# Patient Record
Sex: FEMALE | Race: WHITE | NOT HISPANIC OR LATINO | Employment: OTHER | URBAN - METROPOLITAN AREA
[De-identification: names, ages, dates, MRNs, and addresses within clinical notes are randomized per-mention and may not be internally consistent; named-entity substitution may affect disease eponyms.]

---

## 2017-01-06 ENCOUNTER — COMMUNICATION - HEALTHEAST (OUTPATIENT)
Dept: INTERNAL MEDICINE | Facility: CLINIC | Age: 82
End: 2017-01-06

## 2017-01-06 DIAGNOSIS — K21.9 ESOPHAGEAL REFLUX: ICD-10-CM

## 2017-01-06 DIAGNOSIS — F41.1 ANXIETY STATE: ICD-10-CM

## 2017-01-06 DIAGNOSIS — D62 ACUTE POSTHEMORRHAGIC ANEMIA: ICD-10-CM

## 2017-01-06 DIAGNOSIS — E11.9 DIABETES MELLITUS, TYPE 2 (H): ICD-10-CM

## 2017-03-27 ENCOUNTER — RECORDS - HEALTHEAST (OUTPATIENT)
Dept: ADMINISTRATIVE | Facility: OTHER | Age: 82
End: 2017-03-27

## 2017-04-06 ENCOUNTER — COMMUNICATION - HEALTHEAST (OUTPATIENT)
Dept: INTERNAL MEDICINE | Facility: CLINIC | Age: 82
End: 2017-04-06

## 2017-05-11 ENCOUNTER — OFFICE VISIT - HEALTHEAST (OUTPATIENT)
Dept: INTERNAL MEDICINE | Facility: CLINIC | Age: 82
End: 2017-05-11

## 2017-05-11 DIAGNOSIS — E11.9 DIABETES MELLITUS, TYPE 2 (H): ICD-10-CM

## 2017-05-11 DIAGNOSIS — I10 ESSENTIAL HYPERTENSION: ICD-10-CM

## 2017-05-11 DIAGNOSIS — E11.9 TYPE 2 DIABETES MELLITUS WITHOUT COMPLICATION (H): ICD-10-CM

## 2017-05-11 DIAGNOSIS — M54.50 LUMBAGO: ICD-10-CM

## 2017-05-11 DIAGNOSIS — E78.2 MIXED HYPERLIPIDEMIA: ICD-10-CM

## 2017-05-11 LAB
CHOLEST SERPL-MCNC: 183 MG/DL
FASTING STATUS PATIENT QL REPORTED: YES
HBA1C MFR BLD: 7.4 % (ref 3.5–6)
HDLC SERPL-MCNC: 53 MG/DL
LDLC SERPL CALC-MCNC: 106 MG/DL
TRIGL SERPL-MCNC: 119 MG/DL

## 2017-05-14 ENCOUNTER — AMBULATORY - HEALTHEAST (OUTPATIENT)
Dept: INTERNAL MEDICINE | Facility: CLINIC | Age: 82
End: 2017-05-14

## 2017-05-14 DIAGNOSIS — E78.5 HYPERLIPIDEMIA: ICD-10-CM

## 2017-05-19 ENCOUNTER — OFFICE VISIT - HEALTHEAST (OUTPATIENT)
Dept: PHYSICAL THERAPY | Facility: CLINIC | Age: 82
End: 2017-05-19

## 2017-05-19 DIAGNOSIS — C50.911 MALIGNANT NEOPLASM OF RIGHT BREAST (H): ICD-10-CM

## 2017-05-19 DIAGNOSIS — E78.2 MIXED HYPERLIPIDEMIA: ICD-10-CM

## 2017-05-19 DIAGNOSIS — I10 ESSENTIAL HYPERTENSION: ICD-10-CM

## 2017-05-19 DIAGNOSIS — M54.50 RIGHT-SIDED LOW BACK PAIN WITHOUT SCIATICA, UNSPECIFIED CHRONICITY: ICD-10-CM

## 2017-05-19 DIAGNOSIS — E11.9 TYPE 2 DIABETES MELLITUS (H): ICD-10-CM

## 2017-05-19 DIAGNOSIS — Z98.1 HISTORY OF LUMBAR FUSION: ICD-10-CM

## 2017-05-19 DIAGNOSIS — M53.3 PAIN OF RIGHT SACROILIAC JOINT: ICD-10-CM

## 2017-05-19 DIAGNOSIS — M81.0 OSTEOPOROSIS: ICD-10-CM

## 2017-05-25 ENCOUNTER — OFFICE VISIT - HEALTHEAST (OUTPATIENT)
Dept: PHYSICAL THERAPY | Facility: CLINIC | Age: 82
End: 2017-05-25

## 2017-05-25 DIAGNOSIS — M81.0 OSTEOPOROSIS: ICD-10-CM

## 2017-05-25 DIAGNOSIS — E78.2 MIXED HYPERLIPIDEMIA: ICD-10-CM

## 2017-05-25 DIAGNOSIS — I10 ESSENTIAL HYPERTENSION: ICD-10-CM

## 2017-05-25 DIAGNOSIS — M54.50 RIGHT-SIDED LOW BACK PAIN WITHOUT SCIATICA, UNSPECIFIED CHRONICITY: ICD-10-CM

## 2017-05-25 DIAGNOSIS — M53.3 PAIN OF RIGHT SACROILIAC JOINT: ICD-10-CM

## 2017-05-25 DIAGNOSIS — Z98.1 HISTORY OF LUMBAR FUSION: ICD-10-CM

## 2017-05-25 DIAGNOSIS — C50.911 MALIGNANT NEOPLASM OF RIGHT BREAST (H): ICD-10-CM

## 2017-05-25 DIAGNOSIS — E11.9 TYPE 2 DIABETES MELLITUS (H): ICD-10-CM

## 2017-05-30 ENCOUNTER — OFFICE VISIT - HEALTHEAST (OUTPATIENT)
Dept: PHYSICAL THERAPY | Facility: CLINIC | Age: 82
End: 2017-05-30

## 2017-05-30 ENCOUNTER — COMMUNICATION - HEALTHEAST (OUTPATIENT)
Dept: FAMILY MEDICINE | Facility: CLINIC | Age: 82
End: 2017-05-30

## 2017-05-30 DIAGNOSIS — I10 ESSENTIAL HYPERTENSION: ICD-10-CM

## 2017-05-30 DIAGNOSIS — M53.3 PAIN OF RIGHT SACROILIAC JOINT: ICD-10-CM

## 2017-05-30 DIAGNOSIS — E78.2 MIXED HYPERLIPIDEMIA: ICD-10-CM

## 2017-05-30 DIAGNOSIS — E11.9 TYPE 2 DIABETES MELLITUS (H): ICD-10-CM

## 2017-05-30 DIAGNOSIS — M54.50 RIGHT-SIDED LOW BACK PAIN WITHOUT SCIATICA, UNSPECIFIED CHRONICITY: ICD-10-CM

## 2017-05-30 DIAGNOSIS — Z98.1 HISTORY OF LUMBAR FUSION: ICD-10-CM

## 2017-05-30 DIAGNOSIS — E78.5 HYPERLIPIDEMIA: ICD-10-CM

## 2017-05-30 DIAGNOSIS — M81.0 OSTEOPOROSIS: ICD-10-CM

## 2017-05-30 DIAGNOSIS — C50.911 MALIGNANT NEOPLASM OF RIGHT BREAST (H): ICD-10-CM

## 2017-06-02 ENCOUNTER — OFFICE VISIT - HEALTHEAST (OUTPATIENT)
Dept: PHYSICAL THERAPY | Facility: CLINIC | Age: 82
End: 2017-06-02

## 2017-06-02 DIAGNOSIS — E11.9 TYPE 2 DIABETES MELLITUS (H): ICD-10-CM

## 2017-06-02 DIAGNOSIS — I10 ESSENTIAL HYPERTENSION: ICD-10-CM

## 2017-06-02 DIAGNOSIS — Z98.1 HISTORY OF LUMBAR FUSION: ICD-10-CM

## 2017-06-02 DIAGNOSIS — C50.911 MALIGNANT NEOPLASM OF RIGHT BREAST (H): ICD-10-CM

## 2017-06-02 DIAGNOSIS — M81.0 OSTEOPOROSIS: ICD-10-CM

## 2017-06-02 DIAGNOSIS — M53.3 PAIN OF RIGHT SACROILIAC JOINT: ICD-10-CM

## 2017-06-02 DIAGNOSIS — M54.50 RIGHT-SIDED LOW BACK PAIN WITHOUT SCIATICA, UNSPECIFIED CHRONICITY: ICD-10-CM

## 2017-06-02 DIAGNOSIS — E78.2 MIXED HYPERLIPIDEMIA: ICD-10-CM

## 2017-06-06 ENCOUNTER — OFFICE VISIT - HEALTHEAST (OUTPATIENT)
Dept: PHYSICAL THERAPY | Facility: CLINIC | Age: 82
End: 2017-06-06

## 2017-06-06 DIAGNOSIS — E11.9 TYPE 2 DIABETES MELLITUS (H): ICD-10-CM

## 2017-06-06 DIAGNOSIS — M54.50 RIGHT-SIDED LOW BACK PAIN WITHOUT SCIATICA, UNSPECIFIED CHRONICITY: ICD-10-CM

## 2017-06-06 DIAGNOSIS — M53.3 PAIN OF RIGHT SACROILIAC JOINT: ICD-10-CM

## 2017-06-06 DIAGNOSIS — Z98.1 HISTORY OF LUMBAR FUSION: ICD-10-CM

## 2017-06-06 DIAGNOSIS — M81.0 OSTEOPOROSIS: ICD-10-CM

## 2017-06-06 DIAGNOSIS — Z98.1 STATUS POST LUMBAR SPINAL FUSION: ICD-10-CM

## 2017-06-06 DIAGNOSIS — C50.911 MALIGNANT NEOPLASM OF RIGHT BREAST (H): ICD-10-CM

## 2017-06-06 DIAGNOSIS — E78.2 MIXED HYPERLIPIDEMIA: ICD-10-CM

## 2017-06-06 DIAGNOSIS — I10 ESSENTIAL HYPERTENSION: ICD-10-CM

## 2017-06-09 ENCOUNTER — OFFICE VISIT - HEALTHEAST (OUTPATIENT)
Dept: PHYSICAL THERAPY | Facility: CLINIC | Age: 82
End: 2017-06-09

## 2017-06-09 DIAGNOSIS — M53.3 PAIN OF RIGHT SACROILIAC JOINT: ICD-10-CM

## 2017-06-09 DIAGNOSIS — E78.2 MIXED HYPERLIPIDEMIA: ICD-10-CM

## 2017-06-09 DIAGNOSIS — M81.0 OSTEOPOROSIS: ICD-10-CM

## 2017-06-09 DIAGNOSIS — Z98.1 HISTORY OF LUMBAR FUSION: ICD-10-CM

## 2017-06-09 DIAGNOSIS — Z98.1 STATUS POST LUMBAR SPINAL FUSION: ICD-10-CM

## 2017-06-09 DIAGNOSIS — M54.50 RIGHT-SIDED LOW BACK PAIN WITHOUT SCIATICA, UNSPECIFIED CHRONICITY: ICD-10-CM

## 2017-06-09 DIAGNOSIS — C50.911 MALIGNANT NEOPLASM OF RIGHT BREAST (H): ICD-10-CM

## 2017-06-09 DIAGNOSIS — E11.9 TYPE 2 DIABETES MELLITUS (H): ICD-10-CM

## 2017-06-09 DIAGNOSIS — I10 ESSENTIAL HYPERTENSION: ICD-10-CM

## 2017-06-13 ENCOUNTER — OFFICE VISIT - HEALTHEAST (OUTPATIENT)
Dept: PHYSICAL THERAPY | Facility: CLINIC | Age: 82
End: 2017-06-13

## 2017-06-13 DIAGNOSIS — M53.3 PAIN OF RIGHT SACROILIAC JOINT: ICD-10-CM

## 2017-06-13 DIAGNOSIS — M54.50 RIGHT-SIDED LOW BACK PAIN WITHOUT SCIATICA, UNSPECIFIED CHRONICITY: ICD-10-CM

## 2017-06-13 DIAGNOSIS — C50.911 MALIGNANT NEOPLASM OF RIGHT BREAST (H): ICD-10-CM

## 2017-06-13 DIAGNOSIS — E11.9 TYPE 2 DIABETES MELLITUS (H): ICD-10-CM

## 2017-06-13 DIAGNOSIS — Z98.1 STATUS POST LUMBAR SPINAL FUSION: ICD-10-CM

## 2017-06-13 DIAGNOSIS — M81.0 OSTEOPOROSIS: ICD-10-CM

## 2017-06-13 DIAGNOSIS — Z98.1 HISTORY OF LUMBAR FUSION: ICD-10-CM

## 2017-06-13 DIAGNOSIS — E78.2 MIXED HYPERLIPIDEMIA: ICD-10-CM

## 2017-06-13 DIAGNOSIS — I10 ESSENTIAL HYPERTENSION: ICD-10-CM

## 2017-06-16 ENCOUNTER — OFFICE VISIT - HEALTHEAST (OUTPATIENT)
Dept: PHYSICAL THERAPY | Facility: CLINIC | Age: 82
End: 2017-06-16

## 2017-06-16 DIAGNOSIS — I10 ESSENTIAL HYPERTENSION: ICD-10-CM

## 2017-06-16 DIAGNOSIS — M53.3 PAIN OF RIGHT SACROILIAC JOINT: ICD-10-CM

## 2017-06-16 DIAGNOSIS — M54.50 RIGHT-SIDED LOW BACK PAIN WITHOUT SCIATICA, UNSPECIFIED CHRONICITY: ICD-10-CM

## 2017-06-16 DIAGNOSIS — Z98.1 HISTORY OF LUMBAR FUSION: ICD-10-CM

## 2017-06-16 DIAGNOSIS — E78.2 MIXED HYPERLIPIDEMIA: ICD-10-CM

## 2017-06-16 DIAGNOSIS — E11.9 TYPE 2 DIABETES MELLITUS (H): ICD-10-CM

## 2017-06-16 DIAGNOSIS — M81.0 OSTEOPOROSIS: ICD-10-CM

## 2017-06-16 DIAGNOSIS — C50.911 MALIGNANT NEOPLASM OF RIGHT BREAST (H): ICD-10-CM

## 2017-06-20 ENCOUNTER — OFFICE VISIT - HEALTHEAST (OUTPATIENT)
Dept: PHYSICAL THERAPY | Facility: CLINIC | Age: 82
End: 2017-06-20

## 2017-06-20 DIAGNOSIS — I10 ESSENTIAL HYPERTENSION: ICD-10-CM

## 2017-06-20 DIAGNOSIS — M53.3 PAIN OF RIGHT SACROILIAC JOINT: ICD-10-CM

## 2017-06-20 DIAGNOSIS — E11.9 TYPE 2 DIABETES MELLITUS (H): ICD-10-CM

## 2017-06-20 DIAGNOSIS — E78.2 MIXED HYPERLIPIDEMIA: ICD-10-CM

## 2017-06-20 DIAGNOSIS — M54.50 RIGHT-SIDED LOW BACK PAIN WITHOUT SCIATICA, UNSPECIFIED CHRONICITY: ICD-10-CM

## 2017-06-20 DIAGNOSIS — Z98.1 HISTORY OF LUMBAR FUSION: ICD-10-CM

## 2017-06-20 DIAGNOSIS — M81.0 OSTEOPOROSIS: ICD-10-CM

## 2017-06-20 DIAGNOSIS — C50.911 MALIGNANT NEOPLASM OF RIGHT BREAST (H): ICD-10-CM

## 2017-06-23 ENCOUNTER — OFFICE VISIT - HEALTHEAST (OUTPATIENT)
Dept: PHYSICAL THERAPY | Facility: CLINIC | Age: 82
End: 2017-06-23

## 2017-06-23 DIAGNOSIS — C50.911 MALIGNANT NEOPLASM OF RIGHT BREAST (H): ICD-10-CM

## 2017-06-23 DIAGNOSIS — E78.2 MIXED HYPERLIPIDEMIA: ICD-10-CM

## 2017-06-23 DIAGNOSIS — Z98.1 HISTORY OF LUMBAR FUSION: ICD-10-CM

## 2017-06-23 DIAGNOSIS — E11.9 TYPE 2 DIABETES MELLITUS (H): ICD-10-CM

## 2017-06-23 DIAGNOSIS — M53.3 PAIN OF RIGHT SACROILIAC JOINT: ICD-10-CM

## 2017-06-23 DIAGNOSIS — M54.50 RIGHT-SIDED LOW BACK PAIN WITHOUT SCIATICA, UNSPECIFIED CHRONICITY: ICD-10-CM

## 2017-06-23 DIAGNOSIS — I10 ESSENTIAL HYPERTENSION: ICD-10-CM

## 2017-06-23 DIAGNOSIS — M81.0 OSTEOPOROSIS: ICD-10-CM

## 2017-06-27 ENCOUNTER — OFFICE VISIT - HEALTHEAST (OUTPATIENT)
Dept: PHYSICAL THERAPY | Facility: CLINIC | Age: 82
End: 2017-06-27

## 2017-06-27 DIAGNOSIS — M54.50 RIGHT-SIDED LOW BACK PAIN WITHOUT SCIATICA, UNSPECIFIED CHRONICITY: ICD-10-CM

## 2017-06-27 DIAGNOSIS — E78.2 MIXED HYPERLIPIDEMIA: ICD-10-CM

## 2017-06-27 DIAGNOSIS — Z98.1 HISTORY OF LUMBAR FUSION: ICD-10-CM

## 2017-06-27 DIAGNOSIS — E11.9 TYPE 2 DIABETES MELLITUS (H): ICD-10-CM

## 2017-06-27 DIAGNOSIS — M81.0 OSTEOPOROSIS: ICD-10-CM

## 2017-06-27 DIAGNOSIS — C50.911 MALIGNANT NEOPLASM OF RIGHT BREAST (H): ICD-10-CM

## 2017-06-27 DIAGNOSIS — I10 ESSENTIAL HYPERTENSION: ICD-10-CM

## 2017-06-27 DIAGNOSIS — M53.3 PAIN OF RIGHT SACROILIAC JOINT: ICD-10-CM

## 2017-06-28 ENCOUNTER — COMMUNICATION - HEALTHEAST (OUTPATIENT)
Dept: INTERNAL MEDICINE | Facility: CLINIC | Age: 82
End: 2017-06-28

## 2017-06-30 ENCOUNTER — OFFICE VISIT - HEALTHEAST (OUTPATIENT)
Dept: PHYSICAL THERAPY | Facility: CLINIC | Age: 82
End: 2017-06-30

## 2017-06-30 DIAGNOSIS — Z98.1 HISTORY OF LUMBAR FUSION: ICD-10-CM

## 2017-06-30 DIAGNOSIS — M81.0 OSTEOPOROSIS: ICD-10-CM

## 2017-06-30 DIAGNOSIS — E78.2 MIXED HYPERLIPIDEMIA: ICD-10-CM

## 2017-06-30 DIAGNOSIS — M53.3 PAIN OF RIGHT SACROILIAC JOINT: ICD-10-CM

## 2017-06-30 DIAGNOSIS — C50.911 MALIGNANT NEOPLASM OF RIGHT BREAST (H): ICD-10-CM

## 2017-06-30 DIAGNOSIS — E11.9 TYPE 2 DIABETES MELLITUS (H): ICD-10-CM

## 2017-06-30 DIAGNOSIS — I10 ESSENTIAL HYPERTENSION: ICD-10-CM

## 2017-06-30 DIAGNOSIS — M54.50 RIGHT-SIDED LOW BACK PAIN WITHOUT SCIATICA, UNSPECIFIED CHRONICITY: ICD-10-CM

## 2017-07-03 ENCOUNTER — RECORDS - HEALTHEAST (OUTPATIENT)
Dept: ADMINISTRATIVE | Facility: OTHER | Age: 82
End: 2017-07-03

## 2017-07-11 ENCOUNTER — RECORDS - HEALTHEAST (OUTPATIENT)
Dept: ADMINISTRATIVE | Facility: OTHER | Age: 82
End: 2017-07-11

## 2017-07-21 ENCOUNTER — OFFICE VISIT - HEALTHEAST (OUTPATIENT)
Dept: PHYSICAL THERAPY | Facility: REHABILITATION | Age: 82
End: 2017-07-21

## 2017-07-21 DIAGNOSIS — M53.3 PAIN OF RIGHT SACROILIAC JOINT: ICD-10-CM

## 2017-07-21 DIAGNOSIS — E11.9 TYPE 2 DIABETES MELLITUS (H): ICD-10-CM

## 2017-07-21 DIAGNOSIS — E78.2 MIXED HYPERLIPIDEMIA: ICD-10-CM

## 2017-07-21 DIAGNOSIS — I10 ESSENTIAL HYPERTENSION: ICD-10-CM

## 2017-07-21 DIAGNOSIS — Z98.1 HISTORY OF LUMBAR FUSION: ICD-10-CM

## 2017-07-21 DIAGNOSIS — M54.50 RIGHT-SIDED LOW BACK PAIN WITHOUT SCIATICA, UNSPECIFIED CHRONICITY: ICD-10-CM

## 2017-07-21 DIAGNOSIS — C50.911 MALIGNANT NEOPLASM OF RIGHT BREAST (H): ICD-10-CM

## 2017-07-21 DIAGNOSIS — M81.0 OSTEOPOROSIS: ICD-10-CM

## 2017-07-24 ENCOUNTER — COMMUNICATION - HEALTHEAST (OUTPATIENT)
Dept: INTERNAL MEDICINE | Facility: CLINIC | Age: 82
End: 2017-07-24

## 2017-07-24 DIAGNOSIS — M81.0 OSTEOPOROSIS: ICD-10-CM

## 2017-08-09 ENCOUNTER — COMMUNICATION - HEALTHEAST (OUTPATIENT)
Dept: FAMILY MEDICINE | Facility: CLINIC | Age: 82
End: 2017-08-09

## 2017-08-09 DIAGNOSIS — I10 ESSENTIAL HYPERTENSION, MALIGNANT: ICD-10-CM

## 2017-08-22 ENCOUNTER — COMMUNICATION - HEALTHEAST (OUTPATIENT)
Dept: INTERNAL MEDICINE | Facility: CLINIC | Age: 82
End: 2017-08-22

## 2017-08-22 DIAGNOSIS — E11.9 DIABETES MELLITUS, TYPE 2 (H): ICD-10-CM

## 2017-09-16 ENCOUNTER — COMMUNICATION - HEALTHEAST (OUTPATIENT)
Dept: INTERNAL MEDICINE | Facility: CLINIC | Age: 82
End: 2017-09-16

## 2017-09-16 DIAGNOSIS — K21.9 ESOPHAGEAL REFLUX: ICD-10-CM

## 2017-09-16 DIAGNOSIS — F41.1 ANXIETY STATE: ICD-10-CM

## 2017-10-03 ENCOUNTER — OFFICE VISIT - HEALTHEAST (OUTPATIENT)
Dept: INTERNAL MEDICINE | Facility: CLINIC | Age: 82
End: 2017-10-03

## 2017-10-03 DIAGNOSIS — Z12.31 SCREENING MAMMOGRAM, ENCOUNTER FOR: ICD-10-CM

## 2017-10-03 DIAGNOSIS — E78.2 MIXED HYPERLIPIDEMIA: ICD-10-CM

## 2017-10-03 DIAGNOSIS — M81.0 OSTEOPOROSIS: ICD-10-CM

## 2017-10-03 DIAGNOSIS — E11.9 DIABETES MELLITUS, TYPE 2 (H): ICD-10-CM

## 2017-10-03 DIAGNOSIS — Z00.00 HEALTH CARE MAINTENANCE: ICD-10-CM

## 2017-10-03 DIAGNOSIS — M79.672 MECHANICAL PAIN OF LEFT FOOT: ICD-10-CM

## 2017-10-03 DIAGNOSIS — I10 ESSENTIAL HYPERTENSION: ICD-10-CM

## 2017-10-03 DIAGNOSIS — E11.9 TYPE 2 DIABETES MELLITUS (H): ICD-10-CM

## 2017-10-03 LAB
CHOLEST SERPL-MCNC: 189 MG/DL
FASTING STATUS PATIENT QL REPORTED: NORMAL
HBA1C MFR BLD: 7.8 % (ref 3.5–6)
HDLC SERPL-MCNC: 59 MG/DL
LDLC SERPL CALC-MCNC: 105 MG/DL
TRIGL SERPL-MCNC: 127 MG/DL

## 2017-10-03 ASSESSMENT — MIFFLIN-ST. JEOR: SCORE: 997.93

## 2017-10-10 ENCOUNTER — AMBULATORY - HEALTHEAST (OUTPATIENT)
Dept: INTERNAL MEDICINE | Facility: CLINIC | Age: 82
End: 2017-10-10

## 2017-10-10 DIAGNOSIS — E78.5 HYPERLIPIDEMIA: ICD-10-CM

## 2017-10-17 ENCOUNTER — OFFICE VISIT - HEALTHEAST (OUTPATIENT)
Dept: INTERNAL MEDICINE | Facility: CLINIC | Age: 82
End: 2017-10-17

## 2017-10-17 DIAGNOSIS — J32.9 SINUSITIS: ICD-10-CM

## 2017-10-18 ENCOUNTER — AMBULATORY - HEALTHEAST (OUTPATIENT)
Dept: ENDOCRINOLOGY | Facility: CLINIC | Age: 82
End: 2017-10-18

## 2017-10-18 ENCOUNTER — COMMUNICATION - HEALTHEAST (OUTPATIENT)
Dept: ENDOCRINOLOGY | Facility: CLINIC | Age: 82
End: 2017-10-18

## 2017-10-18 DIAGNOSIS — M81.0 OSTEOPOROSIS: ICD-10-CM

## 2017-10-23 ENCOUNTER — AMBULATORY - HEALTHEAST (OUTPATIENT)
Dept: LAB | Facility: CLINIC | Age: 82
End: 2017-10-23

## 2017-10-23 DIAGNOSIS — M81.0 OSTEOPOROSIS: ICD-10-CM

## 2017-10-26 ENCOUNTER — HOSPITAL ENCOUNTER (OUTPATIENT)
Dept: MAMMOGRAPHY | Facility: CLINIC | Age: 82
Discharge: HOME | End: 2017-10-26
Attending: INTERNAL MEDICINE

## 2017-10-26 DIAGNOSIS — Z12.31 SCREENING MAMMOGRAM, ENCOUNTER FOR: ICD-10-CM

## 2017-10-27 ENCOUNTER — OFFICE VISIT - HEALTHEAST (OUTPATIENT)
Dept: ENDOCRINOLOGY | Facility: CLINIC | Age: 82
End: 2017-10-27

## 2017-10-27 ENCOUNTER — AMBULATORY - HEALTHEAST (OUTPATIENT)
Dept: ENDOCRINOLOGY | Facility: CLINIC | Age: 82
End: 2017-10-27

## 2017-10-27 DIAGNOSIS — M81.0 OSTEOPOROSIS: ICD-10-CM

## 2017-10-27 ASSESSMENT — MIFFLIN-ST. JEOR: SCORE: 996.34

## 2017-11-01 ENCOUNTER — RECORDS - HEALTHEAST (OUTPATIENT)
Dept: ADMINISTRATIVE | Facility: OTHER | Age: 82
End: 2017-11-01

## 2017-11-01 ENCOUNTER — OFFICE VISIT - HEALTHEAST (OUTPATIENT)
Dept: PODIATRY | Facility: CLINIC | Age: 82
End: 2017-11-01

## 2017-11-01 DIAGNOSIS — M20.10 HALLUX VALGUS, UNSPECIFIED LATERALITY: ICD-10-CM

## 2017-11-01 DIAGNOSIS — G57.82 NEUROMA, DIGITAL, LEFT: ICD-10-CM

## 2017-11-06 ENCOUNTER — RECORDS - HEALTHEAST (OUTPATIENT)
Dept: ADMINISTRATIVE | Facility: OTHER | Age: 82
End: 2017-11-06

## 2017-11-29 ENCOUNTER — RECORDS - HEALTHEAST (OUTPATIENT)
Dept: ADMINISTRATIVE | Facility: OTHER | Age: 82
End: 2017-11-29

## 2017-11-30 ENCOUNTER — OFFICE VISIT - HEALTHEAST (OUTPATIENT)
Dept: INTERNAL MEDICINE | Facility: CLINIC | Age: 82
End: 2017-11-30

## 2017-11-30 DIAGNOSIS — I10 ESSENTIAL HYPERTENSION: ICD-10-CM

## 2017-11-30 DIAGNOSIS — M20.12 HALLUX ABDUCTO VALGUS, LEFT: ICD-10-CM

## 2017-11-30 DIAGNOSIS — M81.0 OSTEOPOROSIS: ICD-10-CM

## 2017-11-30 DIAGNOSIS — I10 HYPERTENSION: ICD-10-CM

## 2017-11-30 DIAGNOSIS — E11.9 TYPE 2 DIABETES MELLITUS (H): ICD-10-CM

## 2017-11-30 DIAGNOSIS — E78.2 MIXED HYPERLIPIDEMIA: ICD-10-CM

## 2017-11-30 DIAGNOSIS — Z01.810 PREOP CARDIOVASCULAR EXAM: ICD-10-CM

## 2017-11-30 ASSESSMENT — MIFFLIN-ST. JEOR: SCORE: 1003.03

## 2017-12-01 ENCOUNTER — SURGERY - HEALTHEAST (OUTPATIENT)
Dept: SURGERY | Facility: CLINIC | Age: 82
End: 2017-12-01

## 2017-12-01 ENCOUNTER — ANESTHESIA - HEALTHEAST (OUTPATIENT)
Dept: SURGERY | Facility: CLINIC | Age: 82
End: 2017-12-01

## 2017-12-01 LAB
ATRIAL RATE - MUSE: 82 BPM
DIASTOLIC BLOOD PRESSURE - MUSE: NORMAL MMHG
INTERPRETATION ECG - MUSE: NORMAL
P AXIS - MUSE: 43 DEGREES
PR INTERVAL - MUSE: 170 MS
QRS DURATION - MUSE: 108 MS
QT - MUSE: 380 MS
QTC - MUSE: 443 MS
R AXIS - MUSE: -55 DEGREES
SYSTOLIC BLOOD PRESSURE - MUSE: NORMAL MMHG
T AXIS - MUSE: 11 DEGREES
VENTRICULAR RATE- MUSE: 82 BPM

## 2017-12-01 ASSESSMENT — MIFFLIN-ST. JEOR: SCORE: 977.81

## 2017-12-06 ENCOUNTER — OFFICE VISIT - HEALTHEAST (OUTPATIENT)
Dept: PODIATRY | Facility: CLINIC | Age: 82
End: 2017-12-06

## 2017-12-06 DIAGNOSIS — M20.10 HALLUX VALGUS, UNSPECIFIED LATERALITY: ICD-10-CM

## 2017-12-12 ENCOUNTER — RECORDS - HEALTHEAST (OUTPATIENT)
Dept: ADMINISTRATIVE | Facility: OTHER | Age: 82
End: 2017-12-12

## 2017-12-12 ENCOUNTER — RECORDS - HEALTHEAST (OUTPATIENT)
Dept: BONE DENSITY | Facility: CLINIC | Age: 82
End: 2017-12-12

## 2017-12-12 DIAGNOSIS — M81.0 AGE-RELATED OSTEOPOROSIS WITHOUT CURRENT PATHOLOGICAL FRACTURE: ICD-10-CM

## 2017-12-13 ENCOUNTER — RECORDS - HEALTHEAST (OUTPATIENT)
Dept: GENERAL RADIOLOGY | Facility: CLINIC | Age: 82
End: 2017-12-13

## 2017-12-13 ENCOUNTER — OFFICE VISIT - HEALTHEAST (OUTPATIENT)
Dept: PODIATRY | Facility: CLINIC | Age: 82
End: 2017-12-13

## 2017-12-13 DIAGNOSIS — M20.10 HALLUX VALGUS (ACQUIRED), UNSPECIFIED FOOT: ICD-10-CM

## 2017-12-13 DIAGNOSIS — M20.10 HALLUX VALGUS, UNSPECIFIED LATERALITY: ICD-10-CM

## 2017-12-13 ASSESSMENT — MIFFLIN-ST. JEOR: SCORE: 977.81

## 2017-12-19 ENCOUNTER — COMMUNICATION - HEALTHEAST (OUTPATIENT)
Dept: ENDOCRINOLOGY | Facility: CLINIC | Age: 82
End: 2017-12-19

## 2017-12-19 ENCOUNTER — COMMUNICATION - HEALTHEAST (OUTPATIENT)
Dept: INTERNAL MEDICINE | Facility: CLINIC | Age: 82
End: 2017-12-19

## 2017-12-19 DIAGNOSIS — F41.1 ANXIETY STATE: ICD-10-CM

## 2017-12-19 DIAGNOSIS — K21.9 ESOPHAGEAL REFLUX: ICD-10-CM

## 2017-12-27 ENCOUNTER — OFFICE VISIT - HEALTHEAST (OUTPATIENT)
Dept: PODIATRY | Age: 82
End: 2017-12-27

## 2017-12-27 DIAGNOSIS — G57.62 LESION OF PLANTAR NERVE, LEFT LOWER LIMB: ICD-10-CM

## 2017-12-27 DIAGNOSIS — G57.82 NEUROMA, DIGITAL, LEFT: ICD-10-CM

## 2017-12-27 DIAGNOSIS — M20.10 HALLUX VALGUS, UNSPECIFIED LATERALITY: ICD-10-CM

## 2018-01-01 ENCOUNTER — OFFICE VISIT - HEALTHEAST (OUTPATIENT)
Dept: FAMILY MEDICINE | Facility: CLINIC | Age: 83
End: 2018-01-01

## 2018-01-01 DIAGNOSIS — J32.9 SINUSITIS: ICD-10-CM

## 2018-01-07 ENCOUNTER — COMMUNICATION - HEALTHEAST (OUTPATIENT)
Dept: INTERNAL MEDICINE | Facility: CLINIC | Age: 83
End: 2018-01-07

## 2018-01-07 DIAGNOSIS — D62 ACUTE POSTHEMORRHAGIC ANEMIA: ICD-10-CM

## 2018-02-26 ENCOUNTER — OFFICE VISIT - HEALTHEAST (OUTPATIENT)
Dept: INTERNAL MEDICINE | Facility: CLINIC | Age: 83
End: 2018-02-26

## 2018-02-26 DIAGNOSIS — E11.9 TYPE 2 DIABETES MELLITUS (H): ICD-10-CM

## 2018-02-26 DIAGNOSIS — E78.2 MIXED HYPERLIPIDEMIA: ICD-10-CM

## 2018-02-26 DIAGNOSIS — R42 DIZZINESS: ICD-10-CM

## 2018-03-09 ENCOUNTER — RECORDS - HEALTHEAST (OUTPATIENT)
Dept: ADMINISTRATIVE | Facility: OTHER | Age: 83
End: 2018-03-09

## 2018-03-11 ENCOUNTER — COMMUNICATION - HEALTHEAST (OUTPATIENT)
Dept: INTERNAL MEDICINE | Facility: CLINIC | Age: 83
End: 2018-03-11

## 2018-03-11 DIAGNOSIS — E11.9 TYPE 2 DIABETES MELLITUS WITHOUT COMPLICATION (H): ICD-10-CM

## 2018-03-12 ENCOUNTER — AMBULATORY - HEALTHEAST (OUTPATIENT)
Dept: LAB | Facility: CLINIC | Age: 83
End: 2018-03-12

## 2018-03-12 DIAGNOSIS — E11.9 TYPE 2 DIABETES MELLITUS (H): ICD-10-CM

## 2018-03-12 DIAGNOSIS — E78.2 MIXED HYPERLIPIDEMIA: ICD-10-CM

## 2018-03-12 LAB
CHOLEST SERPL-MCNC: 122 MG/DL
CREAT UR-MCNC: 190.1 MG/DL
FASTING STATUS PATIENT QL REPORTED: YES
HBA1C MFR BLD: 6.9 % (ref 3.5–6)
HDLC SERPL-MCNC: 46 MG/DL
LDLC SERPL CALC-MCNC: 64 MG/DL
MICROALBUMIN UR-MCNC: 6.2 MG/DL (ref 0–1.99)
MICROALBUMIN/CREAT UR: 32.6 MG/G
TRIGL SERPL-MCNC: 58 MG/DL

## 2018-03-13 ENCOUNTER — RECORDS - HEALTHEAST (OUTPATIENT)
Dept: ADMINISTRATIVE | Facility: OTHER | Age: 83
End: 2018-03-13

## 2018-03-14 ENCOUNTER — COMMUNICATION - HEALTHEAST (OUTPATIENT)
Dept: INTERNAL MEDICINE | Facility: CLINIC | Age: 83
End: 2018-03-14

## 2018-03-15 ENCOUNTER — COMMUNICATION - HEALTHEAST (OUTPATIENT)
Dept: INTERNAL MEDICINE | Facility: CLINIC | Age: 83
End: 2018-03-15

## 2018-03-15 ENCOUNTER — OFFICE VISIT - HEALTHEAST (OUTPATIENT)
Dept: INTERNAL MEDICINE | Facility: CLINIC | Age: 83
End: 2018-03-15

## 2018-03-15 DIAGNOSIS — E11.9 TYPE 2 DIABETES MELLITUS WITHOUT COMPLICATION (H): ICD-10-CM

## 2018-03-15 DIAGNOSIS — J32.9 SINUSITIS: ICD-10-CM

## 2018-03-15 DIAGNOSIS — D64.9 ANEMIA: ICD-10-CM

## 2018-03-15 DIAGNOSIS — I10 ESSENTIAL HYPERTENSION: ICD-10-CM

## 2018-03-15 DIAGNOSIS — E78.2 MIXED HYPERLIPIDEMIA: ICD-10-CM

## 2018-03-15 LAB
ERYTHROCYTE [DISTWIDTH] IN BLOOD BY AUTOMATED COUNT: 12.3 % (ref 11–14.5)
FERRITIN SERPL-MCNC: 88 NG/ML (ref 10–130)
HCT VFR BLD AUTO: 30.3 % (ref 35–47)
HGB BLD-MCNC: 10.3 G/DL (ref 12–16)
IRON SATN MFR SERPL: 33 % (ref 20–50)
IRON SERPL-MCNC: 95 UG/DL (ref 42–175)
MCH RBC QN AUTO: 30.8 PG (ref 27–34)
MCHC RBC AUTO-ENTMCNC: 33.8 G/DL (ref 32–36)
MCV RBC AUTO: 91 FL (ref 80–100)
PLATELET # BLD AUTO: 262 THOU/UL (ref 140–440)
PMV BLD AUTO: 6.9 FL (ref 7–10)
RBC # BLD AUTO: 3.33 MILL/UL (ref 3.8–5.4)
TIBC SERPL-MCNC: 288 UG/DL (ref 313–563)
TRANSFERRIN SERPL-MCNC: 231 MG/DL (ref 212–360)
VIT B12 SERPL-MCNC: 490 PG/ML (ref 213–816)
WBC: 3.8 THOU/UL (ref 4–11)

## 2018-03-16 ENCOUNTER — COMMUNICATION - HEALTHEAST (OUTPATIENT)
Dept: INTERNAL MEDICINE | Facility: CLINIC | Age: 83
End: 2018-03-16

## 2018-04-11 ENCOUNTER — RECORDS - HEALTHEAST (OUTPATIENT)
Dept: ADMINISTRATIVE | Facility: OTHER | Age: 83
End: 2018-04-11

## 2018-04-28 ENCOUNTER — COMMUNICATION - HEALTHEAST (OUTPATIENT)
Dept: INTERNAL MEDICINE | Facility: CLINIC | Age: 83
End: 2018-04-28

## 2018-04-28 DIAGNOSIS — E11.9 DIABETES MELLITUS, TYPE 2 (H): ICD-10-CM

## 2018-05-02 ENCOUNTER — COMMUNICATION - HEALTHEAST (OUTPATIENT)
Dept: FAMILY MEDICINE | Facility: CLINIC | Age: 83
End: 2018-05-02

## 2018-05-02 DIAGNOSIS — I10 ESSENTIAL HYPERTENSION, MALIGNANT: ICD-10-CM

## 2018-07-28 ENCOUNTER — COMMUNICATION - HEALTHEAST (OUTPATIENT)
Dept: FAMILY MEDICINE | Facility: CLINIC | Age: 83
End: 2018-07-28

## 2018-07-28 DIAGNOSIS — I10 ESSENTIAL HYPERTENSION, MALIGNANT: ICD-10-CM

## 2018-08-07 ENCOUNTER — OFFICE VISIT - HEALTHEAST (OUTPATIENT)
Dept: INTERNAL MEDICINE | Facility: CLINIC | Age: 83
End: 2018-08-07

## 2018-08-07 DIAGNOSIS — R19.7 DIARRHEA: ICD-10-CM

## 2018-08-15 ENCOUNTER — OFFICE VISIT - HEALTHEAST (OUTPATIENT)
Dept: INTERNAL MEDICINE | Facility: CLINIC | Age: 83
End: 2018-08-15

## 2018-08-15 DIAGNOSIS — E11.9 TYPE 2 DIABETES MELLITUS (H): ICD-10-CM

## 2018-08-15 DIAGNOSIS — I10 ESSENTIAL HYPERTENSION: ICD-10-CM

## 2018-08-15 DIAGNOSIS — R19.7 DIARRHEA, UNSPECIFIED TYPE: ICD-10-CM

## 2018-08-15 DIAGNOSIS — E78.2 MIXED HYPERLIPIDEMIA: ICD-10-CM

## 2018-08-15 LAB
ALBUMIN SERPL-MCNC: 3.8 G/DL (ref 3.5–5)
ALP SERPL-CCNC: 86 U/L (ref 45–120)
ALT SERPL W P-5'-P-CCNC: 23 U/L (ref 0–45)
ANION GAP SERPL CALCULATED.3IONS-SCNC: 12 MMOL/L (ref 5–18)
AST SERPL W P-5'-P-CCNC: 16 U/L (ref 0–40)
BILIRUB SERPL-MCNC: 0.6 MG/DL (ref 0–1)
BUN SERPL-MCNC: 21 MG/DL (ref 8–28)
CALCIUM SERPL-MCNC: 10.1 MG/DL (ref 8.5–10.5)
CHLORIDE BLD-SCNC: 104 MMOL/L (ref 98–107)
CHOLEST SERPL-MCNC: 177 MG/DL
CO2 SERPL-SCNC: 25 MMOL/L (ref 22–31)
CREAT SERPL-MCNC: 1.07 MG/DL (ref 0.6–1.1)
FASTING STATUS PATIENT QL REPORTED: YES
GFR SERPL CREATININE-BSD FRML MDRD: 49 ML/MIN/1.73M2
GLUCOSE BLD-MCNC: 125 MG/DL (ref 70–125)
HBA1C MFR BLD: 7.4 % (ref 3.5–6)
HDLC SERPL-MCNC: 50 MG/DL
LDLC SERPL CALC-MCNC: 103 MG/DL
POTASSIUM BLD-SCNC: 4.7 MMOL/L (ref 3.5–5)
PROT SERPL-MCNC: 7.2 G/DL (ref 6–8)
SODIUM SERPL-SCNC: 141 MMOL/L (ref 136–145)
TRIGL SERPL-MCNC: 121 MG/DL

## 2018-08-15 RX ORDER — NYSTATIN 100000 U/G
CREAM TOPICAL
Qty: 30 G | Refills: 0 | Status: SHIPPED | OUTPATIENT
Start: 2018-08-15

## 2018-09-06 ENCOUNTER — AMBULATORY - HEALTHEAST (OUTPATIENT)
Dept: ENDOCRINOLOGY | Facility: CLINIC | Age: 83
End: 2018-09-06

## 2018-09-06 DIAGNOSIS — M81.0 OSTEOPOROSIS: ICD-10-CM

## 2018-09-06 DIAGNOSIS — E11.9 TYPE 2 DIABETES MELLITUS (H): ICD-10-CM

## 2018-10-04 ENCOUNTER — COMMUNICATION - HEALTHEAST (OUTPATIENT)
Dept: INTERNAL MEDICINE | Facility: CLINIC | Age: 83
End: 2018-10-04

## 2018-10-04 DIAGNOSIS — E78.5 HYPERLIPIDEMIA: ICD-10-CM

## 2018-10-19 ENCOUNTER — AMBULATORY - HEALTHEAST (OUTPATIENT)
Dept: LAB | Facility: CLINIC | Age: 83
End: 2018-10-19

## 2018-10-19 DIAGNOSIS — M81.0 OSTEOPOROSIS: ICD-10-CM

## 2018-10-19 LAB — CALCIUM SERPL-MCNC: 10 MG/DL (ref 8.5–10.5)

## 2018-10-22 LAB
25(OH)D3 SERPL-MCNC: 66.9 NG/ML (ref 30–80)
25(OH)D3 SERPL-MCNC: 66.9 NG/ML (ref 30–80)

## 2018-10-23 ENCOUNTER — RECORDS - HEALTHEAST (OUTPATIENT)
Dept: ADMINISTRATIVE | Facility: OTHER | Age: 83
End: 2018-10-23

## 2018-10-28 ENCOUNTER — COMMUNICATION - HEALTHEAST (OUTPATIENT)
Dept: FAMILY MEDICINE | Facility: CLINIC | Age: 83
End: 2018-10-28

## 2018-10-28 DIAGNOSIS — I10 ESSENTIAL HYPERTENSION, MALIGNANT: ICD-10-CM

## 2018-11-01 ENCOUNTER — COMMUNICATION - HEALTHEAST (OUTPATIENT)
Dept: INTERNAL MEDICINE | Facility: CLINIC | Age: 83
End: 2018-11-01

## 2018-11-01 DIAGNOSIS — M81.0 OSTEOPOROSIS: ICD-10-CM

## 2018-11-06 ENCOUNTER — HOSPITAL ENCOUNTER (OUTPATIENT)
Dept: MAMMOGRAPHY | Facility: CLINIC | Age: 83
Discharge: HOME | End: 2018-11-06
Attending: INTERNAL MEDICINE

## 2018-11-06 DIAGNOSIS — Z12.31 VISIT FOR SCREENING MAMMOGRAM: ICD-10-CM

## 2018-12-05 ENCOUNTER — RECORDS - HEALTHEAST (OUTPATIENT)
Dept: ADMINISTRATIVE | Facility: OTHER | Age: 83
End: 2018-12-05

## 2018-12-07 ENCOUNTER — OFFICE VISIT - HEALTHEAST (OUTPATIENT)
Dept: ENDOCRINOLOGY | Facility: CLINIC | Age: 83
End: 2018-12-07

## 2018-12-07 DIAGNOSIS — M81.0 OSTEOPOROSIS WITHOUT CURRENT PATHOLOGICAL FRACTURE, UNSPECIFIED OSTEOPOROSIS TYPE: ICD-10-CM

## 2018-12-07 ASSESSMENT — MIFFLIN-ST. JEOR: SCORE: 986.36

## 2018-12-17 ENCOUNTER — COMMUNICATION - HEALTHEAST (OUTPATIENT)
Dept: INTERNAL MEDICINE | Facility: CLINIC | Age: 83
End: 2018-12-17

## 2018-12-17 DIAGNOSIS — F41.1 ANXIETY STATE: ICD-10-CM

## 2019-01-09 ENCOUNTER — COMMUNICATION - HEALTHEAST (OUTPATIENT)
Dept: INTERNAL MEDICINE | Facility: CLINIC | Age: 84
End: 2019-01-09

## 2019-01-09 DIAGNOSIS — E11.9 TYPE 2 DIABETES MELLITUS (H): ICD-10-CM

## 2019-01-11 ENCOUNTER — COMMUNICATION - HEALTHEAST (OUTPATIENT)
Dept: SCHEDULING | Facility: CLINIC | Age: 84
End: 2019-01-11

## 2019-01-11 ENCOUNTER — COMMUNICATION - HEALTHEAST (OUTPATIENT)
Dept: INTERNAL MEDICINE | Facility: CLINIC | Age: 84
End: 2019-01-11

## 2019-01-14 ENCOUNTER — OFFICE VISIT - HEALTHEAST (OUTPATIENT)
Dept: INTERNAL MEDICINE | Facility: CLINIC | Age: 84
End: 2019-01-14

## 2019-01-14 ENCOUNTER — COMMUNICATION - HEALTHEAST (OUTPATIENT)
Dept: INTERNAL MEDICINE | Facility: CLINIC | Age: 84
End: 2019-01-14

## 2019-01-14 ENCOUNTER — HOSPITAL ENCOUNTER (OUTPATIENT)
Dept: LAB | Age: 84
Setting detail: SPECIMEN
Discharge: HOME | End: 2019-01-14

## 2019-01-14 DIAGNOSIS — E11.9 TYPE 2 DIABETES MELLITUS (H): ICD-10-CM

## 2019-01-14 DIAGNOSIS — K21.9 ESOPHAGEAL REFLUX: ICD-10-CM

## 2019-01-14 LAB
ALBUMIN SERPL-MCNC: 3.6 G/DL (ref 3.5–5)
ALP SERPL-CCNC: 105 U/L (ref 45–120)
ALT SERPL W P-5'-P-CCNC: 31 U/L (ref 0–45)
ANION GAP SERPL CALCULATED.3IONS-SCNC: 14 MMOL/L (ref 5–18)
AST SERPL W P-5'-P-CCNC: 26 U/L (ref 0–40)
BILIRUB SERPL-MCNC: 0.4 MG/DL (ref 0–1)
BUN SERPL-MCNC: 27 MG/DL (ref 8–28)
CALCIUM SERPL-MCNC: 9.5 MG/DL (ref 8.5–10.5)
CHLORIDE BLD-SCNC: 104 MMOL/L (ref 98–107)
CO2 SERPL-SCNC: 24 MMOL/L (ref 22–31)
CREAT SERPL-MCNC: 1.1 MG/DL (ref 0.6–1.1)
GFR SERPL CREATININE-BSD FRML MDRD: 48 ML/MIN/1.73M2
GLUCOSE BLD-MCNC: 161 MG/DL (ref 70–125)
HBA1C MFR BLD: 6.8 % (ref 3.5–6)
POTASSIUM BLD-SCNC: 4.5 MMOL/L (ref 3.5–5)
PROT SERPL-MCNC: 7.2 G/DL (ref 6–8)
SODIUM SERPL-SCNC: 142 MMOL/L (ref 136–145)

## 2019-02-05 ENCOUNTER — OFFICE VISIT - HEALTHEAST (OUTPATIENT)
Dept: INTERNAL MEDICINE | Facility: CLINIC | Age: 84
End: 2019-02-05

## 2019-02-05 DIAGNOSIS — J01.00 ACUTE MAXILLARY SINUSITIS, RECURRENCE NOT SPECIFIED: ICD-10-CM

## 2019-02-11 ENCOUNTER — OFFICE VISIT - HEALTHEAST (OUTPATIENT)
Dept: FAMILY MEDICINE | Facility: CLINIC | Age: 84
End: 2019-02-11

## 2019-02-11 DIAGNOSIS — J01.40 ACUTE NON-RECURRENT PANSINUSITIS: ICD-10-CM

## 2019-02-11 DIAGNOSIS — R52 BODY ACHES: ICD-10-CM

## 2019-02-11 DIAGNOSIS — R68.83 CHILLS: ICD-10-CM

## 2019-02-11 LAB
FLUAV AG SPEC QL IA: NORMAL
FLUBV AG SPEC QL IA: NORMAL

## 2019-02-13 ENCOUNTER — COMMUNICATION - HEALTHEAST (OUTPATIENT)
Dept: INTERNAL MEDICINE | Facility: CLINIC | Age: 84
End: 2019-02-13

## 2019-02-13 ENCOUNTER — HOSPITAL ENCOUNTER (OUTPATIENT)
Dept: CT IMAGING | Facility: CLINIC | Age: 84
Discharge: HOME | End: 2019-02-13
Attending: INTERNAL MEDICINE

## 2019-02-13 DIAGNOSIS — J01.01 ACUTE RECURRENT MAXILLARY SINUSITIS: ICD-10-CM

## 2019-02-14 ENCOUNTER — COMMUNICATION - HEALTHEAST (OUTPATIENT)
Dept: INTERNAL MEDICINE | Facility: CLINIC | Age: 84
End: 2019-02-14

## 2019-02-18 ENCOUNTER — OFFICE VISIT - HEALTHEAST (OUTPATIENT)
Dept: INTERNAL MEDICINE | Facility: CLINIC | Age: 84
End: 2019-02-18

## 2019-02-18 DIAGNOSIS — J32.0 MAXILLARY SINUSITIS, UNSPECIFIED CHRONICITY: ICD-10-CM

## 2019-02-25 ENCOUNTER — OFFICE VISIT - HEALTHEAST (OUTPATIENT)
Dept: OTOLARYNGOLOGY | Facility: CLINIC | Age: 84
End: 2019-02-25

## 2019-02-25 DIAGNOSIS — J31.0 CHRONIC RHINITIS: ICD-10-CM

## 2019-03-19 ENCOUNTER — COMMUNICATION - HEALTHEAST (OUTPATIENT)
Dept: INTERNAL MEDICINE | Facility: CLINIC | Age: 84
End: 2019-03-19

## 2019-03-19 DIAGNOSIS — F41.1 ANXIETY STATE: ICD-10-CM

## 2019-04-15 ENCOUNTER — RECORDS - HEALTHEAST (OUTPATIENT)
Dept: ADMINISTRATIVE | Facility: OTHER | Age: 84
End: 2019-04-15

## 2019-04-18 ENCOUNTER — OFFICE VISIT - HEALTHEAST (OUTPATIENT)
Dept: INTERNAL MEDICINE | Facility: CLINIC | Age: 84
End: 2019-04-18

## 2019-04-18 DIAGNOSIS — M81.0 AGE RELATED OSTEOPOROSIS, UNSPECIFIED PATHOLOGICAL FRACTURE PRESENCE: ICD-10-CM

## 2019-05-15 ENCOUNTER — COMMUNICATION - HEALTHEAST (OUTPATIENT)
Dept: ENDOCRINOLOGY | Facility: CLINIC | Age: 84
End: 2019-05-15

## 2019-06-07 ENCOUNTER — AMBULATORY - HEALTHEAST (OUTPATIENT)
Dept: ENDOCRINOLOGY | Facility: CLINIC | Age: 84
End: 2019-06-07

## 2019-06-07 DIAGNOSIS — M81.0 OSTEOPOROSIS WITHOUT CURRENT PATHOLOGICAL FRACTURE, UNSPECIFIED OSTEOPOROSIS TYPE: ICD-10-CM

## 2019-07-10 ENCOUNTER — COMMUNICATION - HEALTHEAST (OUTPATIENT)
Dept: INTERNAL MEDICINE | Facility: CLINIC | Age: 84
End: 2019-07-10

## 2019-07-10 DIAGNOSIS — K21.9 ESOPHAGEAL REFLUX: ICD-10-CM

## 2019-07-10 DIAGNOSIS — E11.9 TYPE 2 DIABETES MELLITUS (H): ICD-10-CM

## 2019-07-19 ENCOUNTER — RECORDS - HEALTHEAST (OUTPATIENT)
Dept: HEALTH INFORMATION MANAGEMENT | Facility: CLINIC | Age: 84
End: 2019-07-19

## 2019-07-19 ENCOUNTER — OFFICE VISIT - HEALTHEAST (OUTPATIENT)
Dept: INTERNAL MEDICINE | Facility: CLINIC | Age: 84
End: 2019-07-19

## 2019-07-19 DIAGNOSIS — I10 ESSENTIAL HYPERTENSION: ICD-10-CM

## 2019-07-19 DIAGNOSIS — E78.2 MIXED HYPERLIPIDEMIA: ICD-10-CM

## 2019-07-19 DIAGNOSIS — E11.9 TYPE 2 DIABETES MELLITUS WITHOUT COMPLICATION, WITHOUT LONG-TERM CURRENT USE OF INSULIN (H): ICD-10-CM

## 2019-07-19 LAB
CHOLEST SERPL-MCNC: 135 MG/DL
CREAT UR-MCNC: 158 MG/DL
FASTING STATUS PATIENT QL REPORTED: YES
HBA1C MFR BLD: 6.8 % (ref 3.5–6)
HDLC SERPL-MCNC: 57 MG/DL
LDLC SERPL CALC-MCNC: 66 MG/DL
MICROALBUMIN UR-MCNC: 4.21 MG/DL (ref 0–1.99)
MICROALBUMIN/CREAT UR: 26.6 MG/G
TRIGL SERPL-MCNC: 61 MG/DL

## 2019-08-28 ENCOUNTER — OFFICE VISIT - HEALTHEAST (OUTPATIENT)
Dept: PHARMACY | Facility: CLINIC | Age: 84
End: 2019-08-28

## 2019-08-28 DIAGNOSIS — I10 ESSENTIAL HYPERTENSION: ICD-10-CM

## 2019-08-28 DIAGNOSIS — E78.2 MIXED HYPERLIPIDEMIA: ICD-10-CM

## 2019-08-28 DIAGNOSIS — F41.1 ANXIETY STATE: ICD-10-CM

## 2019-08-28 DIAGNOSIS — E11.9 TYPE 2 DIABETES MELLITUS (H): ICD-10-CM

## 2019-08-28 DIAGNOSIS — I10 ESSENTIAL HYPERTENSION, MALIGNANT: ICD-10-CM

## 2019-08-28 DIAGNOSIS — M81.0 OSTEOPOROSIS WITHOUT CURRENT PATHOLOGICAL FRACTURE, UNSPECIFIED OSTEOPOROSIS TYPE: ICD-10-CM

## 2019-08-28 DIAGNOSIS — E11.9 TYPE 2 DIABETES MELLITUS WITHOUT COMPLICATION, WITHOUT LONG-TERM CURRENT USE OF INSULIN (H): ICD-10-CM

## 2019-08-28 DIAGNOSIS — K21.9 GASTROESOPHAGEAL REFLUX DISEASE WITHOUT ESOPHAGITIS: ICD-10-CM

## 2019-08-28 DIAGNOSIS — Z78.9 TAKES DIETARY SUPPLEMENTS: ICD-10-CM

## 2019-09-11 ENCOUNTER — OFFICE VISIT - HEALTHEAST (OUTPATIENT)
Dept: PHARMACY | Facility: CLINIC | Age: 84
End: 2019-09-11

## 2019-09-11 DIAGNOSIS — E11.9 TYPE 2 DIABETES MELLITUS WITHOUT COMPLICATION, WITHOUT LONG-TERM CURRENT USE OF INSULIN (H): ICD-10-CM

## 2019-09-11 DIAGNOSIS — M81.0 OSTEOPOROSIS WITHOUT CURRENT PATHOLOGICAL FRACTURE, UNSPECIFIED OSTEOPOROSIS TYPE: ICD-10-CM

## 2019-09-11 DIAGNOSIS — Z78.9 TAKES DIETARY SUPPLEMENTS: ICD-10-CM

## 2019-09-11 DIAGNOSIS — E78.2 MIXED HYPERLIPIDEMIA: ICD-10-CM

## 2019-09-11 DIAGNOSIS — I10 ESSENTIAL HYPERTENSION: ICD-10-CM

## 2019-09-11 DIAGNOSIS — K21.9 GASTROESOPHAGEAL REFLUX DISEASE WITHOUT ESOPHAGITIS: ICD-10-CM

## 2019-09-11 DIAGNOSIS — F41.1 ANXIETY STATE: ICD-10-CM

## 2019-09-19 ENCOUNTER — COMMUNICATION - HEALTHEAST (OUTPATIENT)
Dept: FAMILY MEDICINE | Facility: CLINIC | Age: 84
End: 2019-09-19

## 2019-09-19 ENCOUNTER — OFFICE VISIT - HEALTHEAST (OUTPATIENT)
Dept: INTERNAL MEDICINE | Facility: CLINIC | Age: 84
End: 2019-09-19

## 2019-09-19 DIAGNOSIS — I10 ESSENTIAL HYPERTENSION: ICD-10-CM

## 2019-09-19 DIAGNOSIS — E11.9 TYPE 2 DIABETES MELLITUS WITHOUT COMPLICATION, WITHOUT LONG-TERM CURRENT USE OF INSULIN (H): ICD-10-CM

## 2019-09-19 DIAGNOSIS — E11.9 DIABETES MELLITUS, TYPE 2 (H): ICD-10-CM

## 2019-09-19 DIAGNOSIS — E78.2 MIXED HYPERLIPIDEMIA: ICD-10-CM

## 2019-09-19 RX ORDER — LANCETS 30 GAUGE
EACH MISCELLANEOUS
Qty: 200 EACH | Refills: 2 | Status: SHIPPED | OUTPATIENT
Start: 2019-09-19

## 2019-09-20 ENCOUNTER — COMMUNICATION - HEALTHEAST (OUTPATIENT)
Dept: INTERNAL MEDICINE | Facility: CLINIC | Age: 84
End: 2019-09-20

## 2019-09-20 DIAGNOSIS — E78.5 HYPERLIPIDEMIA: ICD-10-CM

## 2019-09-25 ENCOUNTER — COMMUNICATION - HEALTHEAST (OUTPATIENT)
Dept: SCHEDULING | Facility: CLINIC | Age: 84
End: 2019-09-25

## 2019-09-25 DIAGNOSIS — E11.9 TYPE 2 DIABETES MELLITUS WITHOUT COMPLICATION, WITHOUT LONG-TERM CURRENT USE OF INSULIN (H): ICD-10-CM

## 2019-09-26 ENCOUNTER — COMMUNICATION - HEALTHEAST (OUTPATIENT)
Dept: INTERNAL MEDICINE | Facility: CLINIC | Age: 84
End: 2019-09-26

## 2019-09-30 ENCOUNTER — COMMUNICATION - HEALTHEAST (OUTPATIENT)
Dept: PHARMACY | Facility: CLINIC | Age: 84
End: 2019-09-30

## 2019-10-01 ENCOUNTER — OFFICE VISIT - HEALTHEAST (OUTPATIENT)
Dept: INTERNAL MEDICINE | Facility: CLINIC | Age: 84
End: 2019-10-01

## 2019-10-01 DIAGNOSIS — E11.9 TYPE 2 DIABETES MELLITUS WITHOUT COMPLICATION, WITHOUT LONG-TERM CURRENT USE OF INSULIN (H): ICD-10-CM

## 2019-10-03 ENCOUNTER — RECORDS - HEALTHEAST (OUTPATIENT)
Dept: ADMINISTRATIVE | Facility: OTHER | Age: 84
End: 2019-10-03

## 2019-10-04 ENCOUNTER — AMBULATORY - HEALTHEAST (OUTPATIENT)
Dept: ENDOCRINOLOGY | Facility: CLINIC | Age: 84
End: 2019-10-04

## 2019-10-04 ENCOUNTER — COMMUNICATION - HEALTHEAST (OUTPATIENT)
Dept: ENDOCRINOLOGY | Facility: CLINIC | Age: 84
End: 2019-10-04

## 2019-10-04 DIAGNOSIS — M81.0 OSTEOPOROSIS WITHOUT CURRENT PATHOLOGICAL FRACTURE, UNSPECIFIED OSTEOPOROSIS TYPE: ICD-10-CM

## 2019-10-05 ENCOUNTER — COMMUNICATION - HEALTHEAST (OUTPATIENT)
Dept: INTERNAL MEDICINE | Facility: CLINIC | Age: 84
End: 2019-10-05

## 2019-10-05 DIAGNOSIS — E11.9 TYPE 2 DIABETES MELLITUS (H): ICD-10-CM

## 2019-10-05 DIAGNOSIS — K21.9 ESOPHAGEAL REFLUX: ICD-10-CM

## 2019-10-08 ENCOUNTER — COMMUNICATION - HEALTHEAST (OUTPATIENT)
Dept: INTERNAL MEDICINE | Facility: CLINIC | Age: 84
End: 2019-10-08

## 2019-10-08 ENCOUNTER — RECORDS - HEALTHEAST (OUTPATIENT)
Dept: ADMINISTRATIVE | Facility: OTHER | Age: 84
End: 2019-10-08

## 2019-10-23 ENCOUNTER — COMMUNICATION - HEALTHEAST (OUTPATIENT)
Dept: INTERNAL MEDICINE | Facility: CLINIC | Age: 84
End: 2019-10-23

## 2019-10-24 ENCOUNTER — RECORDS - HEALTHEAST (OUTPATIENT)
Dept: HEALTH INFORMATION MANAGEMENT | Facility: CLINIC | Age: 84
End: 2019-10-24

## 2019-11-04 ENCOUNTER — RECORDS - HEALTHEAST (OUTPATIENT)
Dept: BONE DENSITY | Facility: CLINIC | Age: 84
End: 2019-11-04

## 2019-11-04 ENCOUNTER — COMMUNICATION - HEALTHEAST (OUTPATIENT)
Dept: ADMINISTRATIVE | Facility: CLINIC | Age: 84
End: 2019-11-04

## 2019-11-04 DIAGNOSIS — M81.0 AGE-RELATED OSTEOPOROSIS WITHOUT CURRENT PATHOLOGICAL FRACTURE: ICD-10-CM

## 2019-11-06 ENCOUNTER — OFFICE VISIT - HEALTHEAST (OUTPATIENT)
Dept: PHARMACY | Facility: CLINIC | Age: 84
End: 2019-11-06

## 2019-11-06 ENCOUNTER — AMBULATORY - HEALTHEAST (OUTPATIENT)
Dept: LAB | Facility: CLINIC | Age: 84
End: 2019-11-06

## 2019-11-06 DIAGNOSIS — F41.1 ANXIETY STATE: ICD-10-CM

## 2019-11-06 DIAGNOSIS — E11.9 TYPE 2 DIABETES MELLITUS WITHOUT COMPLICATION, WITHOUT LONG-TERM CURRENT USE OF INSULIN (H): ICD-10-CM

## 2019-11-06 DIAGNOSIS — K21.9 GASTROESOPHAGEAL REFLUX DISEASE WITHOUT ESOPHAGITIS: ICD-10-CM

## 2019-11-06 DIAGNOSIS — M81.0 OSTEOPOROSIS WITHOUT CURRENT PATHOLOGICAL FRACTURE, UNSPECIFIED OSTEOPOROSIS TYPE: ICD-10-CM

## 2019-11-06 DIAGNOSIS — E78.2 MIXED HYPERLIPIDEMIA: ICD-10-CM

## 2019-11-06 DIAGNOSIS — I10 ESSENTIAL HYPERTENSION: ICD-10-CM

## 2019-11-06 LAB
ANION GAP SERPL CALCULATED.3IONS-SCNC: 11 MMOL/L (ref 5–18)
BUN SERPL-MCNC: 22 MG/DL (ref 8–28)
CALCIUM SERPL-MCNC: 9 MG/DL (ref 8.5–10.5)
CHLORIDE BLD-SCNC: 105 MMOL/L (ref 98–107)
CO2 SERPL-SCNC: 25 MMOL/L (ref 22–31)
CREAT SERPL-MCNC: 1.18 MG/DL (ref 0.6–1.1)
GFR SERPL CREATININE-BSD FRML MDRD: 44 ML/MIN/1.73M2
GLUCOSE BLD-MCNC: 147 MG/DL (ref 70–125)
HBA1C MFR BLD: 7.6 % (ref 3.5–6)
POTASSIUM BLD-SCNC: 4.3 MMOL/L (ref 3.5–5)
SODIUM SERPL-SCNC: 141 MMOL/L (ref 136–145)

## 2019-11-19 ENCOUNTER — AMBULATORY - HEALTHEAST (OUTPATIENT)
Dept: LAB | Facility: CLINIC | Age: 84
End: 2019-11-19

## 2019-11-19 ENCOUNTER — OFFICE VISIT - HEALTHEAST (OUTPATIENT)
Dept: INTERNAL MEDICINE | Facility: CLINIC | Age: 84
End: 2019-11-19

## 2019-11-19 DIAGNOSIS — I10 ESSENTIAL HYPERTENSION: ICD-10-CM

## 2019-11-19 DIAGNOSIS — E78.2 MIXED HYPERLIPIDEMIA: ICD-10-CM

## 2019-11-19 DIAGNOSIS — E11.9 TYPE 2 DIABETES MELLITUS WITHOUT COMPLICATION, WITHOUT LONG-TERM CURRENT USE OF INSULIN (H): ICD-10-CM

## 2019-11-19 LAB — HBA1C MFR BLD: 7.4 % (ref 3.5–6)

## 2019-12-11 ENCOUNTER — RECORDS - HEALTHEAST (OUTPATIENT)
Dept: ADMINISTRATIVE | Facility: OTHER | Age: 84
End: 2019-12-11

## 2019-12-11 LAB — RETINOPATHY: NEGATIVE

## 2019-12-15 ENCOUNTER — COMMUNICATION - HEALTHEAST (OUTPATIENT)
Dept: INTERNAL MEDICINE | Facility: CLINIC | Age: 84
End: 2019-12-15

## 2019-12-15 DIAGNOSIS — M81.0 OSTEOPOROSIS: ICD-10-CM

## 2019-12-20 ENCOUNTER — HOSPITAL ENCOUNTER (OUTPATIENT)
Dept: MAMMOGRAPHY | Facility: CLINIC | Age: 84
Discharge: HOME | End: 2019-12-20
Attending: INTERNAL MEDICINE

## 2019-12-20 DIAGNOSIS — Z12.31 VISIT FOR SCREENING MAMMOGRAM: ICD-10-CM

## 2019-12-26 ENCOUNTER — AMBULATORY - HEALTHEAST (OUTPATIENT)
Dept: ENDOCRINOLOGY | Facility: CLINIC | Age: 84
End: 2019-12-26

## 2019-12-26 DIAGNOSIS — Z92.29 PERSONAL HISTORY OF OTHER DRUG THERAPY: ICD-10-CM

## 2019-12-26 DIAGNOSIS — M81.0 OSTEOPOROSIS WITHOUT CURRENT PATHOLOGICAL FRACTURE, UNSPECIFIED OSTEOPOROSIS TYPE: ICD-10-CM

## 2020-01-03 ENCOUNTER — RECORDS - HEALTHEAST (OUTPATIENT)
Dept: ADMINISTRATIVE | Facility: OTHER | Age: 85
End: 2020-01-03

## 2020-01-03 ENCOUNTER — AMBULATORY - HEALTHEAST (OUTPATIENT)
Dept: ENDOCRINOLOGY | Facility: CLINIC | Age: 85
End: 2020-01-03

## 2020-01-03 ENCOUNTER — COMMUNICATION - HEALTHEAST (OUTPATIENT)
Dept: INTERNAL MEDICINE | Facility: CLINIC | Age: 85
End: 2020-01-03

## 2020-01-03 DIAGNOSIS — K21.9 ESOPHAGEAL REFLUX: ICD-10-CM

## 2020-01-23 ENCOUNTER — OFFICE VISIT - HEALTHEAST (OUTPATIENT)
Dept: INTERNAL MEDICINE | Facility: CLINIC | Age: 85
End: 2020-01-23

## 2020-01-23 ENCOUNTER — OFFICE VISIT - HEALTHEAST (OUTPATIENT)
Dept: ENDOCRINOLOGY | Facility: CLINIC | Age: 85
End: 2020-01-23

## 2020-01-23 DIAGNOSIS — Z79.4 DIABETES MELLITUS DUE TO UNDERLYING CONDITION WITHOUT COMPLICATION, WITH LONG-TERM CURRENT USE OF INSULIN (H): ICD-10-CM

## 2020-01-23 DIAGNOSIS — E78.2 MIXED HYPERLIPIDEMIA: ICD-10-CM

## 2020-01-23 DIAGNOSIS — Z00.00 ROUTINE GENERAL MEDICAL EXAMINATION AT A HEALTH CARE FACILITY: ICD-10-CM

## 2020-01-23 DIAGNOSIS — D47.2 IGA MONOCLONAL GAMMOPATHY OF UNCERTAIN SIGNIFICANCE: ICD-10-CM

## 2020-01-23 DIAGNOSIS — I10 ESSENTIAL HYPERTENSION: ICD-10-CM

## 2020-01-23 DIAGNOSIS — M81.0 OSTEOPOROSIS WITHOUT CURRENT PATHOLOGICAL FRACTURE, UNSPECIFIED OSTEOPOROSIS TYPE: ICD-10-CM

## 2020-01-23 DIAGNOSIS — E08.9 DIABETES MELLITUS DUE TO UNDERLYING CONDITION WITHOUT COMPLICATION, WITH LONG-TERM CURRENT USE OF INSULIN (H): ICD-10-CM

## 2020-01-23 DIAGNOSIS — E11.9 TYPE 2 DIABETES MELLITUS WITHOUT COMPLICATION, WITHOUT LONG-TERM CURRENT USE OF INSULIN (H): ICD-10-CM

## 2020-01-23 DIAGNOSIS — Z00.01 ENCOUNTER FOR GENERAL ADULT MEDICAL EXAMINATION WITH ABNORMAL FINDINGS: ICD-10-CM

## 2020-01-23 DIAGNOSIS — E13.9 OTHER SPECIFIED DIABETES MELLITUS WITHOUT COMPLICATIONS (H): ICD-10-CM

## 2020-01-23 LAB
ALBUMIN SERPL-MCNC: 3.7 G/DL (ref 3.5–5)
ALP SERPL-CCNC: 73 U/L (ref 45–120)
ALT SERPL W P-5'-P-CCNC: 14 U/L (ref 0–45)
ANION GAP SERPL CALCULATED.3IONS-SCNC: 10 MMOL/L (ref 5–18)
AST SERPL W P-5'-P-CCNC: 18 U/L (ref 0–40)
BILIRUB SERPL-MCNC: 0.8 MG/DL (ref 0–1)
BUN SERPL-MCNC: 31 MG/DL (ref 8–28)
CALCIUM SERPL-MCNC: 9.3 MG/DL (ref 8.5–10.5)
CHLORIDE BLD-SCNC: 105 MMOL/L (ref 98–107)
CO2 SERPL-SCNC: 25 MMOL/L (ref 22–31)
CREAT SERPL-MCNC: 1.3 MG/DL (ref 0.6–1.1)
ERYTHROCYTE [DISTWIDTH] IN BLOOD BY AUTOMATED COUNT: 11.8 % (ref 11–14.5)
FERRITIN SERPL-MCNC: 79 NG/ML (ref 10–130)
GFR SERPL CREATININE-BSD FRML MDRD: 39 ML/MIN/1.73M2
GLUCOSE BLD-MCNC: 123 MG/DL (ref 70–125)
HCT VFR BLD AUTO: 33.5 % (ref 35–47)
HGB BLD-MCNC: 10.9 G/DL (ref 12–16)
IRON SATN MFR SERPL: 26 % (ref 20–50)
IRON SERPL-MCNC: 92 UG/DL (ref 42–175)
MCH RBC QN AUTO: 31.1 PG (ref 27–34)
MCHC RBC AUTO-ENTMCNC: 32.5 G/DL (ref 32–36)
MCV RBC AUTO: 96 FL (ref 80–100)
PLATELET # BLD AUTO: 240 THOU/UL (ref 140–440)
PMV BLD AUTO: 7.2 FL (ref 7–10)
POTASSIUM BLD-SCNC: 4.6 MMOL/L (ref 3.5–5)
PROT SERPL-MCNC: 7.3 G/DL (ref 6–8)
RBC # BLD AUTO: 3.5 MILL/UL (ref 3.8–5.4)
SODIUM SERPL-SCNC: 140 MMOL/L (ref 136–145)
TIBC SERPL-MCNC: 349 UG/DL (ref 313–563)
TRANSFERRIN SERPL-MCNC: 279 MG/DL (ref 212–360)
WBC: 5.2 THOU/UL (ref 4–11)

## 2020-01-23 ASSESSMENT — MIFFLIN-ST. JEOR
SCORE: 984.89
SCORE: 984.89

## 2020-01-24 ENCOUNTER — RECORDS - HEALTHEAST (OUTPATIENT)
Dept: HEALTH INFORMATION MANAGEMENT | Facility: CLINIC | Age: 85
End: 2020-01-24

## 2020-01-24 LAB
25(OH)D3 SERPL-MCNC: 58.1 NG/ML (ref 30–80)
25(OH)D3 SERPL-MCNC: 58.1 NG/ML (ref 30–80)

## 2020-02-12 ENCOUNTER — COMMUNICATION - HEALTHEAST (OUTPATIENT)
Dept: INTERNAL MEDICINE | Facility: CLINIC | Age: 85
End: 2020-02-12

## 2020-02-12 DIAGNOSIS — E11.9 TYPE 2 DIABETES MELLITUS WITHOUT COMPLICATION, WITHOUT LONG-TERM CURRENT USE OF INSULIN (H): ICD-10-CM

## 2020-02-13 RX ORDER — GLUCOSAMINE HCL/CHONDROITIN SU 500-400 MG
CAPSULE ORAL
Qty: 100 STRIP | Refills: 11 | Status: SHIPPED | OUTPATIENT
Start: 2020-02-13

## 2020-02-27 ENCOUNTER — RECORDS - HEALTHEAST (OUTPATIENT)
Dept: ADMINISTRATIVE | Facility: OTHER | Age: 85
End: 2020-02-27

## 2020-03-13 ENCOUNTER — RECORDS - HEALTHEAST (OUTPATIENT)
Dept: ADMINISTRATIVE | Facility: OTHER | Age: 85
End: 2020-03-13

## 2020-03-22 ENCOUNTER — COMMUNICATION - HEALTHEAST (OUTPATIENT)
Dept: INTERNAL MEDICINE | Facility: CLINIC | Age: 85
End: 2020-03-22

## 2020-03-22 DIAGNOSIS — E78.5 HYPERLIPIDEMIA: ICD-10-CM

## 2020-03-27 ENCOUNTER — COMMUNICATION - HEALTHEAST (OUTPATIENT)
Dept: INTERNAL MEDICINE | Facility: CLINIC | Age: 85
End: 2020-03-27

## 2020-03-27 DIAGNOSIS — E11.9 TYPE 2 DIABETES MELLITUS WITHOUT COMPLICATION, WITHOUT LONG-TERM CURRENT USE OF INSULIN (H): ICD-10-CM

## 2020-07-16 ENCOUNTER — AMBULATORY - HEALTHEAST (OUTPATIENT)
Dept: ENDOCRINOLOGY | Facility: CLINIC | Age: 85
End: 2020-07-16

## 2020-09-14 ENCOUNTER — COMMUNICATION - HEALTHEAST (OUTPATIENT)
Dept: INTERNAL MEDICINE | Facility: CLINIC | Age: 85
End: 2020-09-14

## 2020-09-14 DIAGNOSIS — I10 ESSENTIAL HYPERTENSION, MALIGNANT: ICD-10-CM

## 2020-09-21 ENCOUNTER — COMMUNICATION - HEALTHEAST (OUTPATIENT)
Dept: INTERNAL MEDICINE | Facility: CLINIC | Age: 85
End: 2020-09-21

## 2020-09-21 DIAGNOSIS — E11.9 TYPE 2 DIABETES MELLITUS WITHOUT COMPLICATION, WITHOUT LONG-TERM CURRENT USE OF INSULIN (H): ICD-10-CM

## 2020-09-28 ENCOUNTER — AMBULATORY - HEALTHEAST (OUTPATIENT)
Dept: INTERNAL MEDICINE | Facility: CLINIC | Age: 85
End: 2020-09-28

## 2020-09-28 ENCOUNTER — AMBULATORY - HEALTHEAST (OUTPATIENT)
Dept: LAB | Facility: CLINIC | Age: 85
End: 2020-09-28

## 2020-09-28 DIAGNOSIS — E11.9 TYPE 2 DIABETES MELLITUS WITHOUT COMPLICATION, WITHOUT LONG-TERM CURRENT USE OF INSULIN (H): ICD-10-CM

## 2020-09-28 LAB
ALBUMIN SERPL-MCNC: 3.8 G/DL (ref 3.5–5)
ALP SERPL-CCNC: 73 U/L (ref 45–120)
ALT SERPL W P-5'-P-CCNC: 20 U/L (ref 0–45)
ANION GAP SERPL CALCULATED.3IONS-SCNC: 12 MMOL/L (ref 5–18)
AST SERPL W P-5'-P-CCNC: 20 U/L (ref 0–40)
BILIRUB SERPL-MCNC: 0.7 MG/DL (ref 0–1)
BUN SERPL-MCNC: 29 MG/DL (ref 8–28)
CALCIUM SERPL-MCNC: 9.6 MG/DL (ref 8.5–10.5)
CHLORIDE BLD-SCNC: 104 MMOL/L (ref 98–107)
CHOLEST SERPL-MCNC: 168 MG/DL
CO2 SERPL-SCNC: 25 MMOL/L (ref 22–31)
CREAT SERPL-MCNC: 1.25 MG/DL (ref 0.6–1.1)
FASTING STATUS PATIENT QL REPORTED: YES
GFR SERPL CREATININE-BSD FRML MDRD: 41 ML/MIN/1.73M2
GLUCOSE BLD-MCNC: 135 MG/DL (ref 70–125)
HBA1C MFR BLD: 7.6 %
HDLC SERPL-MCNC: 57 MG/DL
LDLC SERPL CALC-MCNC: 93 MG/DL
POTASSIUM BLD-SCNC: 4.2 MMOL/L (ref 3.5–5)
PROT SERPL-MCNC: 7.3 G/DL (ref 6–8)
SODIUM SERPL-SCNC: 141 MMOL/L (ref 136–145)
TRIGL SERPL-MCNC: 88 MG/DL

## 2020-09-29 ENCOUNTER — OFFICE VISIT - HEALTHEAST (OUTPATIENT)
Dept: INTERNAL MEDICINE | Facility: CLINIC | Age: 85
End: 2020-09-29

## 2020-09-29 DIAGNOSIS — E78.2 MIXED HYPERLIPIDEMIA: ICD-10-CM

## 2020-09-29 DIAGNOSIS — I10 ESSENTIAL HYPERTENSION: ICD-10-CM

## 2020-09-29 DIAGNOSIS — E11.9 TYPE 2 DIABETES MELLITUS WITHOUT COMPLICATION, WITHOUT LONG-TERM CURRENT USE OF INSULIN (H): ICD-10-CM

## 2020-09-29 LAB
CREAT UR-MCNC: 216.9 MG/DL
MICROALBUMIN UR-MCNC: 10.61 MG/DL (ref 0–1.99)
MICROALBUMIN/CREAT UR: 48.9 MG/G

## 2020-11-23 ENCOUNTER — COMMUNICATION - HEALTHEAST (OUTPATIENT)
Dept: INTERNAL MEDICINE | Facility: CLINIC | Age: 85
End: 2020-11-23

## 2020-11-23 DIAGNOSIS — F41.1 ANXIETY STATE: ICD-10-CM

## 2020-12-23 ENCOUNTER — HOSPITAL ENCOUNTER (OUTPATIENT)
Dept: MAMMOGRAPHY | Facility: CLINIC | Age: 85
Discharge: HOME | End: 2020-12-23
Attending: INTERNAL MEDICINE

## 2020-12-23 DIAGNOSIS — Z12.31 VISIT FOR SCREENING MAMMOGRAM: ICD-10-CM

## 2020-12-30 ENCOUNTER — COMMUNICATION - HEALTHEAST (OUTPATIENT)
Dept: INTERNAL MEDICINE | Facility: CLINIC | Age: 85
End: 2020-12-30

## 2020-12-30 DIAGNOSIS — M81.0 OSTEOPOROSIS: ICD-10-CM

## 2020-12-30 DIAGNOSIS — E78.5 HYPERLIPIDEMIA: ICD-10-CM

## 2020-12-30 DIAGNOSIS — K21.9 ESOPHAGEAL REFLUX: ICD-10-CM

## 2020-12-31 RX ORDER — ATORVASTATIN CALCIUM 40 MG/1
TABLET, FILM COATED ORAL
Qty: 90 TABLET | Refills: 3 | Status: SHIPPED | OUTPATIENT
Start: 2020-12-31 | End: 2021-12-14

## 2021-01-21 ENCOUNTER — AMBULATORY - HEALTHEAST (OUTPATIENT)
Dept: LAB | Facility: CLINIC | Age: 86
End: 2021-01-21

## 2021-01-21 ENCOUNTER — AMBULATORY - HEALTHEAST (OUTPATIENT)
Dept: ENDOCRINOLOGY | Facility: CLINIC | Age: 86
End: 2021-01-21

## 2021-01-21 DIAGNOSIS — M81.0 OSTEOPOROSIS WITHOUT CURRENT PATHOLOGICAL FRACTURE, UNSPECIFIED OSTEOPOROSIS TYPE: ICD-10-CM

## 2021-01-22 ENCOUNTER — OFFICE VISIT - HEALTHEAST (OUTPATIENT)
Dept: ENDOCRINOLOGY | Facility: CLINIC | Age: 86
End: 2021-01-22

## 2021-01-22 DIAGNOSIS — Z92.29 PERSONAL HISTORY OF OTHER DRUG THERAPY: ICD-10-CM

## 2021-01-22 DIAGNOSIS — M81.0 OSTEOPOROSIS WITHOUT CURRENT PATHOLOGICAL FRACTURE, UNSPECIFIED OSTEOPOROSIS TYPE: ICD-10-CM

## 2021-01-22 LAB
25(OH)D3 SERPL-MCNC: 42.1 NG/ML (ref 30–80)
25(OH)D3 SERPL-MCNC: 42.1 NG/ML (ref 30–80)

## 2021-02-02 ENCOUNTER — AMBULATORY - HEALTHEAST (OUTPATIENT)
Dept: ENDOCRINOLOGY | Facility: CLINIC | Age: 86
End: 2021-02-02

## 2021-02-02 DIAGNOSIS — M81.0 OSTEOPOROSIS WITHOUT CURRENT PATHOLOGICAL FRACTURE, UNSPECIFIED OSTEOPOROSIS TYPE: ICD-10-CM

## 2021-02-05 ENCOUNTER — RECORDS - HEALTHEAST (OUTPATIENT)
Dept: ADMINISTRATIVE | Facility: OTHER | Age: 86
End: 2021-02-05

## 2021-02-05 LAB — RETINOPATHY: POSITIVE

## 2021-02-09 ENCOUNTER — RECORDS - HEALTHEAST (OUTPATIENT)
Dept: HEALTH INFORMATION MANAGEMENT | Facility: CLINIC | Age: 86
End: 2021-02-09

## 2021-03-18 ENCOUNTER — COMMUNICATION - HEALTHEAST (OUTPATIENT)
Dept: INTERNAL MEDICINE | Facility: CLINIC | Age: 86
End: 2021-03-18

## 2021-03-18 DIAGNOSIS — I10 ESSENTIAL HYPERTENSION, MALIGNANT: ICD-10-CM

## 2021-03-22 ENCOUNTER — COMMUNICATION - HEALTHEAST (OUTPATIENT)
Dept: INTERNAL MEDICINE | Facility: CLINIC | Age: 86
End: 2021-03-22

## 2021-03-22 DIAGNOSIS — I10 ESSENTIAL HYPERTENSION, MALIGNANT: ICD-10-CM

## 2021-03-22 RX ORDER — LISINOPRIL 10 MG/1
10 TABLET ORAL DAILY
Qty: 90 TABLET | Refills: 3 | Status: SHIPPED | OUTPATIENT
Start: 2021-03-22 | End: 2022-03-09

## 2021-04-01 ENCOUNTER — COMMUNICATION - HEALTHEAST (OUTPATIENT)
Dept: INTERNAL MEDICINE | Facility: CLINIC | Age: 86
End: 2021-04-01

## 2021-04-01 DIAGNOSIS — M81.0 OSTEOPOROSIS: ICD-10-CM

## 2021-04-30 ENCOUNTER — OFFICE VISIT - HEALTHEAST (OUTPATIENT)
Dept: INTERNAL MEDICINE | Facility: CLINIC | Age: 86
End: 2021-04-30

## 2021-04-30 DIAGNOSIS — E11.9 TYPE 2 DIABETES MELLITUS WITHOUT COMPLICATION, WITHOUT LONG-TERM CURRENT USE OF INSULIN (H): ICD-10-CM

## 2021-04-30 DIAGNOSIS — E78.2 MIXED HYPERLIPIDEMIA: ICD-10-CM

## 2021-04-30 DIAGNOSIS — I10 ESSENTIAL HYPERTENSION: ICD-10-CM

## 2021-04-30 LAB — HBA1C MFR BLD: 7.1 %

## 2021-05-06 ENCOUNTER — COMMUNICATION - HEALTHEAST (OUTPATIENT)
Dept: INTERNAL MEDICINE | Facility: CLINIC | Age: 86
End: 2021-05-06

## 2021-05-06 DIAGNOSIS — E11.9 TYPE 2 DIABETES MELLITUS WITHOUT COMPLICATION, WITHOUT LONG-TERM CURRENT USE OF INSULIN (H): ICD-10-CM

## 2021-05-06 RX ORDER — PIOGLITAZONEHYDROCHLORIDE 15 MG/1
15 TABLET ORAL DAILY
Qty: 90 TABLET | Refills: 3 | Status: SHIPPED | OUTPATIENT
Start: 2021-05-06 | End: 2022-05-31

## 2021-05-25 ENCOUNTER — RECORDS - HEALTHEAST (OUTPATIENT)
Dept: ADMINISTRATIVE | Facility: CLINIC | Age: 86
End: 2021-05-25

## 2021-05-26 VITALS — HEART RATE: 80 BPM | DIASTOLIC BLOOD PRESSURE: 68 MMHG | SYSTOLIC BLOOD PRESSURE: 110 MMHG

## 2021-05-27 NOTE — PROGRESS NOTES
"Silvia is an 82 y/o ex patient of Dr. Sheri Avila who comes in today for a referral for a DEXA scan as well as a \"meet and greet\" visit.  She has a history of osteoporosis and is due for a repeat DEXA scan.  This order was placed here today.  She has a history of multiple medical problems including diabetes, hypertension, hyperlipidemia, a history of breast cancer, and a history of MGUS as well.  Her last diabetic labs were in January which were under good control.  She is going to set up an appointment with me in July of this year for a diabetic check.  She is also interested in establishing care with me and even though I told her it would be approximately 8 to 9months before I would have availability for this.    Objective: Vital signs are as per the EMR.  In general the patient is alert pleasant and in no acute distress.  She appears healthy.    Assessment and plan: Patient with a history of osteoporosis, diabetes who presents today in need of a DEXA scan.  Again this order was placed today.  She is going to follow-up in 3 months for a diabetic check.  She is also going to establish care with me in January 2020.  "

## 2021-05-28 NOTE — TELEPHONE ENCOUNTER
FYI    Per [employer], no PA needed 05/15/2019. Paperwork sent to scanned.     There will be copay. Pt need to call insurance to find out copay cost.

## 2021-05-28 NOTE — TELEPHONE ENCOUNTER
Lm informing patient that she'll have to contact her insurance company about her oop cost to receive the injection.

## 2021-05-29 NOTE — PROGRESS NOTES

## 2021-05-30 VITALS — WEIGHT: 139 LBS | BODY MASS INDEX: 27.15 KG/M2

## 2021-05-30 NOTE — TELEPHONE ENCOUNTER
Former patient of sheri Avila & has not established care with another provider.  Please assign refill request to covering provider per Clinic standard process.      Refill Approved    Rx renewed per Medication Renewal Policy. Medication was last renewed on 1/14/19.    Deepali Zaragoza, [facility] Connection Triage/Med Refill 7/10/2019     Requested Prescriptions   Pending Prescriptions Disp Refills     omeprazole (PRILOSEC) 20 MG capsule [Pharmacy Med Name: OMEPRAZOLE 20MG CAPSULES] 90 capsule 0     Sig: TAKE 1 CAPSULE(20 MG) BY MOUTH DAILY BEFORE BREAKFAST       GI Medications Refill Protocol Passed - 7/10/2019  3:23 AM        Passed - PCP or prescribing provider visit in last 12 or next 3 months.     Last office visit with prescriber/PCP: 8/15/2018 Sheri Avila MD OR same dept: 4/18/2019 Chuy Avila MD OR same specialty: 4/18/2019 Chuy Avila MD  Last physical: 11/30/2017 Last MTM visit: Visit date not found   Next visit within 3 mo: Visit date not found  Next physical within 3 mo: Visit date not found  Prescriber OR PCP: Sheri Avila MD  Last diagnosis associated with med order: 1. Esophageal reflux  - omeprazole (PRILOSEC) 20 MG capsule [Pharmacy Med Name: OMEPRAZOLE 20MG CAPSULES]; TAKE 1 CAPSULE(20 MG) BY MOUTH DAILY BEFORE BREAKFAST  Dispense: 90 capsule; Refill: 0    If protocol passes may refill for 12 months if within 3 months of last provider visit (or a total of 15 months).

## 2021-05-30 NOTE — TELEPHONE ENCOUNTER
Former patient of jennifer Avila & has not established care with another provider.  Please assign refill request to covering provider per Clinic standard process.     RN cannot approve Refill Request    RN can NOT refill this medication Protocol failed and NO refill given.      Deepali Zaragoza, Care Connection Triage/Med Refill 7/10/2019    Requested Prescriptions   Pending Prescriptions Disp Refills     metFORMIN (GLUCOPHAGE-XR) 500 MG 24 hr tablet [Pharmacy Med Name: METFORMIN ER 500MG 24HR TABS] 180 tablet 0     Sig: TAKE 1 TABLET(500 MG) BY MOUTH TWICE DAILY BEFORE MEALS       Metformin Refill Protocol Failed - 7/10/2019  3:34 AM        Failed - Microalbumin in last year      Microalbumin, Random Urine   Date Value Ref Range Status   03/12/2018 6.20 (H) 0.00 - 1.99 mg/dL Final                  Passed - Blood pressure in last 12 months     BP Readings from Last 1 Encounters:   04/18/19 122/60             Passed - LFT or AST or ALT in last 12 months     Albumin   Date Value Ref Range Status   01/14/2019 3.6 3.5 - 5.0 g/dL Final     Bilirubin, Total   Date Value Ref Range Status   01/14/2019 0.4 0.0 - 1.0 mg/dL Final     Bilirubin, Direct   Date Value Ref Range Status   07/21/2015 0.3 <=0.5 mg/dL Final     Alkaline Phosphatase   Date Value Ref Range Status   01/14/2019 105 45 - 120 U/L Final     AST   Date Value Ref Range Status   01/14/2019 26 0 - 40 U/L Final     ALT   Date Value Ref Range Status   01/14/2019 31 0 - 45 U/L Final     Protein, Total   Date Value Ref Range Status   01/14/2019 7.2 6.0 - 8.0 g/dL Final                Passed - GFR or Serum Creatinine in last 6 months     GFR MDRD Non Af Amer   Date Value Ref Range Status   01/14/2019 48 (L) >60 mL/min/1.73m2 Final     GFR MDRD Af Amer   Date Value Ref Range Status   01/14/2019 58 (L) >60 mL/min/1.73m2 Final             Passed - Visit with PCP or prescribing provider visit in last 6 months or next 3 months     Last office visit with prescriber/PCP:  2/18/2019 OR same dept: 4/18/2019 Chuy Avila MD OR same specialty: 4/18/2019 Chuy Avila MD Last physical: Visit date not found Last MTM visit: Visit date not found         Next appt within 3 mo: Visit date not found  Next physical within 3 mo: Visit date not found  Prescriber OR PCP: Carlos Del Valle CNP  Last diagnosis associated with med order: 1. Type 2 diabetes mellitus (H)  - metFORMIN (GLUCOPHAGE-XR) 500 MG 24 hr tablet [Pharmacy Med Name: METFORMIN ER 500MG 24HR TABS]; TAKE 1 TABLET(500 MG) BY MOUTH TWICE DAILY BEFORE MEALS  Dispense: 180 tablet; Refill: 0     If protocol passes may refill for 12 months if within 3 months of last provider visit (or a total of 15 months).           Passed - A1C in last 6 months     Hemoglobin A1c   Date Value Ref Range Status   01/14/2019 6.8 (H) 3.5 - 6.0 % Final

## 2021-05-30 NOTE — PROGRESS NOTES
ASSESSMENT and PLAN:    #1.  Type 2 diabetes, degree of control be determined.  Check hemoglobin A1c, urine microalbumin, and a lipid profile today.  I will call her with results and further recommendations.  If the results are looking good we will plan on seeing her back in 6 months for an establish care visit along with a diabetic follow-up.    2.  Hypertension, controlled.    3.  Hyperlipidemia, lipids as above.    Problem List Items Addressed This Visit     Type 2 diabetes mellitus (H) - Primary    Relevant Orders    Glycosylated Hemoglobin A1c    Microalbumin, Random Urine    Hypertension    Mixed hyperlipidemia    Relevant Orders    Lipid Cascade FASTING          There are no Patient Instructions on file for this visit.    There are no discontinued medications.    No follow-ups on file.    CHIEF COMPLAINT:  Chief Complaint   Patient presents with     Diabetes     fasting - no conerns        HISTORY OF PRESENT ILLNESS:  Silvia Avila is a 84 y.o. female  presenting to the clinic today for follow-up of her chronic medical conditions.  She is currently on metformin monotherapy for her diabetes.  She is tolerating this well without side effects.  She checks her blood sugars about once per day.  Average blood sugar has been in the 1 15-1 30 range.  No symptomatic hypoglycemia or need for assistance.  She is otherwise feeling well.  Blood pressure is under good control today.    REVIEW OF SYSTEMS:   Pertinent positives noted in HPI, remainder of ROS is negative.    PFSH:      MEDICATIONS:  Current Outpatient Medications   Medication Sig Dispense Refill     ammonium lactate (AMLACTIN) 12 % cream APPLY PRN TO HANDS D  10     aspirin 81 mg chewable tablet Chew 81 mg daily.       atorvastatin (LIPITOR) 40 MG tablet TAKE 1 TABLET(40 MG) BY MOUTH AT BEDTIME 90 tablet 2     CALCITRATE 200 mg (950 mg) tablet TAKE 1 TABLET BY MOUTH TWICE DAILY (Patient taking differently: TAKE 1 TABLET BY MOUTH ONCE DAILY) 200 tablet 3      cholecalciferol, vitamin D3, (VITAMIN D3) 2,000 unit cap Take 1 capsule by mouth daily.       clobetasol (TEMOVATE) 0.05 % cream Apply 1 application topically 2 (two) times a day as needed. Apply daily as needed for eczema       clobetasol (TEMOVATE) 0.05 % external solution Apply to scalp as needed 50 mL 0     docusate sodium (COLACE) 100 MG capsule Take 100 mg by mouth daily.        ferrous gluconate (FERGON) 324 MG tablet TAKE 1 TABLET(324 MG) BY MOUTH DAILY WITH BREAKFAST 90 tablet 0     fluocinonide (LIDEX) 0.05 % external solution Apply 1 application topically daily as needed. Apply to scalp       fluticasone (FLONASE) 50 mcg/actuation nasal spray 2 sprays into each nostril daily. 16 g 12     folic acid (FOLVITE) 1 MG tablet Take 400 mcg by mouth daily.        LACTOBACILLUS ACIDOPHILUS (PROBIOTIC ORAL) Take 1 tablet by mouth daily.        lancets (ONETOUCH DELICA LANCETS) 33 gauge Misc Test twice daily 200 each 2     lisinopril (PRINIVIL,ZESTRIL) 10 MG tablet Take 1 tablet (10 mg total) by mouth daily. 90 tablet 3     metFORMIN (GLUCOPHAGE-XR) 500 MG 24 hr tablet TAKE 1 TABLET(500 MG) BY MOUTH TWICE DAILY BEFORE MEALS 180 tablet 0     nystatin (MYCOSTATIN) cream Perineum as needed 30 g 0     omeprazole (PRILOSEC) 20 MG capsule TAKE 1 CAPSULE(20 MG) BY MOUTH DAILY BEFORE BREAKFAST 90 capsule 0     PARoxetine (PAXIL) 20 MG tablet TAKE 1 TABLET(20 MG) BY MOUTH DAILY 90 tablet 1     Current Facility-Administered Medications   Medication Dose Route Frequency Provider Last Rate Last Dose     denosumab 60 mg (PROLIA 60 mg/ml)  60 mg Subcutaneous Q6 Months Nadia Atkins NP   60 mg at 06/07/19 1301       TOBACCO USE:  Social History     Tobacco Use   Smoking Status Never Smoker   Smokeless Tobacco Never Used       VITALS:  Vitals:    07/19/19 0909   BP: 123/75   Pulse: 72   Weight: 132 lb (59.9 kg)     Wt Readings from Last 3 Encounters:   07/19/19 132 lb (59.9 kg)   04/18/19 135 lb (61.2 kg)   02/18/19 132 lb  (59.9 kg)         PHYSICAL EXAM:  Constitutional:  Reveals an alert, pleasant  female.   Vitals:  Per nursing notes.   Body mass index is 26.21 kg/m .    Neurologic: Normal

## 2021-05-30 NOTE — TELEPHONE ENCOUNTER
Patient has upcoming appointments with Dr. Avila. Jadyn vazquez.  Nasrin Freeman CMA ............... 8:24 AM, 07/10/19

## 2021-05-30 NOTE — TELEPHONE ENCOUNTER
Patient has upcoming appointments with Dr. Avila. Jadyn vazquez.  Nasrin Freeman CMA ............... 8:34 AM, 07/10/19

## 2021-05-31 ENCOUNTER — RECORDS - HEALTHEAST (OUTPATIENT)
Dept: ADMINISTRATIVE | Facility: CLINIC | Age: 86
End: 2021-05-31

## 2021-05-31 VITALS — HEIGHT: 59 IN | BODY MASS INDEX: 27.43 KG/M2 | WEIGHT: 136.06 LBS

## 2021-05-31 VITALS — WEIGHT: 136.06 LBS | BODY MASS INDEX: 27.43 KG/M2 | HEIGHT: 59 IN

## 2021-05-31 VITALS — WEIGHT: 139 LBS | BODY MASS INDEX: 27.29 KG/M2 | HEIGHT: 60 IN

## 2021-05-31 VITALS — HEIGHT: 60 IN | WEIGHT: 137 LBS | BODY MASS INDEX: 26.9 KG/M2

## 2021-05-31 VITALS — BODY MASS INDEX: 27.15 KG/M2 | WEIGHT: 139 LBS

## 2021-05-31 VITALS — WEIGHT: 138.4 LBS | HEIGHT: 60 IN | BODY MASS INDEX: 27.17 KG/M2

## 2021-05-31 VITALS — BODY MASS INDEX: 27.87 KG/M2 | WEIGHT: 138 LBS

## 2021-05-31 NOTE — PROGRESS NOTES
MTM Initial Encounter  Assessment & Plan                                                     1. Type 2 diabetes mellitus   Most recent A1c of 6.8%, well controlled within goal of <8% per ADA guidelines based on age. Experiencing possible side effect to metformin of diarrhea. Already on ER formulation and taking with food. Recommended decreasing dose to 500 mg once daily to see if this improves tolerability. She will continue to monitor blood sugars.   Also discussed her use of aspirin 81 mg daily for primary prevention. She does not have history of heart disease, stroke, or IVD. Most recent studies in elderly patients > 70 years old are finding lack of benefit of low dose aspirin for primary prevention in this population with an increased risk of bleeding. For these reasons, after discussion with the patient, opted to discontinue.   Plan   1. Decrease metformin ER to 500 mg daily with food.   2. Stop aspirin.     2. Hypertension  Blood pressure is well controlled and meeting goal of <140/90 mm Hg per JNC-8 hypertension guidelines.     3. Mixed hyperlipidemia  Appropriately on a statin given age and diabetes diagnosis per ACC/AHA guidelines. Last LDL of 66 mg/dl. Could consider de-escalation of statin intensity as moderate intensity is likely adequate, but she is tolerating so will continue.     4. Gastroesophageal reflux disease  Well controlled with PPI. Failed trial of H2 antagonist.     5. Anxiety  Well controlled on paroxetine. Recommended moving administration to evening as this medication can be sedating and may reduce any daytime fatigue and help with falling asleep.   Plan   1. Move paroxetine 20 mg daily from morning to evening administration.    6. Osteoporosis   Appropriately on active treatment with Prolia along with calcium and vitamin D supplementation. Most recent vitamin D level in goal range.     7. Takes dietary supplements  On folic acid and iron supplement for anemia. Most recent iron studies in  normal range. Last hemoglobin of 10.3.       Follow Up  Return in about 3 weeks (around 9/18/2019).      Subjective & Objective                                                     Silvia Avila is a 84 y.o. female coming in for an initial visit for Medication Therapy Management. She was referred to me from  Part D program.    Chief Complaint: Medication review  Medication Adherence/Access: Good; no issues identified.     1. Type 2 diabetes mellitus   Silvia is taking metformin  mg two times a day with meals. She checks her BG every morning. Usually readings are around 118. No hypoglycemia. She's been experiencing diarrhea. She tried changing her metformin dose from 1000 mg once daily to 500 mg two times a day and that helped for awhile, but now she is having diarrhea again. She takes a probiotic. She's trying to stay away from wheat and gluten to see if that makes a difference.     Lab Results   Component Value Date    HGBA1C 6.8 (H) 07/19/2019    HGBA1C 6.8 (H) 01/14/2019    HGBA1C 7.4 (H) 08/15/2018     Lab Results   Component Value Date    MICROALBUR 4.21 (H) 07/19/2019    LDLCALC 66 07/19/2019    CREATININE 1.10 01/14/2019       2. Hypertension  Silvia is taking lisinopril 10 mg daily. She is aware this is for blood pressure. She denies any symptoms of hypotension. She makes sure to get up from seated to standing slowly.     3. Mixed hyperlipidemia  Silvia is taking atorvastatin 40 mg daily. No noted adverse effects.     4. Gastroesophageal reflux disease  Silvia is taking omeprazole 20 mg every morning. She's been on this for a long time. She's tried Zantac and Pepcid, which were ineffective. She feels the omeprazole works very well for her.     5. Anxiety  Silvia is taking paroxetine 20 mg daily. She takes this in the morning. She finds this effective. She says she is sometimes tired during the day and will take a nap, but feels this is normal for her age.     6. Osteoporosis   Silvia is taking  Prolia injection every 6 months. She is taking calcium citrate 1 tablet and vitamin D3 2,000 IU daily. She has been on Evista in the past.   Cheese, cottage cheese, milk, green leafy vegetables    Last DEXA scan 12/2017:  1. The spine bone density L1-L2 with T-score -1.6, stable compared to 2015.  2. Femoral bone densities show left femoral neck T- score -1.6 and right femoral neck T-score -1.8, stable.  3. Trabecular bone score indicates moderate trabecular bone architecture.  82 y.o. female with LOW BONE DENSITY (OSTEOPENIA) and HIGH fracture risk, adjusted for the TBS, with major osteoporotic fracture risk 15.9% and hip fracture risk 4.4%.     Vitamin D, Total (25-Hydroxy)   Date Value Ref Range Status   10/19/2018 66.9 30.0 - 80.0 ng/mL Final       7. Takes dietary supplements  Silvia is taking folic acid, ferrous gluconate daily for anemia. She also takes a probiotic daily.     Vitamin B-12   Date Value Ref Range Status   03/15/2018 490 213 - 816 pg/mL Final       PMH: reviewed in EPIC   Allergies/ADRs: reviewed in EPIC   Alcohol: yes, occasional glass of wine  Tobacco:   Social History     Tobacco Use   Smoking Status Never Smoker   Smokeless Tobacco Never Used     Today's Vitals:   BP Readings from Last 3 Encounters:   07/19/19 123/75   04/18/19 122/60   02/18/19 114/60     Pulse Readings from Last 3 Encounters:   07/19/19 72   04/18/19 84   02/18/19 76     Wt Readings from Last 3 Encounters:   07/19/19 132 lb (59.9 kg)   04/18/19 135 lb (61.2 kg)   02/18/19 132 lb (59.9 kg)     ----------------    The patient was given a CMS standardized format medication action plan    I spent 60 minutes with this patient today. An extra 15 minutes was spent creating the Medication Action Plan. All changes were made via collaborative practice agreement with Dr. Chuy Avila.. A copy of the visit note was provided to the patient's provider.     Swapna Infante, PharmD, BCACP  Medication Management Pharmacist  [facility]  [facility]        Current Outpatient Medications   Medication Sig Dispense Refill     atorvastatin (LIPITOR) 40 MG tablet TAKE 1 TABLET(40 MG) BY MOUTH AT BEDTIME 90 tablet 2     CALCITRATE 200 mg (950 mg) tablet TAKE 1 TABLET BY MOUTH TWICE DAILY (Patient taking differently: TAKE 1 TABLET BY MOUTH ONCE DAILY) 200 tablet 3     cholecalciferol, vitamin D3, (VITAMIN D3) 2,000 unit cap Take 1 capsule by mouth daily.       clobetasol (TEMOVATE) 0.05 % cream Apply 1 application topically 2 (two) times a day as needed. Apply daily as needed for eczema       clobetasol (TEMOVATE) 0.05 % external solution Apply to scalp as needed 50 mL 0     ferrous gluconate (FERGON) 324 MG tablet TAKE 1 TABLET(324 MG) BY MOUTH DAILY WITH BREAKFAST 90 tablet 0     folic acid (FOLVITE) 1 MG tablet Take 400 mcg by mouth daily.        LACTOBACILLUS ACIDOPHILUS (PROBIOTIC ORAL) Take 1 tablet by mouth daily.        lancets (ONETOUCH DELICA LANCETS) 33 gauge Misc Test twice daily 200 each 2     lisinopril (PRINIVIL,ZESTRIL) 10 MG tablet Take 1 tablet (10 mg total) by mouth daily. 90 tablet 3     metFORMIN (GLUCOPHAGE-XR) 500 MG 24 hr tablet TAKE 1 TABLET(500 MG) BY MOUTH TWICE DAILY BEFORE MEALS 180 tablet 0     nystatin (MYCOSTATIN) cream Perineum as needed 30 g 0     omeprazole (PRILOSEC) 20 MG capsule TAKE 1 CAPSULE(20 MG) BY MOUTH DAILY BEFORE BREAKFAST 90 capsule 0     PARoxetine (PAXIL) 20 MG tablet Take 1 tablet (20 mg total) by mouth daily. 90 tablet 3     Current Facility-Administered Medications   Medication Dose Route Frequency Provider Last Rate Last Dose     denosumab 60 mg (PROLIA 60 mg/ml)  60 mg Subcutaneous Q6 Months Nadia Atkins NP   60 mg at 06/07/19 1301

## 2021-05-31 NOTE — PATIENT INSTRUCTIONS - HE
It was nice to see you today. I value your experience during your clinic visits and would be very thankful for your time with providing feedback if you receive a survey via email. Let me know personally if your experience today was not exceptional so that we can serve you better in the future!     Recommendations from today's Medication Management (MTM) visit:                                                      1. Decrease metformin ER to 1 tablet (500 mg) daily with breakfast.     2. Try taking paroxetine (Paxil) at bedtime/evening.     3. Stop aspirin.     Next MTM appointment:                                                        Wednesday, September 18th at 1:30 PM     To schedule another MTM appointment, please call the clinic directly at [phone] or schedule via [employer].     My MTM pharmacist's contact information:                                                      Please feel free to contact me with any questions or concerns you have.      Swapna Infante, PharmD, [facility]  Medication Management (MTM) Pharmacist  [facility]  [phone]

## 2021-06-01 VITALS — BODY MASS INDEX: 26.52 KG/M2 | WEIGHT: 131.3 LBS

## 2021-06-01 VITALS — BODY MASS INDEX: 27.85 KG/M2 | WEIGHT: 137.9 LBS

## 2021-06-01 VITALS — BODY MASS INDEX: 26.66 KG/M2 | WEIGHT: 132 LBS

## 2021-06-01 VITALS — WEIGHT: 137 LBS | BODY MASS INDEX: 27.67 KG/M2

## 2021-06-01 NOTE — TELEPHONE ENCOUNTER
Called and spoke with patient. Her blood sugars have been high off metformin which caused diarrhea, so Dr. Avila recommended starting pioglitazone 15 mg daily. She has concerns with side effects. Explained possible side effects and relative risk/incidence including fluid retention, fracture risk, and low risk of bladder cancer. She would like to discuss further with Dr. Avila at scheduled visit tomorrow. Other medication options to consider if patient declines pioglitazone include glipizide ER or Tradjenta/Januvia. Will forward note to Dr. Avila as FYI.

## 2021-06-01 NOTE — TELEPHONE ENCOUNTER
Who is calling:  Patient is calling.  Reason for Call:  Patient was prescribed Actos to replace her metformin.  She is concerned about the side affects and wants to talk to Dr. Avila about this.  Date of last appointment with primary care: 09/19/19  Okay to leave a detailed message: Yes

## 2021-06-01 NOTE — TELEPHONE ENCOUNTER
Patient stopped into clinic. She has concerns regarding starting Actos due to the side effects. I did explain to patient that it would be best if she came in to discuss further. Appointment scheduled with Dr. Avila for Tuesday 10/1/19 at 11:00 AM to discuss. Patient has not picked up Actos yet and would rather hold off on medication until seen on Tuesday. Patient states she has not been on diabetes medication for over a week and wants to make sure she will be okay to not have anything over the weekend. Her blood sugar was in the 190's range this morning.    Please call patient back. AAMIR Freeman CMA ............... 5:00 PM, 09/27/19

## 2021-06-01 NOTE — TELEPHONE ENCOUNTER
Silvia Meyer called to tell you her diabetic medications are changing and she would like you to discuss.    Call her on her cell [phone]    Thanks,  BRET Ochoa Coordinator

## 2021-06-01 NOTE — PROGRESS NOTES
ASSESSMENT and PLAN:    #1.  Type 2 diabetes, uncontrolled.  She had some reservations about the Actos I prescribed her, as she read the insert in regards to side effects.  I reassured her that yes could happen I think the benefits much outweigh the risks for her and I would recommend that she start the medication.  She is fine with this and is going to  the prescription tonight.  I will see her back in 6 weeks for reevaluation.    Problem List Items Addressed This Visit     Type 2 diabetes mellitus (H) - Primary          There are no Patient Instructions on file for this visit.    There are no discontinued medications.    No follow-ups on file.    CHIEF COMPLAINT:  Chief Complaint   Patient presents with     Follow-up     Diabetic medication       HISTORY OF PRESENT ILLNESS:  Silvia Avila is a 84 y.o. female  presenting to the clinic today for follow-up of her diabetes.  She had an adverse reaction to her metformin and thus I started her on Actos at 50 mg/day.  When she went to  the prescription she was reading the potential side effects of the drug and I decided to not take this until she talk to me further.  She is well although she has noticed that her blood sugars have been going up into the 190 range.    REVIEW OF SYSTEMS:   Pertinent positives noted in HPI, remainder of ROS is negative.    MEDICATIONS:  Current Outpatient Medications   Medication Sig Dispense Refill     atorvastatin (LIPITOR) 40 MG tablet TAKE 1 TABLET(40 MG) BY MOUTH AT BEDTIME 90 tablet 1     CALCITRATE 200 mg (950 mg) tablet TAKE 1 TABLET BY MOUTH TWICE DAILY (Patient taking differently: TAKE 1 TABLET BY MOUTH ONCE DAILY) 200 tablet 3     cholecalciferol, vitamin D3, (VITAMIN D3) 2,000 unit cap Take 1 capsule by mouth daily.       clobetasol (TEMOVATE) 0.05 % cream Apply 1 application topically 2 (two) times a day as needed. Apply daily as needed for eczema       clobetasol (TEMOVATE) 0.05 % external solution Apply to  scalp as needed 50 mL 0     ferrous gluconate (FERGON) 324 MG tablet TAKE 1 TABLET(324 MG) BY MOUTH DAILY WITH BREAKFAST 90 tablet 0     folic acid (FOLVITE) 1 MG tablet Take 1 mg by mouth daily.             LACTOBACILLUS ACIDOPHILUS (PROBIOTIC ORAL) Take 1 tablet by mouth daily.        lancets (ONETOUCH DELICA LANCETS) 33 gauge Misc Test twice daily 200 each 2     lisinopril (PRINIVIL,ZESTRIL) 10 MG tablet Take 1 tablet (10 mg total) by mouth daily. 90 tablet 3     nystatin (MYCOSTATIN) cream Perineum as needed 30 g 0     omeprazole (PRILOSEC) 20 MG capsule TAKE 1 CAPSULE(20 MG) BY MOUTH DAILY BEFORE BREAKFAST 90 capsule 0     PARoxetine (PAXIL) 20 MG tablet Take 1 tablet (20 mg total) by mouth daily. 90 tablet 3     pioglitazone (ACTOS) 15 MG tablet Take 1 tablet (15 mg total) by mouth daily. 90 tablet 1     Current Facility-Administered Medications   Medication Dose Route Frequency Provider Last Rate Last Dose     denosumab 60 mg (PROLIA 60 mg/ml)  60 mg Subcutaneous Q6 Months Nadia Atkins, NP   60 mg at 06/07/19 1301       TOBACCO USE:  Social History     Tobacco Use   Smoking Status Never Smoker   Smokeless Tobacco Never Used       VITALS:  Vitals:    10/01/19 1413   BP: 120/54   Patient Site: Right Arm   Patient Position: Sitting   Cuff Size: Adult Regular   Pulse: 80   Resp: 12   Weight: 132 lb 6.4 oz (60.1 kg)     Wt Readings from Last 3 Encounters:   10/01/19 132 lb 6.4 oz (60.1 kg)   09/19/19 130 lb 1.6 oz (59 kg)   07/19/19 132 lb (59.9 kg)         PHYSICAL EXAM:  Constitutional:  Reveals an alert, pleasant female.   HEET: Normocephalic, without obvious abnormality, atraumatic.    Neurologic: Normal gait and station  Psychologic: Normal affect

## 2021-06-01 NOTE — TELEPHONE ENCOUNTER
Patient has EC appointment scheduled for 1/23/19 with Dr. Avila. Jadyn vazquez.  Nasrin Freeman CMA ............... 2:48 PM, 09/20/19

## 2021-06-01 NOTE — TELEPHONE ENCOUNTER
Former patient of sheri Avila & has not established care with another provider.  Please assign refill request to covering provider per Clinic standard process.      Refill Approved    Rx renewed per Medication Renewal Policy. Medication was last renewed on 10/4/18.    Deepali Zaragoza, [facility] Connection Triage/Med Refill 9/20/2019     Requested Prescriptions   Pending Prescriptions Disp Refills     atorvastatin (LIPITOR) 40 MG tablet [Pharmacy Med Name: ATORVASTATIN 40MG TABLETS] 90 tablet 0     Sig: TAKE 1 TABLET(40 MG) BY MOUTH AT BEDTIME       Statins Refill Protocol (Hmg CoA Reductase Inhibitors) Passed - 9/20/2019  3:23 AM        Passed - PCP or prescribing provider visit in past 12 months      Last office visit with prescriber/PCP: 8/15/2018 Sheri Avila MD OR same dept: 9/19/2019 Chuy Avila MD OR same specialty: 9/19/2019 Chuy Avila MD  Last physical: 11/30/2017 Last MTM visit: Visit date not found   Next visit within 3 mo: Visit date not found  Next physical within 3 mo: Visit date not found  Prescriber OR PCP: Sheri Avila MD  Last diagnosis associated with med order: 1. Hyperlipidemia  - atorvastatin (LIPITOR) 40 MG tablet [Pharmacy Med Name: ATORVASTATIN 40MG TABLETS]; TAKE 1 TABLET(40 MG) BY MOUTH AT BEDTIME  Dispense: 90 tablet; Refill: 0    If protocol passes may refill for 12 months if within 3 months of last provider visit (or a total of 15 months).

## 2021-06-01 NOTE — PROGRESS NOTES
MTM Follow up Encounter  Assessment & Plan                                                     1. Type 2 diabetes mellitus   Most recent A1c of 6.8%, well controlled within goal of <8% per ADA guidelines based on age.  Diarrhea has improved with reducing metformin dose along with dietary changes.  Blood sugars remain adequately controlled.    2. Hypertension  Blood pressure is well controlled and meeting goal of <140/90 mm Hg per JNC-8 hypertension guidelines.     3. Mixed hyperlipidemia  Appropriately on a statin given age and diabetes diagnosis per ACC/AHA guidelines. Last LDL of 66 mg/dl. Could consider de-escalation of statin intensity as moderate intensity is likely adequate, but she is tolerating so will continue.     4. Gastroesophageal reflux disease  Well controlled with PPI. Failed trial of H2 antagonist.     5. Anxiety  Well controlled on paroxetine.     6. Osteoporosis   Appropriately on active treatment with Prolia along with calcium and vitamin D supplementation. Most recent vitamin D level in goal range.     7. Takes dietary supplements  On folic acid and iron supplement for anemia. Most recent iron studies in normal range. Last hemoglobin of 10.3.       Follow Up  Return in about 2 months (around 11/11/2019).      Subjective & Objective                                                     Silvia Avila is a 84 y.o. female coming in for a follow up visit for Medication Therapy Management. She was referred to me from [facility].    Chief Complaint: Medication changes follow up  Medication Adherence/Access: Good; no issues identified.     1. Type 2 diabetes mellitus   Silvia is taking metformin  mg once a day with meals. She checks her BG every morning. Readings have been 153, 156, 145, 132, 135, 133. No hypoglycemia. Diarrhea has improved She takes a probiotic. She's trying to stay away from wheat and gluten which has helped as well.     Lab Results   Component Value Date    HGBA1C 6.8  (H) 07/19/2019    HGBA1C 6.8 (H) 01/14/2019    HGBA1C 7.4 (H) 08/15/2018     Lab Results   Component Value Date    MICROALBUR 4.21 (H) 07/19/2019    LDLCALC 66 07/19/2019    CREATININE 1.10 01/14/2019       2. Hypertension  Silvia is taking lisinopril 10 mg daily. She is aware this is for blood pressure. She denies any symptoms of hypotension. She makes sure to get up from seated to standing slowly.     3. Mixed hyperlipidemia  Silvia is taking atorvastatin 40 mg daily. No noted adverse effects.     4. Gastroesophageal reflux disease  Silvia is taking omeprazole 20 mg every morning. She's been on this for a long time. She's tried Zantac and Pepcid, which were ineffective. She feels the omeprazole works very well for her.     5. Anxiety  Silvia is taking paroxetine 20 mg every evening.  She finds this effective for her generalized anxiety.     6. Osteoporosis   Silvia is taking Prolia injection every 6 months. She is taking calcium citrate 1 tablet and vitamin D3 2,000 IU daily. She has been on Evista in the past. She eats cheese, cottage cheese, milk, green leafy vegetables in her diet regularly.     Last DEXA scan 12/2017:  1. The spine bone density L1-L2 with T-score -1.6, stable compared to 2015.  2. Femoral bone densities show left femoral neck T- score -1.6 and right femoral neck T-score -1.8, stable.  3. Trabecular bone score indicates moderate trabecular bone architecture.  82 y.o. female with LOW BONE DENSITY (OSTEOPENIA) and HIGH fracture risk, adjusted for the TBS, with major osteoporotic fracture risk 15.9% and hip fracture risk 4.4%.     Vitamin D, Total (25-Hydroxy)   Date Value Ref Range Status   10/19/2018 66.9 30.0 - 80.0 ng/mL Final       7. Takes dietary supplements  Silvia is taking folic acid, ferrous gluconate daily for anemia. She also takes a probiotic daily.     Vitamin B-12   Date Value Ref Range Status   03/15/2018 490 213 - 816 pg/mL Final       PMH: reviewed in EPIC   Allergies/ADRs:  reviewed in EPIC   Alcohol: yes, occasional glass of wine  Tobacco:   Social History     Tobacco Use   Smoking Status Never Smoker   Smokeless Tobacco Never Used     Today's Vitals:   BP Readings from Last 3 Encounters:   09/11/19 110/68   07/19/19 123/75   04/18/19 122/60     Pulse Readings from Last 3 Encounters:   09/11/19 80   07/19/19 72   04/18/19 84     Wt Readings from Last 3 Encounters:   07/19/19 132 lb (59.9 kg)   04/18/19 135 lb (61.2 kg)   02/18/19 132 lb (59.9 kg)     ----------------    The patient was given a summary of these recommendations via [employer]    I spent 30 minutes with this patient today.   All changes were made via collaborative practice agreement with Dr. Chuy Avila.. A copy of the visit note was provided to the patient's provider.     Swapna Infante, PharmD, BCACP  Medication Management Pharmacist  [facility]        Current Outpatient Medications   Medication Sig Dispense Refill     atorvastatin (LIPITOR) 40 MG tablet TAKE 1 TABLET(40 MG) BY MOUTH AT BEDTIME 90 tablet 2     CALCITRATE 200 mg (950 mg) tablet TAKE 1 TABLET BY MOUTH TWICE DAILY (Patient taking differently: TAKE 1 TABLET BY MOUTH ONCE DAILY) 200 tablet 3     cholecalciferol, vitamin D3, (VITAMIN D3) 2,000 unit cap Take 1 capsule by mouth daily.       clobetasol (TEMOVATE) 0.05 % cream Apply 1 application topically 2 (two) times a day as needed. Apply daily as needed for eczema       clobetasol (TEMOVATE) 0.05 % external solution Apply to scalp as needed 50 mL 0     ferrous gluconate (FERGON) 324 MG tablet TAKE 1 TABLET(324 MG) BY MOUTH DAILY WITH BREAKFAST 90 tablet 0     folic acid (FOLVITE) 1 MG tablet Take 1 mg by mouth daily.             LACTOBACILLUS ACIDOPHILUS (PROBIOTIC ORAL) Take 1 tablet by mouth daily.        lancets (ONETOUCH DELICA LANCETS) 33 gauge Misc Test twice daily 200 each 2     lisinopril (PRINIVIL,ZESTRIL) 10 MG tablet Take 1 tablet (10 mg total) by mouth daily. 90 tablet  3     metFORMIN (GLUCOPHAGE-XR) 500 MG 24 hr tablet Take 1 tablet (500 mg total) by mouth daily with breakfast. 180 tablet 0     nystatin (MYCOSTATIN) cream Perineum as needed 30 g 0     omeprazole (PRILOSEC) 20 MG capsule TAKE 1 CAPSULE(20 MG) BY MOUTH DAILY BEFORE BREAKFAST 90 capsule 0     PARoxetine (PAXIL) 20 MG tablet Take 1 tablet (20 mg total) by mouth daily. 90 tablet 3     Current Facility-Administered Medications   Medication Dose Route Frequency Provider Last Rate Last Dose     denosumab 60 mg (PROLIA 60 mg/ml)  60 mg Subcutaneous Q6 Months Nadia Atkins NP   60 mg at 06/07/19 1301

## 2021-06-01 NOTE — PROGRESS NOTES
ASSESSMENT and PLAN:    #1.  Type 2 diabetes, under good control but patient is having loose stool secondary to metformin.  I am going to stop her metformin entirely and she is going to contact me in 7 to 10 days to let me know how she is doing.  If her diarrhea resolves we will need to switch her over to a different medication such as Actos.  If her diarrhea does not improve we will need to work this up further.  I will await her response.    2.  Hypertension, controlled.    3.  Hyperlipidemia, controlled.  Problem List Items Addressed This Visit     Type 2 diabetes mellitus (H) - Primary    Mixed hyperlipidemia    Hypertension          There are no Patient Instructions on file for this visit.    Medications Discontinued During This Encounter   Medication Reason     metFORMIN (GLUCOPHAGE-XR) 500 MG 24 hr tablet        No follow-ups on file.    CHIEF COMPLAINT:  Chief Complaint   Patient presents with     Diarrhea     thinks its from the metformin alot of does changes       HISTORY OF PRESENT ILLNESS:  Silvia Avila is a 84 y.o. female  presenting to the clinic today for follow-up of her diabetes along with some diarrhea that she has been having.  She has been on metformin and has been tolerating this well up until the last few months.  She states that when she will have some very loose stools along with some slight urgency to go.  She has seen Swapna Morales as well and she has been decreasing Silvia's metformin dose.  She states that this helps the diarrhea for a bit but then it comes back.  She denies any abdominal pain, blood in her stool, or fevers.  She is otherwise feeling well today.  Pressure is well controlled.  REVIEW OF SYSTEMS:   Pertinent positives noted in HPI, remainder of ROS is negative.    MEDICATIONS:  Current Outpatient Medications   Medication Sig Dispense Refill     atorvastatin (LIPITOR) 40 MG tablet TAKE 1 TABLET(40 MG) BY MOUTH AT BEDTIME 90 tablet 2     CALCITRATE 200 mg (950 mg) tablet  TAKE 1 TABLET BY MOUTH TWICE DAILY (Patient taking differently: TAKE 1 TABLET BY MOUTH ONCE DAILY) 200 tablet 3     cholecalciferol, vitamin D3, (VITAMIN D3) 2,000 unit cap Take 1 capsule by mouth daily.       clobetasol (TEMOVATE) 0.05 % cream Apply 1 application topically 2 (two) times a day as needed. Apply daily as needed for eczema       clobetasol (TEMOVATE) 0.05 % external solution Apply to scalp as needed 50 mL 0     ferrous gluconate (FERGON) 324 MG tablet TAKE 1 TABLET(324 MG) BY MOUTH DAILY WITH BREAKFAST 90 tablet 0     folic acid (FOLVITE) 1 MG tablet Take 1 mg by mouth daily.             LACTOBACILLUS ACIDOPHILUS (PROBIOTIC ORAL) Take 1 tablet by mouth daily.        lancets (ONETOUCH DELICA LANCETS) 33 gauge Misc Test twice daily 200 each 2     lisinopril (PRINIVIL,ZESTRIL) 10 MG tablet Take 1 tablet (10 mg total) by mouth daily. 90 tablet 3     nystatin (MYCOSTATIN) cream Perineum as needed 30 g 0     omeprazole (PRILOSEC) 20 MG capsule TAKE 1 CAPSULE(20 MG) BY MOUTH DAILY BEFORE BREAKFAST 90 capsule 0     PARoxetine (PAXIL) 20 MG tablet Take 1 tablet (20 mg total) by mouth daily. 90 tablet 3     Current Facility-Administered Medications   Medication Dose Route Frequency Provider Last Rate Last Dose     denosumab 60 mg (PROLIA 60 mg/ml)  60 mg Subcutaneous Q6 Months Nadia Atkins NP   60 mg at 06/07/19 1301       TOBACCO USE:  Social History     Tobacco Use   Smoking Status Never Smoker   Smokeless Tobacco Never Used       VITALS:  Vitals:    09/19/19 0903   BP: 108/54   Pulse: 92   Temp: 97.6  F (36.4  C)   Weight: 130 lb 1.6 oz (59 kg)     Wt Readings from Last 3 Encounters:   09/19/19 130 lb 1.6 oz (59 kg)   07/19/19 132 lb (59.9 kg)   04/18/19 135 lb (61.2 kg)         PHYSICAL EXAM:  Constitutional:  Reveals an alert, pleasant female.   HEET: Normocephalic, without obvious abnormality, atraumatic.   Neurologic: Normal gait and station  Psychologic: Normal affect

## 2021-06-01 NOTE — TELEPHONE ENCOUNTER
RN cannot approve Refill Request    RN can NOT refill this medication PCP messaged that patient is overdue for Office Visit. Last office visit: Visit date not found Last Physical: Visit date not found Last MTM visit: Visit date not found Last visit same specialty: 10/20/2016 Catherine Jeronimo FNP.  Next visit within 3 mo: Visit date not found  Next physical within 3 mo: Visit date not found      Geena Moreno, [facility] Triage/Med Refill 9/19/2019    Requested Prescriptions   Pending Prescriptions Disp Refills     lancets (ONETOUCH DELICA LANCETS) 33 gauge Misc 200 each 2     Sig: Test twice daily       Diabetic Supplies Refill Protocol Failed - 9/19/2019  3:13 PM        Failed - Visit with PCP or prescribing provider visit in last 6 months     Last office visit with prescriber/PCP: Visit date not found OR same dept: Visit date not found OR same specialty: 10/20/2016 Catherine Jeronimo, [facility]  Last physical: Visit date not found Last MTM visit: Visit date not found   Next visit within 3 mo: Visit date not found  Next physical within 3 mo: Visit date not found  Prescriber OR PCP: No Primary Care Provider  Last diagnosis associated with med order: 1. Diabetes mellitus, type 2 (H)  - lancets (ONETOUCH DELICA LANCETS) 33 gauge Misc; Test twice daily  Dispense: 200 each; Refill: 2    If protocol passes may refill for 12 months if within 3 months of last provider visit (or a total of 15 months).             Passed - A1C in last 6 months     Hemoglobin A1c   Date Value Ref Range Status   07/19/2019 6.8 (H) 3.5 - 6.0 % Final

## 2021-06-01 NOTE — TELEPHONE ENCOUNTER
Patient notified of clinician's message and verbalized understanding. No further questions at this time.   Nasrin Freeman CMA ............... 11:13 AM, 09/26/19

## 2021-06-01 NOTE — TELEPHONE ENCOUNTER
Spoke with Dr. Avila and he is okay with patient holding off on medication until Tuesday to discuss. Patient notified.  Nasrin Freeman CMA ............... 5:05 PM, 09/27/19

## 2021-06-01 NOTE — TELEPHONE ENCOUNTER
Question following Office Visit  When did you see your provider: 9/19/19  What is your question: Patient stated her diarrhea symptoms have ceased. Patient reported her blood sugars were 185, 213, and 200. Patient stated she was told to report this to Dr. Chuy Avila and he would make the decision to either put her back on metformin or change her medication.  Okay to leave a detailed message: Yes  Home or mobile

## 2021-06-01 NOTE — TELEPHONE ENCOUNTER
FYI - Status Update  Who is Calling: Patient  Update: The patient is requesting an expedited refill due to being nearly out of the lancets. The patient states that she told a nurse that she needed a refill and it did not happen therefore she thought she had refills.   Okay to leave a detailed message?:  Yes

## 2021-06-02 VITALS — WEIGHT: 135 LBS | BODY MASS INDEX: 26.81 KG/M2

## 2021-06-02 VITALS — WEIGHT: 132 LBS | BODY MASS INDEX: 26.21 KG/M2

## 2021-06-02 VITALS — WEIGHT: 135.6 LBS | BODY MASS INDEX: 26.93 KG/M2

## 2021-06-02 VITALS — BODY MASS INDEX: 26.74 KG/M2 | HEIGHT: 60 IN | WEIGHT: 136.2 LBS

## 2021-06-02 VITALS — BODY MASS INDEX: 26.1 KG/M2 | WEIGHT: 131.4 LBS

## 2021-06-02 NOTE — TELEPHONE ENCOUNTER
Please call patient to reschedule upcoming appt.  Patient will be due for DXA scan 12/12/19, so once she schedules that, she should call us back to reschedule appt with Lashonda 3 weeks after so we have DXA result to discuss at visit.  We also need labs a week prior to visit.

## 2021-06-02 NOTE — TELEPHONE ENCOUNTER
She should continue the Actos, as she has only been on it for a week.  Call back in 2-3 weeks if not improved

## 2021-06-02 NOTE — TELEPHONE ENCOUNTER
Her dexa is: ????  Date: 11/4/2019 Status: Delvin   Time: 11:00 AM       She has labs:  Date: 11/6/2019 Status: Delvin   Time: 9:45 AM Length: 15   Visit Type: LAB [[id number]] Copay: $0.00   Provider: [facility]     Can probably ask her to do labs with madeleine that day as well.     appt with madeleine:  Date: 12/6/2019 Status: Delvin   Time: 9:40 AM Length: 20   Visit Type: OFFICE VISIT [[id number]] Copay: $0.00   Provider: Nadia Atkins NP Department: [facility]   Referring Provider: ESTEBAN DAHL CSN: [id number]   Notes: osteo follow-up

## 2021-06-02 NOTE — TELEPHONE ENCOUNTER
Medication Question or Clarification  Who is calling: Patient  What medication are you calling about? (include dose and sig)    Disp Refills Start End    pioglitazone (ACTOS) 15 MG tablet 90 tablet 1 9/25/2019     Sig - Route: Take 1 tablet (15 mg total) by mouth daily. - Oral    Sent to pharmacy as: pioglitazone 15 mg tablet (ACTOS)    E-Prescribing Status: Receipt confirmed by pharmacy (9/25/2019  5:53 PM CDT)      Who prescribed the medication?:   What is your question/concern?: Patient stated that her blood sugar has been up in the 160 and 170. Patient is questioning if Dr. Avila would want to do any adjustments on this medication.  Pharmacy: [id number]  Okay to leave a detailed message?: Yes, [phone]  Site CMT - Please call the pharmacy to obtain any additional needed information.

## 2021-06-02 NOTE — TELEPHONE ENCOUNTER
Patient has upcoming appointments with Dr. Avila. Jadyn vazquez.  Nasrin Freeman CMA ............... 7:50 AM, 10/07/19

## 2021-06-02 NOTE — TELEPHONE ENCOUNTER
Former patient of [place] & has not established care with another provider.  Please assign refill request to covering provider per Clinic standard process.      RN cannot approve Refill Request    RN can NOT refill this medication medication not on med list.     Metformin not on med list      Deepali Zaragoza, [facility] Connection Triage/Med Refill 10/7/2019    Requested Prescriptions   Pending Prescriptions Disp Refills     omeprazole (PRILOSEC) 20 MG capsule [Pharmacy Med Name: OMEPRAZOLE 20MG CAPSULES] 90 capsule 0     Sig: TAKE 1 CAPSULE(20 MG) BY MOUTH DAILY BEFORE BREAKFAST       GI Medications Refill Protocol Passed - 10/5/2019  3:25 AM        Passed - PCP or prescribing provider visit in last 12 or next 3 months.     Last office visit with prescriber/PCP: 2/18/2019 Carlos Del Valle CNP OR same dept: 10/1/2019 Chuy Avila MD OR same specialty: 10/1/2019 Chuy Avila MD  Last physical: Visit date not found Last MTM visit: Visit date not found   Next visit within 3 mo: Visit date not found  Next physical within 3 mo: Visit date not found  Prescriber OR PCP: Carlos Del Valle CNP  Last diagnosis associated with med order: 1. Esophageal reflux  - omeprazole (PRILOSEC) 20 MG capsule [Pharmacy Med Name: OMEPRAZOLE 20MG CAPSULES]; TAKE 1 CAPSULE(20 MG) BY MOUTH DAILY BEFORE BREAKFAST  Dispense: 90 capsule; Refill: 0    2. Type 2 diabetes mellitus (H)  - metFORMIN (GLUCOPHAGE-XR) 500 MG 24 hr tablet [Pharmacy Med Name: METFORMIN ER 500MG 24HR TABS]; TAKE 1 TABLET(500 MG) BY MOUTH TWICE DAILY BEFORE MEALS  Dispense: 180 tablet; Refill: 0    If protocol passes may refill for 12 months if within 3 months of last provider visit (or a total of 15 months).             metFORMIN (GLUCOPHAGE-XR) 500 MG 24 hr tablet [Pharmacy Med Name: METFORMIN ER 500MG 24HR TABS] 180 tablet 0     Sig: TAKE 1 TABLET(500 MG) BY MOUTH TWICE DAILY BEFORE MEALS       Metformin Refill Protocol Passed - 10/5/2019  3:25 AM        Passed  - Blood pressure in last 12 months     BP Readings from Last 1 Encounters:   10/01/19 120/54             Passed - LFT or AST or ALT in last 12 months     Albumin   Date Value Ref Range Status   01/14/2019 3.6 3.5 - 5.0 g/dL Final     Bilirubin, Total   Date Value Ref Range Status   01/14/2019 0.4 0.0 - 1.0 mg/dL Final     Bilirubin, Direct   Date Value Ref Range Status   07/21/2015 0.3 <=0.5 mg/dL Final     Alkaline Phosphatase   Date Value Ref Range Status   01/14/2019 105 45 - 120 U/L Final     AST   Date Value Ref Range Status   01/14/2019 26 0 - 40 U/L Final     ALT   Date Value Ref Range Status   01/14/2019 31 0 - 45 U/L Final     Protein, Total   Date Value Ref Range Status   01/14/2019 7.2 6.0 - 8.0 g/dL Final                Passed - GFR or Serum Creatinine in last 6 months     GFR MDRD Non Af Amer   Date Value Ref Range Status   01/14/2019 48 (L) >60 mL/min/1.73m2 Final     GFR MDRD Af Amer   Date Value Ref Range Status   01/14/2019 58 (L) >60 mL/min/1.73m2 Final             Passed - Visit with PCP or prescribing provider visit in last 6 months or next 3 months     Last office visit with prescriber/PCP: Visit date not found OR same dept: 10/1/2019 Chuy Avila MD OR same specialty: 10/1/2019 Chuy Avila MD Last physical: Visit date not found Last MTM visit: Visit date not found         Next appt within 3 mo: Visit date not found  Next physical within 3 mo: Visit date not found  Prescriber OR PCP: Carlos Del Valle CNP  Last diagnosis associated with med order: 1. Esophageal reflux  - omeprazole (PRILOSEC) 20 MG capsule [Pharmacy Med Name: OMEPRAZOLE 20MG CAPSULES]; TAKE 1 CAPSULE(20 MG) BY MOUTH DAILY BEFORE BREAKFAST  Dispense: 90 capsule; Refill: 0    2. Type 2 diabetes mellitus (H)  - metFORMIN (GLUCOPHAGE-XR) 500 MG 24 hr tablet [Pharmacy Med Name: METFORMIN ER 500MG 24HR TABS]; TAKE 1 TABLET(500 MG) BY MOUTH TWICE DAILY BEFORE MEALS  Dispense: 180 tablet; Refill: 0     If protocol  passes may refill for 12 months if within 3 months of last provider visit (or a total of 15 months).           Passed - A1C in last 6 months     Hemoglobin A1c   Date Value Ref Range Status   07/19/2019 6.8 (H) 3.5 - 6.0 % Final               Passed - Microalbumin in last year      Microalbumin, Random Urine   Date Value Ref Range Status   07/19/2019 4.21 (H) 0.00 - 1.99 mg/dL Final

## 2021-06-02 NOTE — TELEPHONE ENCOUNTER
Who is calling:  patient  Reason for Call:  Calling to report her blood sugars have been 180 in the mornings for 1 week. Per patient this is the only time of the day she checks her blood sugars. Patient denies any symptoms and reports she feels fine. Patient thinks that number should be lower. Please advise!  Date of last appointment with primary care: 10/1/2019  Okay to leave a detailed message: Yes

## 2021-06-02 NOTE — TELEPHONE ENCOUNTER
I think we're good at those levels.  In someone of her age, I'm fine with those average blood sugars.  Anything below 180 would be fine

## 2021-06-03 VITALS — WEIGHT: 132 LBS | BODY MASS INDEX: 26.21 KG/M2

## 2021-06-03 VITALS
HEART RATE: 80 BPM | DIASTOLIC BLOOD PRESSURE: 54 MMHG | WEIGHT: 132.4 LBS | SYSTOLIC BLOOD PRESSURE: 120 MMHG | RESPIRATION RATE: 12 BRPM | BODY MASS INDEX: 26.29 KG/M2

## 2021-06-03 VITALS
HEART RATE: 92 BPM | TEMPERATURE: 97.6 F | DIASTOLIC BLOOD PRESSURE: 54 MMHG | WEIGHT: 130.1 LBS | BODY MASS INDEX: 25.84 KG/M2 | SYSTOLIC BLOOD PRESSURE: 108 MMHG

## 2021-06-03 VITALS
DIASTOLIC BLOOD PRESSURE: 71 MMHG | BODY MASS INDEX: 26.81 KG/M2 | SYSTOLIC BLOOD PRESSURE: 126 MMHG | WEIGHT: 135 LBS | HEART RATE: 56 BPM

## 2021-06-03 NOTE — PROGRESS NOTES
ASSESSMENT and PLAN:    #1.  Type 2 diabetes, under control.  She will continue her Actos and follow-up in 6 months.    2.  Hyperlipidemia, stable.    3.  Hypertension, controlled.    Problem List Items Addressed This Visit     Type 2 diabetes mellitus (H) - Primary    Mixed hyperlipidemia    Hypertension          There are no Patient Instructions on file for this visit.    Medications Discontinued During This Encounter   Medication Reason     clobetasol (TEMOVATE) 0.05 % external solution        No follow-ups on file.    CHIEF COMPLAINT:  Chief Complaint   Patient presents with     Follow-up     a1c, kidney function worsening, saw LANIE Infante       HISTORY OF PRESENT ILLNESS:  Silvia Avila is a 84 y.o. female  presenting to the clinic today for follow-up of her chronic medical conditions.  She is currently on Actos monotherapy as metformin because some significant diarrhea.  She is tolerating this well.  Her last hemoglobin A1c was 7.6 in early November.  She is going to see us back in 6 months.  Blood pressure is under excellent control on lisinopril.  Lipids are at goal on 40 mg of Lipitor.  She had these last checked in July of this year.  She is otherwise feeling well today.    REVIEW OF SYSTEMS:   Pertinent positives noted in HPI, remainder of ROS is negative.    MEDICATIONS:  Current Outpatient Medications   Medication Sig Dispense Refill     atorvastatin (LIPITOR) 40 MG tablet TAKE 1 TABLET(40 MG) BY MOUTH AT BEDTIME 90 tablet 1     CALCITRATE 200 mg (950 mg) tablet TAKE 1 TABLET BY MOUTH TWICE DAILY (Patient taking differently: TAKE 1 TABLET BY MOUTH ONCE DAILY) 200 tablet 3     cholecalciferol, vitamin D3, (VITAMIN D3) 2,000 unit cap Take 1 capsule by mouth daily.       clobetasol (TEMOVATE) 0.05 % cream Apply 1 application topically 2 (two) times a day as needed. Apply daily as needed for eczema       ferrous gluconate (FERGON) 324 MG tablet TAKE 1 TABLET(324 MG) BY MOUTH DAILY WITH BREAKFAST 90 tablet 0      folic acid (FOLVITE) 1 MG tablet Take 1 mg by mouth daily.             LACTOBACILLUS ACIDOPHILUS (PROBIOTIC ORAL) Take 1 tablet by mouth daily.        lancets (ONETOUCH DELICA LANCETS) 33 gauge Misc Test twice daily 200 each 2     lisinopril (PRINIVIL,ZESTRIL) 10 MG tablet Take 1 tablet (10 mg total) by mouth daily. 90 tablet 3     nystatin (MYCOSTATIN) cream Perineum as needed 30 g 0     omeprazole (PRILOSEC) 20 MG capsule TAKE 1 CAPSULE(20 MG) BY MOUTH DAILY BEFORE BREAKFAST 90 capsule 0     PARoxetine (PAXIL) 20 MG tablet Take 1 tablet (20 mg total) by mouth daily. 90 tablet 3     pioglitazone (ACTOS) 15 MG tablet Take 1 tablet (15 mg total) by mouth daily. 90 tablet 1     Current Facility-Administered Medications   Medication Dose Route Frequency Provider Last Rate Last Dose     denosumab 60 mg (PROLIA 60 mg/ml)  60 mg Subcutaneous Q6 Months Nadia Atkins, NP   60 mg at 06/07/19 1301       TOBACCO USE:  Social History     Tobacco Use   Smoking Status Never Smoker   Smokeless Tobacco Never Used       VITALS:  Vitals:    11/19/19 1017   BP: 112/60   Pulse: 80   Weight: 138 lb 3.2 oz (62.7 kg)     Wt Readings from Last 3 Encounters:   11/19/19 138 lb 3.2 oz (62.7 kg)   11/06/19 135 lb (61.2 kg)   10/01/19 132 lb 6.4 oz (60.1 kg)         PHYSICAL EXAM:  Constitutional:  Reveals an alert, pleasant female.   HEET: Normocephalic, without obvious abnormality, atraumatic. PERRL, conjunctiva/corneas clear, EOM's intact. External canals, TMs clear.   Neurologic: Normal gait and station  Psychologic: Normal affect

## 2021-06-03 NOTE — PROGRESS NOTES
MTM Follow up Encounter  Assessment & Plan                                                     1. Type 2 diabetes mellitus   A1c today of 7.6%, within goal of <8% per ADA guidelines based on age.  Diarrhea has improved after stopping metformin.  Blood sugars have improved with addition of low dose pioglitazone. Explained to patient that full effect of pioglitazone can take up to 8 weeks.   Plan   1. Check A1c, BMP.     2. Hypertension  Blood pressure is well controlled and meeting goal of <140/90 mm Hg per JNC-8 hypertension guidelines.     3. Mixed hyperlipidemia  Appropriately on a statin given age and diabetes diagnosis per ACC/AHA guidelines. Last LDL of 66 mg/dl. Could consider de-escalation of statin intensity as moderate intensity is likely adequate, but she is tolerating so will continue.     4. Gastroesophageal reflux disease  Well controlled with PPI. Failed trial of H2 antagonist.     5. Anxiety  Well controlled on paroxetine.     6. Osteoporosis   Appropriately on active treatment with Prolia along with calcium and vitamin D supplementation. Most recent vitamin D level in goal range.     Follow Up  Return in about 6 months (around 5/6/2020).      Subjective & Objective                                                     Silvia Avila is a 84 y.o. female coming in for a follow up visit for Medication Therapy Management. She was referred to me from [facility] program.    Chief Complaint: Medication changes follow up - A1c  Medication Adherence/Access: Good; no issues identified.     1. Type 2 diabetes mellitus   Silvia is taking pioglitazone 15 mg daily which was added by PCP last month. She is no longer on metformin as even low dose caused diarrhea. Checks her BG every morning before eating. Readings have been 129, 177, 140, 166, 122, 140, 150, 149, 170, 156, 161, 127, 145, 152, 132, 149, 127. Diarrhea has resolved. She takes a probiotic. She's trying to stay away from wheat and gluten which has helped  as well.     Lab Results   Component Value Date    HGBA1C 7.6 (H) 11/06/2019    HGBA1C 6.8 (H) 07/19/2019    HGBA1C 6.8 (H) 01/14/2019     Lab Results   Component Value Date    MICROALBUR 4.21 (H) 07/19/2019    LDLCALC 66 07/19/2019    CREATININE 1.10 01/14/2019       2. Hypertension  Silvia is taking lisinopril 10 mg daily. She is aware this is for blood pressure. She denies any symptoms of hypotension. She makes sure to get up from seated to standing slowly.     3. Mixed hyperlipidemia  Silvia is taking atorvastatin 40 mg daily. No noted adverse effects.     4. Gastroesophageal reflux disease  Silvia is taking omeprazole 20 mg every morning. She's been on this for a long time. She's tried Zantac and Pepcid, which were ineffective. She feels the omeprazole works very well for her.     5. Anxiety  Silvia is taking paroxetine 20 mg every evening.  She finds this effective for her generalized anxiety.     6. Osteoporosis   Silvia is taking Prolia injection every 6 months. She is taking calcium citrate 1 tablet and vitamin D3 2,000 IU daily. She has been on Evista in the past. She eats cheese, cottage cheese, milk, green leafy vegetables in her diet regularly.     Last DEXA scan 12/2017:  1. The spine bone density L1-L2 with T-score -1.6, stable compared to 2015.  2. Femoral bone densities show left femoral neck T- score -1.6 and right femoral neck T-score -1.8, stable.  3. Trabecular bone score indicates moderate trabecular bone architecture.  82 y.o. female with LOW BONE DENSITY (OSTEOPENIA) and HIGH fracture risk, adjusted for the TBS, with major osteoporotic fracture risk 15.9% and hip fracture risk 4.4%.     Vitamin D, Total (25-Hydroxy)   Date Value Ref Range Status   10/19/2018 66.9 30.0 - 80.0 ng/mL Final       PMH: reviewed in EPIC   Allergies/ADRs: reviewed in EPIC   Alcohol: yes, occasional glass of wine  Tobacco:   Social History     Tobacco Use   Smoking Status Never Smoker   Smokeless Tobacco Never Used      Today's Vitals:   BP Readings from Last 3 Encounters:   11/06/19 126/71   10/01/19 120/54   09/19/19 108/54     Pulse Readings from Last 3 Encounters:   11/06/19 (!) 56   10/01/19 80   09/19/19 92     Wt Readings from Last 3 Encounters:   11/06/19 135 lb (61.2 kg)   10/01/19 132 lb 6.4 oz (60.1 kg)   09/19/19 130 lb 1.6 oz (59 kg)     ----------------    The patient was given a summary of these recommendations via [employer]    I spent 30 minutes with this patient today.   All changes were made via collaborative practice agreement with Dr. Chuy Avila.. A copy of the visit note was provided to the patient's provider.     Swapna Infante, PharmD, BCACP  Medication Management Pharmacist  [facility]        Current Outpatient Medications   Medication Sig Dispense Refill     atorvastatin (LIPITOR) 40 MG tablet TAKE 1 TABLET(40 MG) BY MOUTH AT BEDTIME 90 tablet 1     CALCITRATE 200 mg (950 mg) tablet TAKE 1 TABLET BY MOUTH TWICE DAILY (Patient taking differently: TAKE 1 TABLET BY MOUTH ONCE DAILY) 200 tablet 3     cholecalciferol, vitamin D3, (VITAMIN D3) 2,000 unit cap Take 1 capsule by mouth daily.       clobetasol (TEMOVATE) 0.05 % cream Apply 1 application topically 2 (two) times a day as needed. Apply daily as needed for eczema       clobetasol (TEMOVATE) 0.05 % external solution Apply to scalp as needed 50 mL 0     ferrous gluconate (FERGON) 324 MG tablet TAKE 1 TABLET(324 MG) BY MOUTH DAILY WITH BREAKFAST 90 tablet 0     folic acid (FOLVITE) 1 MG tablet Take 1 mg by mouth daily.             LACTOBACILLUS ACIDOPHILUS (PROBIOTIC ORAL) Take 1 tablet by mouth daily.        lancets (ONETOUCH DELICA LANCETS) 33 gauge Misc Test twice daily 200 each 2     lisinopril (PRINIVIL,ZESTRIL) 10 MG tablet Take 1 tablet (10 mg total) by mouth daily. 90 tablet 3     nystatin (MYCOSTATIN) cream Perineum as needed 30 g 0     omeprazole (PRILOSEC) 20 MG capsule TAKE 1 CAPSULE(20 MG) BY MOUTH DAILY BEFORE  BREAKFAST 90 capsule 0     PARoxetine (PAXIL) 20 MG tablet Take 1 tablet (20 mg total) by mouth daily. 90 tablet 3     pioglitazone (ACTOS) 15 MG tablet Take 1 tablet (15 mg total) by mouth daily. 90 tablet 1     Current Facility-Administered Medications   Medication Dose Route Frequency Provider Last Rate Last Dose     denosumab 60 mg (PROLIA 60 mg/ml)  60 mg Subcutaneous Q6 Months Nadia Atkins NP   60 mg at 06/07/19 7368

## 2021-06-03 NOTE — TELEPHONE ENCOUNTER
Date: 1/23/2020 Status: Delvin   Time: 9:40 AM Length: 20   Visit Type: OFFICE VISIT [[id number]] Copay: $0.00   Provider: Nadia Atkins NP Department: [facility]   Referring Provider: ESTEBAN DAHL CSN: [id number]   Notes: osteo follow-up

## 2021-06-03 NOTE — TELEPHONE ENCOUNTER
----- Message from Era Carranza RN sent at 11/4/2019 11:59 AM CST -----  Regarding: FW: DXA too early    ----- Message -----  From: Nadia Atkins NP  Sent: 11/4/2019  11:39 AM CST  To: Era Carranza RN  Subject: FW: DXA too early                                Hey - can you get this sent to the Schedulers to get her rescheduled for after her Dexa please?  Thanks!      ----- Message -----  From: Damian Owen, RT (R)  Sent: 11/4/2019  11:25 AM CST  To: Nadia Atkins NP  Subject: DXA too early                                    Hi Lashonda!  This patient has an appointment with you for osteo f/u on 12/6. She was supposed to get her DXA scan today, but its a bit early and she has no therapy changes. So, I rescheduled her scan to the same day as her Prolia, which is January 3rd.     The patient didn't know if you still want to see her or she'll need to be rescheduled until the DXA result is ready.    If she needs to be rescheduled, you'd have to let the endo schedulers know, I can't do that with your appointments.    ThanksDamian

## 2021-06-04 VITALS
RESPIRATION RATE: 14 BRPM | BODY MASS INDEX: 26.5 KG/M2 | SYSTOLIC BLOOD PRESSURE: 100 MMHG | WEIGHT: 135 LBS | DIASTOLIC BLOOD PRESSURE: 60 MMHG | HEIGHT: 60 IN | HEART RATE: 88 BPM

## 2021-06-04 VITALS
DIASTOLIC BLOOD PRESSURE: 60 MMHG | HEART RATE: 80 BPM | BODY MASS INDEX: 27.45 KG/M2 | SYSTOLIC BLOOD PRESSURE: 112 MMHG | WEIGHT: 138.2 LBS

## 2021-06-04 VITALS
WEIGHT: 135 LBS | BODY MASS INDEX: 26.5 KG/M2 | SYSTOLIC BLOOD PRESSURE: 100 MMHG | HEART RATE: 88 BPM | HEIGHT: 60 IN | DIASTOLIC BLOOD PRESSURE: 60 MMHG

## 2021-06-04 NOTE — TELEPHONE ENCOUNTER
Refill Approved    Rx renewed per Medication Renewal Policy. Medication was last renewed on 10/7/19.    Christina Pineda, [facility] Connection Triage/Med Refill 1/4/2020     Requested Prescriptions   Pending Prescriptions Disp Refills     omeprazole (PRILOSEC) 20 MG capsule [Pharmacy Med Name: OMEPRAZOLE 20MG CAPSULES] 90 capsule 0     Sig: TAKE 1 CAPSULE(20 MG) BY MOUTH DAILY BEFORE BREAKFAST       GI Medications Refill Protocol Passed - 1/3/2020  3:23 AM        Passed - PCP or prescribing provider visit in last 12 or next 3 months.     Last office visit with prescriber/PCP: 11/19/2019 Chuy Avila MD OR same dept: 11/19/2019 Chuy Avila MD OR same specialty: 11/19/2019 Chuy Avila MD  Last physical: Visit date not found Last MTM visit: Visit date not found   Next visit within 3 mo: Visit date not found  Next physical within 3 mo: Visit date not found  Prescriber OR PCP: Chuy Avila MD  Last diagnosis associated with med order: 1. Esophageal reflux  - omeprazole (PRILOSEC) 20 MG capsule [Pharmacy Med Name: OMEPRAZOLE 20MG CAPSULES]; TAKE 1 CAPSULE(20 MG) BY MOUTH DAILY BEFORE BREAKFAST  Dispense: 90 capsule; Refill: 0    If protocol passes may refill for 12 months if within 3 months of last provider visit (or a total of 15 months).

## 2021-06-04 NOTE — TELEPHONE ENCOUNTER
RN cannot approve Refill Request    RN can NOT refill this medication med is not covered by policy/route to provider. Last office visit: 8/15/2018 Sheri Avila MD Last Physical: 11/30/2017 Last MTM visit: Visit date not found Last visit same specialty: 11/19/2019 Chuy Avila MD.  Next visit within 3 mo: Visit date not found  Next physical within 3 mo: Visit date not found      Aury English, [facility] Triage/Med Refill 12/15/2019    Requested Prescriptions   Pending Prescriptions Disp Refills     CALCITRATE 200 mg (950 mg) tablet [Pharmacy Med Name: CALCITRATE 950MG TABLETS] 200 tablet 0     Sig: TAKE 1 TABLET BY MOUTH TWICE DAILY       There is no refill protocol information for this order

## 2021-06-04 NOTE — PROGRESS NOTES

## 2021-06-05 VITALS
DIASTOLIC BLOOD PRESSURE: 56 MMHG | WEIGHT: 149 LBS | BODY MASS INDEX: 29.34 KG/M2 | HEART RATE: 92 BPM | SYSTOLIC BLOOD PRESSURE: 108 MMHG

## 2021-06-05 VITALS
BODY MASS INDEX: 28.56 KG/M2 | DIASTOLIC BLOOD PRESSURE: 68 MMHG | HEART RATE: 84 BPM | SYSTOLIC BLOOD PRESSURE: 118 MMHG | WEIGHT: 145 LBS

## 2021-06-05 NOTE — PROGRESS NOTES
[facility]  ENDOCRINOLOGY    Osteoporosis Follow Up 1/26/2020    Silvia Avila, 1935, [id number]          Reason for visit      1. Osteoporosis without current pathological fracture, unspecified osteoporosis type        History     Silvia Avila is a very pleasant 84 y.o. old female who presents for follow up.   SUMMARY:  Silvia Avila did have a breast cancer in 1997 treated with surgery radiation chemotherapy and she has been on Evista since that time.  She then had a follow-up bone density test done in June 2014 and at that time it showed that she was significantly below baseline and trabecular bone score was poor.  Her fracture risk was calculated to be high and in view of her decline on raloxifene. She is here to consider alternative treatment. She has a history of significant GI distress and requiring daily H2 blocker use and her calculated fracture risk would be considered high at this point.  I believe in the context of her current bone density including the low trabecular bone score she would be a candidate for denosumab.  We had a lengthy discussion about this and she is in agreement.    TODAY:  Silvia returns today in f/u for Osteoporosis. She has continued on the Prolia injections without difficulty.  Current Dexa Scan shows: The spine bone density L1-L2 with T-score -1.6, stable compared to 2017.2. Femoral bone densities show left femoral neck T- score -1.8 and right femoral neck T-score -1.9, stable. Current Vit D level is 58.1 and Calcium level is 9.3.  She walks when she can do so.     Risk Factors     The following high- risk conditions have been ruled out: celiac disease, eating disorders, gastric bypass, hyperparathyroidism, inflammatory bowel disease, hyperthyroidism, rheumatoid arthritis, lupus, chronic kidney disease.    Silvia Avila has the following risk factors: Age, Female gender,  and Low BMI    She is not on high risk medications such as glucocorticoids,  "anti-coagulants, anti-convulsants, chemotherapy or levothyroxine.    Patient deniesHysterectomy, Oophrectomy, Breast cancer and Family history of breast cancer.        Past Medical History     Patient Active Problem List   Diagnosis     Anemia     Type 2 diabetes mellitus (H)     Anxiety     Hypertension     Mixed hyperlipidemia     IgA monoclonal gammopathy of uncertain significance     Personal history of breast cancer     Bilateral sensorineural hearing loss     Gastroesophageal reflux disease     Osteopenia       Family History       family history includes Dementia in her brother and sister; No Medical Problems in her father and mother.    Social History      reports that she has never smoked. She has never used smokeless tobacco. She reports current alcohol use of about 2.0 standard drinks of alcohol per week. She reports that she does not use drugs.      Review of Systems     Patient denies current pain, limited mobility, fractures.   Remainder per HPI.      Vital Signs     /60   Pulse 88   Resp 14   Ht 4' 11.75\" (1.518 m)   Wt 135 lb (61.2 kg)   Breastfeeding No   BMI 26.59 kg/m      Physical Exam     GENERAL:  Normal, NIRD  EYES:  Pupils equal, round and reactive to light; no proptosis, lid lag or  periorbital edema.  THYROID:  Thyroid is normal.  No tenderness or bruit  NECK: No lymph nodes  MUSCULOSKELETAL: No joint abnormalities, FROM in all four extremities. No kyphosis. Muscle strength grossly normal without evidence of wasting.  HEART:  Regular rate and rhythm without murmur.  LUNGS:  Clear to auscultation.  ABDOMEN:Soft, non-tender, no masses or organomegaly  NEURO:  Patella Reflexes were normal.No tremors  SKIN:  No acanthosis nigricans or vitiligo        Assessment     1. Osteoporosis without current pathological fracture, unspecified osteoporosis type        Plan     Pt is scheduled for her next Prolia injection.  She will f/u with me in 1 year.       Total visit minutes:25  Time spent " counseling and coordination of care:23    Nadia Atkins   Endocrinology  1/26/2020  9:55 AM        Current Medications     Outpatient Medications Prior to Visit   Medication Sig Dispense Refill     atorvastatin (LIPITOR) 40 MG tablet TAKE 1 TABLET(40 MG) BY MOUTH AT BEDTIME 90 tablet 1     calcium citrate (CALCITRATE) 200 mg (950 mg) tablet Take 1 tablet (200 mg total) by mouth daily. 90 tablet 0     cholecalciferol, vitamin D3, (VITAMIN D3) 2,000 unit cap Take 1 capsule by mouth daily.       clobetasol (TEMOVATE) 0.05 % cream Apply 1 application topically 2 (two) times a day as needed. Apply daily as needed for eczema       ferrous gluconate (FERGON) 324 MG tablet TAKE 1 TABLET(324 MG) BY MOUTH DAILY WITH BREAKFAST 90 tablet 0     folic acid (FOLVITE) 1 MG tablet Take 1 mg by mouth daily.             LACTOBACILLUS ACIDOPHILUS (PROBIOTIC ORAL) Take 1 tablet by mouth daily.        lancets (ONETOUCH DELICA LANCETS) 33 gauge Misc Test twice daily 200 each 2     lisinopril (PRINIVIL,ZESTRIL) 10 MG tablet Take 1 tablet (10 mg total) by mouth daily. 90 tablet 3     nystatin (MYCOSTATIN) cream Perineum as needed 30 g 0     omeprazole (PRILOSEC) 20 MG capsule TAKE 1 CAPSULE(20 MG) BY MOUTH DAILY BEFORE BREAKFAST 90 capsule 3     PARoxetine (PAXIL) 20 MG tablet Take 1 tablet (20 mg total) by mouth daily. 90 tablet 3     pioglitazone (ACTOS) 15 MG tablet Take 1 tablet (15 mg total) by mouth daily. 90 tablet 1     Facility-Administered Medications Prior to Visit   Medication Dose Route Frequency Provider Last Rate Last Dose     denosumab 60 mg (PROLIA 60 mg/ml)  60 mg Subcutaneous Q6 Months Nadia Atkins NP   60 mg at 06/07/19 1301     denosumab 60 mg (PROLIA 60 mg/ml)  60 mg Subcutaneous Q6 Months Nadia Atkins NP   60 mg at 01/03/20 1321         Lab Results     PTH   Date Value Ref Range Status   01/21/2015 44 10 - 86 pg/mL Final     Calcium   Date Value Ref Range Status   01/23/2020 9.3 8.5 - 10.5 mg/dL Final      Iron   Date Value Ref Range Status   01/23/2020 92 42 - 175 ug/dL Final           Imaging Results   Last DEXA scan:  Results for orders placed in visit on 11/04/19   DXA Bone Density Scan    Narrative 1/3/2020      RE: Silvia Avila  YOB: 1935        Dear Chuy Avila,    Patient Profile:  84 y.o. female, postmenopausal, is here for the follow up bone density   test.   History of fractures - None. Family history of osteoporosis - None.    Family history of hip fracture: None. Smoking history - No. Osteoporosis   treatment past -  Yes;  HRT, Bisphosphonates and Prolia. Osteoporosis   treatment current - No.  Chronic medical problems - Breast cancer,   Diabetes Mellitus, Radiation treatment and Spine surgery. High risk   medications -  Chemotherapy;  Yes, in the Past and Aromatase Inhibitor;    Yes, in the Past.      Assessment:    1. The spine bone density L1-L2 with T-score -1.6, stable compared to   2017.  2. Femoral bone densities show left femoral neck T- score -1.8 and right   femoral neck T-score -1.9, stable.  3. Trabecular bone score indicates moderate trabecular bone architecture.      84 y.o. female with LOW BONE DENSITY (OSTEOPENIA) and HIGH fracture risk,   adjusted for the TBS, with major osteoporotic fracture risk 17.6% and hip   fracture risk 5.0%.         Recommendations:  Appropriate evaluation and treatment recommended with follow up bone   density scan after 1 year of active treatment.      Bone densitometry was performed on your patient using our [employer]   densitometer. The results are summarized and a copy of the actual scans   are included for your review. In conformity with the International Society   of Clinical Densitometry's most recent position statement for DXA   interpretation (2015), the diagnosis will be made on the lowest measured   T-score of the lumbar spine, femoral neck, total proximal femur or 33%   radius. Note the change in terminology for  diagnostic classification from   OSTEOPENIA to LOW BONE MASS. All trending for sequential exams will be   done using multiple vertebrae or the total proximal femur. Fracture risk   is based on the WHO Fracture Risk Assessment Tool (FRAX). If additional   information is needed or if you would like to discuss the results, please   do not hesitate to call me.       Thank you for referring this patient to [facility] Osteoporosis Services.   We are happy to be of service in support of you and your practice. If you   have any questions or suggestions to improve our service, please call me   at [phone].     Sincerely,     Michelle Zapata M.D. C.C.ADAM.  Osteoporosis Services, [facility]

## 2021-06-05 NOTE — PROGRESS NOTES
Assessment and Plan:     1. Type 2 diabetes mellitus without complication, without long-term current use of insulin (H)    Well controlled.  Last A1c 7.4 November 2019.  Recheck in May 2020    2. Hypertension    Controlled, actually mildly hypotensive today but asymptomatic    - Comprehensive Metabolic Panel    3. IgA monoclonal gammopathy of uncertain significance    This is managed yearly by Dr. Scott at [facility].  She does have a mild normocytic anemia which I believe is secondary to her MGUS.  She is on iron replacement but a ferritin in 2018 was normal.  She has no history of iron deficiency in the past.  I am going to recheck an IT S and a ferritin today and if these are normal I am going to have her come off of her iron and her folic acid.    - Ferritin  - Iron and Transferrin Iron Binding Capacity  - HM2(CBC w/o Differential)    4. Mixed hyperlipidemia    Stable.  Check at next diabetes F/U    5. Routine general medical examination at a health care facility      6. Other specified diabetes mellitus without complications (H)     - Ferritin    7. Diabetes mellitus due to underlying condition without complication, with long-term current use of insulin (H)     - Iron and Transferrin Iron Binding Capacity     The patient's current medical problems were reviewed.      The following health maintenance schedule was reviewed with the patient and provided in printed form in the after visit summary:   Health Maintenance   Topic Date Due     MEDICARE ANNUAL WELLNESS VISIT  06/14/2002     ZOSTER VACCINES (2 of 3) 05/18/2011     TD 18+ HE  11/01/2018     DIABETIC EYE EXAM  12/19/2019     A1C  05/19/2020     DIABETIC FOOT EXAM  07/19/2020     LIPID  07/19/2020     MICROALBUMIN  07/19/2020     FALL RISK ASSESSMENT  07/19/2020     BMP  11/06/2020     DXA SCAN  01/03/2022     ADVANCE CARE PLANNING  05/11/2022     PNEUMOCOCCAL IMMUNIZATION 65+ LOW/MEDIUM RISK  Completed     INFLUENZA VACCINE RULE BASED  Completed         Subjective:   Chief Complaint: Silvia Avila is an 84 y.o. female here for an Annual Wellness visit.   HPI: Silvia comes in today to establish care as well as for a physical exam.  A complete review of systems is undertaken was negative today.  Past medical and surgical history reviewed.  Medications and allergies were reviewed and reconciled.  She is a retired RN.  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    Review of Systems:  Please see above.  The rest of the review of systems are negative for all systems.    Patient Care Team:  Chuy Avila MD as PCP - General (Internal Medicine)  Chuy Avila MD as Assigned PCP  Swapna Infante PharmD as Pharmacist (Pharmacist)  Pat Scott MD as Physician (Internal Medicine)  Ray Coronel MD (Dermatology)  Shiv Coronel MD as Physician (Ophthalmology)     ILLNESSES, HOSPITALIZATIONS, AND OPERATIONS:    #1.  History of breast cancer status post lumpectomy, radiation, and chemotherapy in 1997.    #2.  Hypertension    3.  History of MGUS.    4.  Hyperlipidemia.    5.  History of osteo-porosis on Prolia, and last DEXA scan 3 weeks ago showing bone density is now in the osteopenic range.    6.  Anxiety, on Paxil.    7.  Type 2 diabetes    8.  Status post bunionectomy.    9.  Status post hemorrhoid banding.    10.  Status post carpal tunnel surgery.  Family History   Problem Relation Age of Onset     No Medical Problems Mother      No Medical Problems Father      Dementia Sister      Dementia Brother       Social History     Socioeconomic History     Marital status:      Spouse name: Not on file     Number of children: Not on file     Years of education: Not on file     Highest education level: Not on file   Occupational History     Not on file   Social Needs     Financial resource strain: Not on file     Food insecurity:     Worry: Not on file     Inability: Not on file     Transportation needs:     Medical: Not on file     Non-medical: Not on file    Tobacco Use     Smoking status: Never Smoker     Smokeless tobacco: Never Used   Substance and Sexual Activity     Alcohol use: Yes     Alcohol/week: 2.0 standard drinks     Types: 2 Glasses of wine per week     Comment: occasional glass of wine     Drug use: No     Sexual activity: Not on file   Lifestyle     Physical activity:     Days per week: Not on file     Minutes per session: Not on file     Stress: Not on file   Relationships     Social connections:     Talks on phone: Not on file     Gets together: Not on file     Attends Sikhism service: Not on file     Active member of club or organization: Not on file     Attends meetings of clubs or organizations: Not on file     Relationship status: Not on file     Intimate partner violence:     Fear of current or ex partner: Not on file     Emotionally abused: Not on file     Physically abused: Not on file     Forced sexual activity: Not on file   Other Topics Concern     Not on file   Social History Narrative     Not on file      Current Outpatient Medications   Medication Sig Dispense Refill     atorvastatin (LIPITOR) 40 MG tablet TAKE 1 TABLET(40 MG) BY MOUTH AT BEDTIME 90 tablet 1     calcium citrate (CALCITRATE) 200 mg (950 mg) tablet Take 1 tablet (200 mg total) by mouth daily. 90 tablet 0     cholecalciferol, vitamin D3, (VITAMIN D3) 2,000 unit cap Take 1 capsule by mouth daily.       clobetasol (TEMOVATE) 0.05 % cream Apply 1 application topically 2 (two) times a day as needed. Apply daily as needed for eczema       ferrous gluconate (FERGON) 324 MG tablet TAKE 1 TABLET(324 MG) BY MOUTH DAILY WITH BREAKFAST 90 tablet 0     folic acid (FOLVITE) 1 MG tablet Take 1 mg by mouth daily.             LACTOBACILLUS ACIDOPHILUS (PROBIOTIC ORAL) Take 1 tablet by mouth daily.        lancets (ONETOUCH DELICA LANCETS) 33 gauge Misc Test twice daily 200 each 2     lisinopril (PRINIVIL,ZESTRIL) 10 MG tablet Take 1 tablet (10 mg total) by mouth daily. 90 tablet 3      "nystatin (MYCOSTATIN) cream Perineum as needed 30 g 0     omeprazole (PRILOSEC) 20 MG capsule TAKE 1 CAPSULE(20 MG) BY MOUTH DAILY BEFORE BREAKFAST 90 capsule 3     PARoxetine (PAXIL) 20 MG tablet Take 1 tablet (20 mg total) by mouth daily. 90 tablet 3     pioglitazone (ACTOS) 15 MG tablet Take 1 tablet (15 mg total) by mouth daily. 90 tablet 1     Current Facility-Administered Medications   Medication Dose Route Frequency Provider Last Rate Last Dose     denosumab 60 mg (PROLIA 60 mg/ml)  60 mg Subcutaneous Q6 Months Nadia Atkins, NP   60 mg at 06/07/19 1301     denosumab 60 mg (PROLIA 60 mg/ml)  60 mg Subcutaneous Q6 Months Nadia Atkins, NP   60 mg at 01/03/20 1321      Objective:   Vital Signs:   Visit Vitals  /60   Pulse 88   Ht 4' 11.75\" (1.518 m)   Wt 135 lb (61.2 kg)   BMI 26.59 kg/m         VisionScreening:  No exam data present     PHYSICAL EXAM  OBJECTIVE:    In general the patient is alert pleasant and in no acute distress.    HEENT: Pupils equal round reactive light.  Oropharynx is clear.  Lymphatic shows no anterior posterior cervical lymphadenopathy.  No thyromegaly or other masses noted.    Cardiovascular: S1-S2 regular in rhythm no murmurs gallops rubs    Lungs are clear    Abdomen is soft nontender nondistended.  No HSM.    Extremities show no pedal edema present bilaterally.  DP pulses are 2+ and normal bilaterally.    Skin exam shows no concerning skin lesions.    Assessment Results 1/23/2020   Activities of Daily Living No help needed   Instrumental Activities of Daily Living No help needed   Mini Cog Total Score 5   Some recent data might be hidden     A Mini-Cog score of 0-2 suggests the possibility of dementia, score of 3-5 suggests no dementia    Identified Health Risks:     She is at risk for lack of exercise and has been provided with information to increase physical activity for the benefit of her well-being.  The patient was counseled and encouraged to consider modifying " their diet and eating habits. She was provided with information on recommended healthy diet options.  Information on urinary incontinence and treatment options given to patient.  She is at risk for falling and has been provided with information to reduce the risk of falling at home.

## 2021-06-06 NOTE — TELEPHONE ENCOUNTER
Order pended.  Nasrin Freeman [facility] ............... 1:42 PM, 02/12/20

## 2021-06-06 NOTE — TELEPHONE ENCOUNTER
Medication Request  Medication name: One Touch Ultra Two Glucometer  [facility]ed [facility]  Reason for request: Current meter is old and no longer working  When did you use medication last?:  Has not used yet  Patient offered appointment:  patient declined  Okay to leave a detailed message: yes

## 2021-06-07 NOTE — TELEPHONE ENCOUNTER
Refill request received from [facility] via fax for a refill of Pioglitazone 15 mg. Order pended.  Nasrin Freeman CMA ............... 2:26 PM, 03/27/20

## 2021-06-09 NOTE — PROGRESS NOTES
"Prolia Injection Phone Screen      Screening questions have been asked 2-3 days prior to administration visit for Prolia. If any questions are answered with \"Yes,\" this phone encounter were will routed to ordering provider for further evaluation.     1.  When was the last injection?  1/3/20    2.  Has insurance for this injection been verified?  Yes    3.  Did you experience any new onset achiness or rashes that lasted for over a month with your previous Prolia injection?   No    4.  Do you have a fever over 101?F or a new deep cough that is unusual for you today? No    5.  Have you started any new medications in the last 6 months that you were told could affect your immune system? These may have been prescribed by oncologist, transplant, rheumatology, or dermatology.   No    6.  In the last 6 months have you have gastric bypass or parathyroid surgery?   No    7.  Do you plan dental work requiring drilling into the bone such as implants/extractions or oral surgery in the next 2-3 months?   No    8. Do you have new insurance since the last injection?    Patient informed if symptoms discussed above present prior to their administration appointment, they are to notify clinic immediately.     Era Carranza          The following steps were completed to comply with the REMS program for Prolia:  1. Ordering provider has previously reviewed information in the Medication Guide and Patient Counseling Chart, including the serious risks of Prolia  and the symptoms of each risk and have been advised to seek prompt medical attention if they have signs or symptoms of any of the serious risks.  2. Provided each patient a copy of the Medication Guide and Patient Brochure.  See MAR for administration details.   Indication: Prolia  (denosumab) is a prescription medicine used to treat osteoporosis in patients who:   Are at high risk for fracture, meaning patients who have had a fracture related to osteoporosis, or who have multiple " risk factors for fracture; Cannot use another osteoporosis medicine or other osteoporosis medicines did not work well.   The timeline for early/late injections would be 4 weeks early and any time after the 6 month reina. If a patient receives their injection late, then the subsequent injection would be 6 months from the date that they actually received the injection    Have the screening questions been asked prior to this administration? Yes      After obtaining consent, and per orders of Nadia Atkins NP, injection of Prolia 60 mg given by Era Carranza. Patient instructed to remain in clinic for 20 minutes afterwards, and to report any adverse reaction to me immediately.

## 2021-06-10 NOTE — PROGRESS NOTES
[facility] Daily Progress     Patient Name: Silvia Avila  Date: 2017  Visit #3  Referral Diagnosis: lumbago  Referring provider: Sheri Avila MD  Visit Diagnosis:     ICD-10-CM    1. Right-sided low back pain without sciatica, unspecified chronicity M54.5    2. Pain of right sacroiliac joint M53.3    3. History of lumbar fusion Z98.890    4. Hypertension I10    5. Mixed hyperlipidemia E78.2    6. Osteoporosis M81.0    7. Type 2 diabetes mellitus E11.9    8. Malignant neoplasm of right breast C50.911          Assessment:     HEP/POC compliance is  good .  Response to Intervention good  Patient is benefitting from skilled physical therapy and is making steady progress toward functional goals.    Goal Status:  Pt. will demonstrate/verbalize independence in self-management of condition in : 4 weeks;12 weeks  Pt. will be independent with home exercise program in : 12 weeks  Pt. will bend: to clean;to dress;to do yard work;with less pain;with less difficulty;in 12 weeks;Comment  Comment:: wiht minimal increase in pain  Patient will sit: 60 minutes;with less pain;with less difficultty;in 12 weeks;Comment  Comment: with minimal increase in pain  Pt will: tolerate yard work for 30 min with minimal pain increase in 12 weeks.     Plan / Patient Education:     Continue with initial plan of care.  Progress with home program as tolerated.    Subjective:     Pain Ratin  Doing okay. Was pain free for a day after the last session. Gluts are sore from bridging ex. Notes some discomfort after prolonged sitting, with getting out of car. Unsure if KT is having an effect or not.       Objective:     Mild tenderness of (R) SI, ilium.   No tenderness of SGL-LV pts.  Fascial restrictions of cecum, ICV, (R) sacral lig.     Treatment Today     TREATMENT MINUTES COMMENTS   Evaluation     Self-care/ Home management     Manual therapy 55 Induction, indirect, direct techniques utilized as appropriate for optimal  tissue release.   MFR/SCS - (R) UPL5, (R) HFO-SI, (R) sacrotub lig, (R) EPIS-LV, ICV-V, cecum, pelvic diaphragm, supine L5S1 traction, iliac gap   Neuromuscular Re-education  Skip KT today   Therapeutic Activity     Therapeutic Exercises     Gait training     Modality__________________                Total 55    Blank areas are intentional and mean the treatment did not include these items.       Mary Marsh  5/30/2017

## 2021-06-10 NOTE — PROGRESS NOTES
ASSESSMENT and PLAN:  1. Diabetes mellitus, type 2  She sounds well controlled.  We did discuss making her metformin BID if needed and she's ok w/ that.  She's also open to increasing her lipitor to 20mg.  - lancets (ONETOUCH DELICA LANCETS) 33 gauge Misc; Test twice daily  Dispense: 200 each; Refill: 2  - Glycosylated Hemoglobin A1c    2. Type 2 diabetes mellitus without complication  See abouve  - blood glucose test (ONETOUCH ULTRA TEST) strips; Dispense brand per patient's insurance at pharmacy discretion.  Dispense: 200 strip; Refill: 2    3. Essential hypertension  Well controlled  - Comprehensive Metabolic Panel    4. Mixed hyperlipidemia  She's on a low dose and is open to increasing to 20mg  - Lipid Cascade  - Comprehensive Metabolic Panel    5. Lumbago  She's interested in myofasical release  - Ambulatory referral to PT/OT        Medications Discontinued During This Encounter   Medication Reason     ONETOUCH DELICA LANCETS 33 gauge Misc Reorder     ONETOUCH ULTRA TEST strips Reorder       Return in about 6 months (around 11/11/2017).    CHIEF COMPLAINT:  Chief Complaint   Patient presents with     Diabetes     Fasting       HISTORY OF PRESENT ILLNESS:  Silvia Avila is a 81 y.o. female  presenting to the clinic today for diabetes follow up. She is fasting.     Diabetes: Her blood sugars have been fluctuating. In the mornings they will sometimes be elevated, near the 150's. Yesterday morning her fasting blood sugar was 123 and in the evening 128.  She thinks the fluctuating blood sugars are due to her frequent meals. She takes one tablet of metformin. Her feet have looked discolored lately. For four years the side of her left foot has felt numb. The numbness does not bother her when she walks. Her most recent A1c from 11/17/16 was 7.2. Her levels were rechecked today.     Back Pain: Since she has had her back surgery she has been experiencing right sided back pain. She believes it is muscular. After  stretches and exercise the area feels better. She has not had physical therapy for the area, but did participate in it after her surgery.     Mixed Hyperlipidemia: Her last reading from 11/17/16 was 195 cholesterol, 115 triglycerides, 59 HDL, and 113 LDL.  She is willing to increase her atorvastatin to 20 mg. She is on a daily aspirin.     IgA Kappa MGUS: Her condition is followed by Dr. Scott.     Health Maintenance: She has her eye exams at [facility]. She received the pneumonia booster today.     REVIEW OF SYSTEMS:   There is a small mass on her neck that has been causing discomfort. She is unsure if it is a skin tag. All other systems are negative.    PFSH:  She had a spinal fusion two years ago.     TOBACCO USE:  History   Smoking Status     Never Smoker   Smokeless Tobacco     Never Used       VITALS:  Vitals:    05/11/17 0825   BP: 114/58   Patient Site: Right Arm   Pulse: 80   Weight: 139 lb (63 kg)     Wt Readings from Last 3 Encounters:   05/11/17 139 lb (63 kg)   11/17/16 138 lb (62.6 kg)   10/20/16 141 lb 14.4 oz (64.4 kg)       PHYSICAL EXAM:  Constitutional:  Reveals an alert, pleasant elderly female.   Vitals:  Noted.   Lungs: Clear to auscultation bilaterally, respirations unlabored.   Heart: Regular rate and rhythm, S1 and S2 normal, no murmur, rub, or gallop,   Extremities: Extremities normal, atraumatic, no cyanosis or edema   Skin: No sores or lesions on bottoms of feet bilaterally, on her neck there was a raised mass with wart-like projections   Neurologic: Normal     ADDITIONAL HISTORY SUMMARIZED (2): Reviewed note from 11/17/16 regarding anemia.   DECISION TO OBTAIN EXTRA INFORMATION (1): None.   RADIOLOGY TESTS (1): None.  LABS (1): Reviewed labs from 11/17/16. Ordered labs.   MEDICINE TESTS (1): None.  INDEPENDENT REVIEW (2 each): None.     The visit lasted a total of 15 minutes face to face with the patient. Over 50% of the time was spent counseling and educating the patient  about diabetes.    I, Indy Tamez, am scribing for and in the presence of, Dr. Sheri Avila.    I, Dr. Sheri Avila, personally performed the services described in this documentation, as scribed by Indy Tamez in my presence, and it is both accurate and complete.    MEDICATIONS:  Current Outpatient Prescriptions   Medication Sig Dispense Refill     ammonium lactate (AMLACTIN) 12 % cream APPLY PRN TO HANDS D  10     aspirin 81 mg chewable tablet Chew 81 mg daily.       atorvastatin (LIPITOR) 10 MG tablet TAKE 1 TABLET BY MOUTH EVERY DAY 30 tablet 10     blood glucose test (GLUCOSE BLOOD) strips One test strip three times a day 100 strip 3     blood glucose test (ONETOUCH ULTRA TEST) strips Dispense brand per patient's insurance at pharmacy discretion. 200 strip 2     calcium citrate (CALCITRATE) 200 mg (950 mg) tablet Take 1 tablet by mouth daily. Calium supplement for a year after surgery        cholecalciferol, vitamin D3, (VITAMIN D3) 2,000 unit cap Take 1 capsule by mouth daily.       clobetasol (TEMOVATE) 0.05 % cream Apply 1 application topically 2 (two) times a day as needed. Apply daily as needed for eczema       clobetasol (TEMOVATE) 0.05 % external solution APPLY TO SCALP RASH AS NEEDED  10     docusate sodium (COLACE) 100 MG capsule Take 100 mg by mouth 2 (two) times a day.       ferrous gluconate (FERGON) 324 MG tablet Take 1 tablet (324 mg total) by mouth daily with breakfast. 90 tablet 3     fluocinonide (LIDEX) 0.05 % external solution Apply 1 application topically daily as needed. Apply to scalp       fluticasone (FLONASE) 50 mcg/actuation nasal spray INHALE 2 SPRAYS IN EACH NOSTRIL DAILY 16 g 1     folic acid (FOLVITE) 1 MG tablet Take 400 mcg by mouth daily.        LACTOBACILLUS ACIDOPHILUS (PROBIOTIC ORAL) Take 1 tablet by mouth daily.        lancets (ONETOUCH DELICA LANCETS) 33 gauge Misc Test twice daily 200 each 2     lancets Misc Use As Directed.       lisinopril  (PRINIVIL,ZESTRIL) 10 MG tablet TAKE ONE TABLET BY MOUTH EVERY DAY. 90 tablet 2     metFORMIN (GLUCOPHAGE) 500 MG tablet Take 1 tablet (500 mg total) by mouth daily with breakfast. 90 tablet 1     nystatin (MYCOSTATIN) cream Apply 1 application topically as needed for dry skin. Perineum as needed       omeprazole (PRILOSEC) 20 MG capsule TAKE ONE CAPSULE BY MOUTH EVERY DAY 90 capsule 1     PARoxetine (PAXIL) 20 MG tablet TAKE 1 TABLET BY MOUTH DAILY 90 tablet 1     VITAMIN D2 50,000 unit capsule   4     Current Facility-Administered Medications   Medication Dose Route Frequency Provider Last Rate Last Dose     denosumab 60 mg (PROLIA 60 mg/ml)  60 mg Subcutaneous Q6 Months Ilene Granados MD   60 mg at 07/28/16 1132       Total data points: 3

## 2021-06-10 NOTE — PROGRESS NOTES
[facility] Certification Request    May 19, 2017      Patient: Silvia Avila  MR Number: [id number]  YOB: 1935  Date of Visit: 5/19/2017      Dear Dr. vAila:    Thank you for this referral.   We are seeing Silvia Avila for Physical Therapy of lumbago.    Medicare and/or Medicaid requires physician review and approval of the treatment plan. Please review the plan of care and verify that you agree with the therapy plan of care by co-signing this note.      Plan of Care  Authorization / Certification Start Date: 05/19/17  Authorization / Certification End Date: 08/17/17  Authorization / Certification Number of Visits: HP/MC  Communication with: Referral Source  Patient Related Instruction: Nature of Condition;Treatment plan and rationale;Self Care instruction;Basis of treatment;Body mechanics;Posture;Next steps  Times per Week: 1-2  Number of Visits: up to 12  Discharge Planning: HEP, self management  Precautions / Restrictions : osteoporosis  Therapeutic Exercise: ROM;Stretching;Strengthening  Neuromuscular Reeducation: posture;core  Manual Therapy: strain counterstrain;myofascial release    Goals:  Pt. will demonstrate/verbalize independence in self-management of condition in : 4 weeks;12 weeks  Pt. will be independent with home exercise program in : 12 weeks  Pt. will bend: to clean;to dress;to do yard work;with less pain;with less difficulty;in 12 weeks;Comment  Comment:: wiht minimal increase in pain  Patient will sit: 60 minutes;with less pain;with less difficultty;in 12 weeks;Comment  Comment: with minimal increase in pain  Pt will: tolerate yard work for 30 min with minimal pain increase in 12 weeks.       If you have any questions or concerns, please don't hesitate to call.    Sincerely,      Mary Marsh, PT        Physician recommendation:     ___ Follow therapist's recommendation        ___ Modify therapy      *Physician co-signature indicates they certify the need for  these services furnished within this plan and while under their care.      [facility]   [facility]umbo-Pelvic Initial Evaluation    Patient Name: Silvia Avila  Date of evaluation: 5/19/2017  Visit #1  Referral Diagnosis: lumbago  Referring provider: Sheri Avila MD  Visit Diagnosis:     ICD-10-CM    1. Right-sided low back pain without sciatica, unspecified chronicity M54.5    2. Pain of right sacroiliac joint M53.3    3. History of lumbar fusion Z98.890    4. Hypertension I10    5. Mixed hyperlipidemia E78.2    6. Osteoporosis M81.0    7. Type 2 diabetes mellitus E11.9    8. Malignant neoplasm of right breast C50.911        Assessment:   Silvia Avila is a 81 y.o. female who presents to therapy today with chief complaints of (R) LB, pelvis pain. Onset date of sx was 7/15, worsening x 2 weeks.  Functional impairments include standing, sitting, bending, yard work, lifting.  Clinical findings include posture and pelvic asymmetries, decreased trunk ROM, (+) (R) neural tension tests, tenderness of (R) SI joint, (R) post pelvis, sacrum.          Pt. is appropriate for skilled PT intervention as outlined in the Plan of Care (POC).  Pt. is a good candidate for skilled PT services to improve pain levels and function.    Goals:  Pt. will demonstrate/verbalize independence in self-management of condition in : 4 weeks;12 weeks  Pt. will be independent with home exercise program in : 12 weeks  Pt. will bend: to clean;to dress;to do yard work;with less pain;with less difficulty;in 12 weeks;Comment  Comment:: wiht minimal increase in pain  Patient will sit: 60 minutes;with less pain;with less difficultty;in 12 weeks;Comment  Comment: with minimal increase in pain  Pt will: tolerate yard work for 30 min with minimal pain increase in 12 weeks.     Patient's expectations/goals are realistic.    Barriers to Learning or Achieving Goals:  No Barriers.       Plan / Patient Instructions:        Plan of Care:    Authorization / Certification Start Date: 17  Authorization / Certification End Date: 17  Authorization / Certification Number of Visits: HP/MC  Communication with: Referral Source  Patient Related Instruction: Nature of Condition;Treatment plan and rationale;Self Care instruction;Basis of treatment;Body mechanics;Posture;Next steps  Times per Week: 1-2  Number of Visits: up to 12  Discharge Planning: HEP, self management  Precautions / Restrictions : osteoporosis  Therapeutic Exercise: ROM;Stretching;Strengthening  Neuromuscular Reeducation: posture;core  Manual Therapy: strain counterstrain;myofascial release    POC and pathology of condition were reviewed with patient.  Pt. is in agreement with the Plan of Care    Plan for next visit: manual therapy, kinesio tape, home exercises.      Subjective:         Social information:   Living Situation:[facility] and lives with others    Occupation:retired   Work Status:NA   Equipment Available: None    History of Present Illness:    Silvia is a 81 y.o. female who presents to therapy today with complaints of chronic (R) low back/pelvic, lat hip pain. Denies any leg sx. Date of onset/duration of symptoms is 7/15. Hx of chronic low back pain prior to surgery. Has had previous PT, injections. Onset was gradual. Symptoms are intermittent and getting worse. She states she had had pain in this area since surgery, notes increased sx over the last couple weeks.  She reports  A chronic  history of similar symptoms. She describes their previous level of function as not limited.  She goes to the gym 1-2x/week, uses stationary bike, walks.  She is not doing all of her previous home program.   Hx of POSTERIOR FUSION WITH TRANSFORAMINAL LUMBAR INTERBODY FUSION & FACETECTOMY L4-5 BILATERAL on 7/20/15.    Pain Ratin  Pain rating at best: 0  Pain rating at worst: 4  Pain description: pain    Functional limitations are described as occurring with:   knee pain with descending  stairs  bending  lifting  performing routine daily activities  sitting 30 min   standing 45 min  gardening 15 min  Wakes 1x/night    Patient reports benefit from:  rest  , massage, modifying activities         Objective:      Note: Items left blank indicates the item was not performed or not indicated at the time of the evaluation.    Patient Outcome Measures :    Modified Oswestry Low Back Pain Disablity Questionnaire  in %: 26   Scores range from 0-100%, where a score of 0% represents minimal pain and maximal function. The minimal clinically important difference is a score reduction of 12%.    Examination  1. Right-sided low back pain without sciatica, unspecified chronicity     2. Pain of right sacroiliac joint     3. History of lumbar fusion     4. Hypertension     5. Mixed hyperlipidemia     6. Osteoporosis     7. Type 2 diabetes mellitus     8. Malignant neoplasm of right breast       Precautions/Restrictions: osteoporosis  Involved side: Right  Posture Observation:      General standing posture is fair.  Cervical:  Moderate forward head  Shoulder/Thoracic complex: Moderately increased CT junction thoracic kyphosis  Moderately increased mid thoracic kyphosis  Lumbopelvic complex: Mild scoliosis  Moderately decreased lumbar lordosis  Pelvic alignment: (R) PSIS post in standing   (R) pubis post in supine    Lumbar ROM:    % of normal  Date: 5/19/17     *Indicate scale AROM AROM AROM   Lumbar Flexion 85% (R) LB     Lumbar Extension 60%      Right Left Right Left Right Left   Lumbar Sidebending WNL WNL (R) LB       Lumbar Rotation         Thoracic Flexion      Thoracic Extension      Thoracic Sidebending         Thoracic Rotation           Lower Extremity Strength:   NA  Date:      LE strength/5 Right Left Right Left Right Left   Hip Flexion (L1-3)         Hip Extension (L5-S1)         Hip Abduction (L4-5)         Hip Adduction (L2-3)         Hip External Rotation         Hip Internal Rotation         Knee  Extension (L3-4)         Knee Flexion         Ankle Dorsiflexion (L4-5)         Great Toe Extension (L5)         Ankle Plantar flexion (S1)         Abdominals        Sensation    NA today       Reflex Testing  Lumbar Dermatomes Right Left UE Reflexes Right Left   Iliac Crest and Groin (L1)   Biceps (C5-6)     Anterior Medial Thigh (L2)   Brachioradialis (C5-6)     Anterior Thigh, Medial Epicondyle Femur (L3)   Triceps (C7-8)     Lateral Thigh, Anterior Knee, Medial Leg/Malleolus (L4)   Itzel s test     Lateral Leg, Dorsal Foot (L5)   LE Reflexes     Lateral Foot (S1)   Patellar (L3-4)     Posterior Leg (S2)   Achilles (S1-2)     Other:   Babinski Response         Lumbar Special Tests:     Lumbar Special Tests Right Left SI Tests Right  Left   Quadrant test   SI Compression     Straight leg raise 60 (+) 65 (-) SI Distraction     Crossover response   POSH Test     Slump pos neg Sacral Thrust     Sit-up test  FADIR     Trunk extensor endurance test  NOEL     Prone instability test  Resisted Abduction     Pubic shotgun  Other:       LE Screen/flexibility:  Hip IR  (R) 22  Knee pain     (L) WNL  Hip ER (R) WNL   (L) WNL    Palpation:tenderness of (R) SI joint, (R) glut med/SGL-LV, (L) EPIS-LV pts,   Pt has hx of shingles on (R) side, continues to have sensitivity in (R) hip area.     Passive Mobility - Joint Integrity:  NA due to fusion.       Lines of force:  ILOF innominate tight (R)  ELOF innominate neg      Treatment Today     TREATMENT MINUTES COMMENTS   Evaluation 35    Self-care/ Home management     Manual therapy 15 Induction, indirect, direct techniques utilized as appropriate for optimal tissue release.   MFR/SCS - (R) SGL-LV, (R) UPL5, (L) EPIS2-LV, (L) PS2,    Neuromuscular Re-education 10 KT -  (R) SI jt.  Patient educated in tape wear and issued handout.    Therapeutic Activity NC Discussed continuing with current stretches, doing bridge ex.    Therapeutic Exercises     Gait training      Modality__________________                Total 60    Blank areas are intentional and mean the treatment did not include these items.     PT Evaluation Code: (Please list factors)  Patient History/Comorbidities: 5  Examination: 2  Clinical Presentation: stable  Clinical Decision Making: low    Patient History/  Comorbidities Examination  (body structures and functions, activity limitations, and/or participation restrictions) Clinical Presentation Clinical Decision Making (Complexity)   No documented Comorbidities or personal factors 1-2 Elements Stable and/or uncomplicated Low   1-2 documented comorbidities or personal factor 3 Elements Evolving clinical presentation with changing characteristics Moderate   3-4 documented comorbidities or personal factors 4 or more Unstable and unpredictable High                Mary Marsh  5/19/2017  2:00 PM

## 2021-06-10 NOTE — PROGRESS NOTES
[facility] Daily Progress     Patient Name: Silvia Avila  Date: 2017  Visit #2  Referral Diagnosis: lumbago  Referring provider: Sheri Avila MD  Visit Diagnosis:     ICD-10-CM    1. Right-sided low back pain without sciatica, unspecified chronicity M54.5    2. Pain of right sacroiliac joint M53.3    3. History of lumbar fusion Z98.890    4. Hypertension I10    5. Mixed hyperlipidemia E78.2    6. Osteoporosis M81.0    7. Type 2 diabetes mellitus E11.9    8. Malignant neoplasm of right breast C50.911          Assessment:     HEP/POC compliance is  good .  Response to Intervention good  Patient is benefitting from skilled physical therapy and is making steady progress toward functional goals.    Goal Status:  Pt. will demonstrate/verbalize independence in self-management of condition in : 4 weeks;12 weeks  Pt. will be independent with home exercise program in : 12 weeks  Pt. will bend: to clean;to dress;to do yard work;with less pain;with less difficulty;in 12 weeks;Comment  Comment:: wiht minimal increase in pain  Patient will sit: 60 minutes;with less pain;with less difficultty;in 12 weeks;Comment  Comment: with minimal increase in pain  Pt will: tolerate yard work for 30 min with minimal pain increase in 12 weeks.     Plan / Patient Education:     Continue with initial plan of care.  Progress with home program as tolerated.    Subjective:     Pain Ratin  Pain is a little better.  Bridging exercise seems to be helpful. Still has tape on, not sure how it's working.  Sx remain located in (R) SI/pelvic area.       Objective:     (R) PSIS post in standing.  GL (+) for (R) SI jt.   Tender PT (R) SI joint, (R) post pelvis    Treatment Today     TREATMENT MINUTES COMMENTS   Evaluation     Self-care/ Home management     Manual therapy 53 Induction, indirect, direct techniques utilized as appropriate for optimal tissue release.   MFR/SCS - (R) hemipelvis, (R) UPL5, (R) SGL-LV, (R) HFO-SI, sacral  release, ICV-V, (R) sacrotub lig, (R) EPIS-LV, prone (R) piriformis release,    Neuromuscular Re-education nc KT - (R) SI jt.    Therapeutic Activity     Therapeutic Exercises     Gait training     Modality__________________                Total 55    Blank areas are intentional and mean the treatment did not include these items.       Mary Marsh  5/25/2017

## 2021-06-11 NOTE — PROGRESS NOTES
"[facility] Daily Progress     Patient Name: Silvia Avila  Date: 2017  Visit #8 ( )  Referral Diagnosis: lumbago  Referring provider: Sheri Avila MD  Visit Diagnosis:     ICD-10-CM    1. Right-sided low back pain without sciatica, unspecified chronicity M54.5    2. Pain of right sacroiliac joint M53.3    3. History of lumbar fusion Z98.890    4. Mixed hyperlipidemia E78.2    5. Hypertension I10    6. Osteoporosis M81.0    7. Type 2 diabetes mellitus E11.9    8. Malignant neoplasm of right breast C50.911          Assessment:     HEP/POC compliance is  good .  Patient demonstrates understanding/independence with home program.  Response to Intervention good  Patient is benefitting from skilled physical therapy and is making steady progress toward functional goals.    Goal Status:  Pt. will demonstrate/verbalize independence in self-management of condition in : 12 weeks;Partially Met  Pt. will be independent with home exercise program in : 12 weeks;Partially met  Pt. will bend: to clean;to dress;to do yard work;with less pain;with less difficulty;in 12 weeks;Comment;Progressing toward  Comment:: wiht minimal increase in pain  status: 1/10 pain increase with bending activities  status: increases by 2/10 with bending  Patient will sit: 60 minutes;with less pain;with less difficultty;in 12 weeks;Comment;Met  Comment: with minimal increase in pain    Pt will: tolerate yard work for 30 min with minimal pain increase in 12 weeks.   status: mostly met. tolerance is 30 min    Plan / Patient Education:     Continue with initial plan of care.  Progress with home program as tolerated.    Subjective:     Pain Ratin-1/10  \"I think it's getting better\". Sx are intermittent and becoming less frequent.  \"I'm not thinking about it as much\".  Pain level is 3-4/10 at worst.       Objective:     Leg length (=) in supine.   Tenderness of (R) ant pelvis/ilium.  Overall tenderness of SI joint has improved " in the last few visits, mild tenderness today.     Treatment Today     TREATMENT MINUTES COMMENTS   Evaluation     Self-care/ Home management     Manual therapy 55 Induction, indirect, direct techniques utilized as appropriate for optimal tissue release.   MFR/SCS - (R) psoas release, pelvic diaphragm, supine LS traction, iliac gap, (R) UPL5, (R) LPL5, (R) HFO-SI, (R) EPIS-LV,    Neuromuscular Re-education     Therapeutic Activity     Therapeutic Exercises     Gait training     Modality__________________                Total 55    Blank areas are intentional and mean the treatment did not include these items.       Mary Marsh  6/16/2017

## 2021-06-11 NOTE — PROGRESS NOTES
"[facility] Daily Progress     Patient Name: Silvia Avila  Date: 2017  Visit #6 ( HP)  Referral Diagnosis: lumbago  Referring provider: Sheri Avila MD  Visit Diagnosis:     ICD-10-CM    1. Right-sided low back pain without sciatica, unspecified chronicity M54.5    2. Pain of right sacroiliac joint M53.3    3. History of lumbar fusion Z98.890    4. Hypertension I10    5. Mixed hyperlipidemia E78.2    6. Osteoporosis M81.0    7. Type 2 diabetes mellitus E11.9    8. Malignant neoplasm of right breast C50.911    9. Status post lumbar spinal fusion Z98.1          Assessment:     Response to Intervention good  Patient is benefitting from skilled physical therapy and is making steady progress toward functional goals.  Patient is appropriate to continue with skilled physical therapy intervention, as indicated by initial plan of care.    Goal Status:  Pt. will demonstrate/verbalize independence in self-management of condition in : 4 weeks;12 weeks;Progressing toward  Pt. will be independent with home exercise program in : 12 weeks;Progressing toward  Pt. will bend: to clean;to dress;to do yard work;with less pain;with less difficulty;in 12 weeks;Comment;Progressing toward  Comment:: wiht minimal increase in pain  status: 1/10 pain increase with bending activities  Patient will sit: 60 minutes;with less pain;with less difficultty;in 12 weeks;Comment;Met  Comment: with minimal increase in pain  Pt will: tolerate yard work for 30 min with minimal pain increase in 12 weeks.   status: tolerance is 20-30 min    Plan / Patient Education:     Continue with initial plan of care.  Progress with home program as tolerated.    Subjective:     Pain Ratin  Doing okay.  \"It's there\". Sx are intermittent. Pain range is 0-4/10.  She also recalls having a bone marrow biopsy some years ago.       Objective:     (R) PSIS post in standing.  (R) FB test mildly (+).  Tenderness of (R) upper SI joint/post ilium, (R) " sup/lat pubis.    Treatment Today     TREATMENT MINUTES COMMENTS   Evaluation     Self-care/ Home management     Manual therapy 55 Induction, indirect, direct techniques utilized as appropriate for optimal tissue release.   MFR/SCS - ICV-V, (R) broad lig, (R) hemipelvis, (R) ing, (R) BL-V, pelvic diaphragm, bladder, (R) PS1-2, prone LS traction   Neuromuscular Re-education     Therapeutic Activity     Therapeutic Exercises     Gait training     Modality__________________                Total 55    Blank areas are intentional and mean the treatment did not include these items.       Mary Marsh  6/9/2017

## 2021-06-11 NOTE — PROGRESS NOTES
"[facility] Daily Progress     Patient Name: Silvia Avila  Date: 2017  Visit #7 ( )  Referral Diagnosis: lumbago  Referring provider: Sheri Avila MD  Visit Diagnosis:     ICD-10-CM    1. Right-sided low back pain without sciatica, unspecified chronicity M54.5    2. Pain of right sacroiliac joint M53.3    3. History of lumbar fusion Z98.890    4. Mixed hyperlipidemia E78.2    5. Hypertension I10    6. Osteoporosis M81.0    7. Type 2 diabetes mellitus E11.9    8. Malignant neoplasm of right breast C50.911    9. Status post lumbar spinal fusion Z98.1          Assessment:     Patient demonstrates understanding/independence with home program.  Response to Intervention good  Patient is benefitting from skilled physical therapy and is making steady progress toward functional goals.  Patient is appropriate to continue with skilled physical therapy intervention, as indicated by initial plan of care.    Goal Status:  Pt. will demonstrate/verbalize independence in self-management of condition in : 4 weeks;12 weeks;Progressing toward  Pt. will be independent with home exercise program in : 12 weeks;Progressing toward  Pt. will bend: to clean;to dress;to do yard work;with less pain;with less difficulty;in 12 weeks;Comment;Progressing toward  Comment:: wiht minimal increase in pain  status: 1/10 pain increase with bending activities  Patient will sit: 60 minutes;with less pain;with less difficultty;in 12 weeks;Comment;Met  Comment: with minimal increase in pain  Pt will: tolerate yard work for 30 min with minimal pain increase in 12 weeks.   status: tolerance is 20-30 min    Plan / Patient Education:     Continue with initial plan of care.  Progress with home program as tolerated.    Subjective:     Pain Ratin  Things are going pretty well.\" I think my back feels better\". Pain level has decreased. Did a lot of steps over the weekend and tolerated them okay.        Objective:     (R) PSIS post " in standing. (R) sacral base post.     Treatment Today     TREATMENT MINUTES COMMENTS   Evaluation     Self-care/ Home management     Manual therapy 20 Induction, indirect, direct techniques utilized as appropriate for optimal tissue release.   MFR/SCS - (R) hemipelvis, (R) UPL5, (R) LPL5, sacral release   Neuromuscular Re-education     Therapeutic Activity     Therapeutic Exercises 10 instr in self MET for pelvic alignment   Gait training     Modality__________________                Total 30    Blank areas are intentional and mean the treatment did not include these items.       Mary Marsh  6/13/2017

## 2021-06-11 NOTE — TELEPHONE ENCOUNTER
RN cannot approve Refill Request    RN can NOT refill this medication Protocol failed and NO refill given. Last office visit: Visit date not found Last Physical: Visit date not found Last MTM visit: Visit date not found Last visit same specialty: 11/19/2019 Chuy Avila MD.  Next visit within 3 mo: Visit date not found  Next physical within 3 mo: Visit date not found      Deepali Zaragoza, [facility] Connection Triage/Med Refill 9/23/2020    Requested Prescriptions   Pending Prescriptions Disp Refills     pioglitazone (ACTOS) 15 MG tablet [Pharmacy Med Name: PIOGLITAZONE 15MG TABLETS] 90 tablet 1     Sig: TAKE 1 TABLET(15 MG) BY MOUTH DAILY       Pioglitazone Protocol Failed - 9/21/2020  3:21 PM        Failed - Visit with PCP or prescribing provider visit in last 6 months      Last office visit with prescriber/PCP: Visit date not found OR same dept: 11/19/2019 Chuy Avila MD OR same specialty: 11/19/2019 Chuy Avila MD Last physical: Visit date not found Last MTM visit: Visit date not found         Next appt within 3 mo: Visit date not found  Next physical within 3 mo: Visit date not found  Prescriber OR PCP: Tarun Tovar MD  Last diagnosis associated with med order: 1. Type 2 diabetes mellitus without complication, without long-term current use of insulin (H)  - pioglitazone (ACTOS) 15 MG tablet [Pharmacy Med Name: PIOGLITAZONE 15MG TABLETS]; TAKE 1 TABLET(15 MG) BY MOUTH DAILY  Dispense: 90 tablet; Refill: 1     If protocol passes may refill for 12 months if within 3 months of last provider visit (or a total of 15 months).           Failed - A1C in last 6 months     Hemoglobin A1c   Date Value Ref Range Status   11/19/2019 7.4 (H) 3.5 - 6.0 % Final               Failed - Microalbumin in last year     Microalbumin, Random Urine   Date Value Ref Range Status   07/19/2019 4.21 (H) 0.00 - 1.99 mg/dL Final                  Passed - Blood pressure in last 12 months     BP Readings from Last  1 Encounters:   01/23/20 100/60             Passed - LFT or AST or ALT in last 12 months     Albumin   Date Value Ref Range Status   01/23/2020 3.7 3.5 - 5.0 g/dL Final     Bilirubin, Total   Date Value Ref Range Status   01/23/2020 0.8 0.0 - 1.0 mg/dL Final     Bilirubin, Direct   Date Value Ref Range Status   07/21/2015 0.3 <=0.5 mg/dL Final     Alkaline Phosphatase   Date Value Ref Range Status   01/23/2020 73 45 - 120 U/L Final     AST   Date Value Ref Range Status   01/23/2020 18 0 - 40 U/L Final     ALT   Date Value Ref Range Status   01/23/2020 14 0 - 45 U/L Final     Protein, Total   Date Value Ref Range Status   01/23/2020 7.3 6.0 - 8.0 g/dL Final                Passed - Serum creatinine in last year     Creatinine   Date Value Ref Range Status   01/23/2020 1.30 (H) 0.60 - 1.10 mg/dL Final

## 2021-06-11 NOTE — PROGRESS NOTES
"[facility] Daily Progress     Patient Name: Silvia Avila  Date: 2017  Visit #9 ( )  Referral Diagnosis: lumbago  Referring provider: Sheri Avila MD  Visit Diagnosis:     ICD-10-CM    1. Right-sided low back pain without sciatica, unspecified chronicity M54.5    2. Pain of right sacroiliac joint M53.3    3. History of lumbar fusion Z98.890    4. Mixed hyperlipidemia E78.2    5. Hypertension I10    6. Osteoporosis M81.0    7. Type 2 diabetes mellitus E11.9    8. Malignant neoplasm of right breast C50.911          Assessment:     HEP/POC compliance is  good .  Patient demonstrates understanding/independence with home program.  Response to Intervention good  Patient is benefitting from skilled physical therapy and is making steady progress toward functional goals.       Goal Status:  Pt. will demonstrate/verbalize independence in self-management of condition in : 12 weeks;Partially Met  Pt. will be independent with home exercise program in : 12 weeks;Partially met  Pt. will bend: to clean;to dress;to do yard work;with less pain;with less difficulty;in 12 weeks;Comment;Progressing toward  Comment:: wiht minimal increase in pain  status: 1/10 pain increase with bending activities  status: increases by 2/10 with bending  Patient will sit: 60 minutes;with less pain;with less difficultty;in 12 weeks;Comment;Met  Comment: with minimal increase in pain    Pt will: tolerate yard work for 30 min with minimal pain increase in 12 weeks.   status: mostly met. tolerance is 30 min    Plan / Patient Education:     Continue with initial plan of care.  Progress with home program as tolerated.    Subjective:     Pain Ratin-1/10  \"Not too bad\". Continues to feel better.  Notes difficulty getting out of car. Tolerated last session fine.       Objective:     Mild/mod (R) SI tenderness.    Treatment Today     TREATMENT MINUTES COMMENTS   Evaluation     Self-care/ Home management     Manual therapy 55 " Induction, indirect, direct techniques utilized as appropriate for optimal tissue release.   MFR/SCS - (R) hemipelvis, CECM-V, prone LS traction, ()R UPL5, (R) LPL5, (R) sacrotub lig, (R) EPIS-LV, sacral release,    Neuromuscular Re-education     Therapeutic Activity     Therapeutic Exercises     Gait training     Modality__________________                Total 55    Blank areas are intentional and mean the treatment did not include these items.       Mary Marsh  6/20/2017

## 2021-06-11 NOTE — TELEPHONE ENCOUNTER
Refill Approved    Rx renewed per Medication Renewal Policy. Medication was last renewed on 8/28/19.    Deepali Zaragoza, [facility] Triage/Med Refill 9/15/2020     Requested Prescriptions   Pending Prescriptions Disp Refills     lisinopriL (PRINIVIL,ZESTRIL) 10 MG tablet [Pharmacy Med Name: LISINOPRIL 10MG TABLETS] 90 tablet 3     Sig: TAKE 1 TABLET(10 MG) BY MOUTH DAILY       [employer] Refill Protocol Passed - 9/14/2020  3:24 AM        Passed - PCP or prescribing provider visit in past 12 months       Last office visit with prescriber/PCP: 11/19/2019 Chuy Avila MD OR same dept: 11/19/2019 Chuy Avila MD OR same specialty: 11/19/2019 Chuy Avila MD  Last physical: 1/23/2020 Last MTM visit: Visit date not found   Next visit within 3 mo: Visit date not found  Next physical within 3 mo: Visit date not found  Prescriber OR PCP: Chuy Avila MD  Last diagnosis associated with med order: 1. Essential hypertension, malignant  - lisinopriL (PRINIVIL,ZESTRIL) 10 MG tablet [Pharmacy Med Name: LISINOPRIL 10MG TABLETS]; TAKE 1 TABLET(10 MG) BY MOUTH DAILY  Dispense: 90 tablet; Refill: 3    If protocol passes may refill for 12 months if within 3 months of last provider visit (or a total of 15 months).             Passed - Serum Potassium in past 12 months     Lab Results   Component Value Date    Potassium 4.6 01/23/2020             Passed - Blood pressure filed in past 12 months     BP Readings from Last 1 Encounters:   01/23/20 100/60             Passed - Serum Creatinine in past 12 months     Creatinine   Date Value Ref Range Status   01/23/2020 1.30 (H) 0.60 - 1.10 mg/dL Final

## 2021-06-11 NOTE — PROGRESS NOTES
"[facility] Daily Progress     Patient Name: Silvia Avila  Date: 2017  Visit #10 (10/20 )  Referral Diagnosis: lumbago  Referring provider: Sheri Avila MD  Visit Diagnosis:     ICD-10-CM    1. Right-sided low back pain without sciatica, unspecified chronicity M54.5    2. Pain of right sacroiliac joint M53.3    3. History of lumbar fusion Z98.890    4. Mixed hyperlipidemia E78.2    5. Hypertension I10    6. Osteoporosis M81.0    7. Type 2 diabetes mellitus E11.9    8. Malignant neoplasm of right breast C50.911          Assessment:     HEP/POC compliance is  good .  Patient demonstrates understanding/independence with home program.  Response to Intervention good  Patient is benefitting from skilled physical therapy and is making steady progress toward functional goals.    Goal Status:  Pt. will demonstrate/verbalize independence in self-management of condition in : 12 weeks;Partially Met  Pt. will be independent with home exercise program in : 12 weeks;Partially met  Pt. will bend: to clean;to dress;to do yard work;with less pain;with less difficulty;in 12 weeks;Comment;Progressing toward  Comment:: wiht minimal increase in pain  status: 1/10 pain increase with bending activities  status: increases by 2/10 with bending  Patient will sit: 60 minutes;with less pain;with less difficultty;in 12 weeks;Comment;Met  Comment: with minimal increase in pain    Pt will: tolerate yard work for 30 min with minimal pain increase in 12 weeks.   status: mostly met. tolerance is 30 min    Plan / Patient Education:     Continue with initial plan of care.  Progress with home program as tolerated.    Subjective:     Pain Ratin  Patient reports continued improvement.  \"It's getting better all the time\". Not so focused on the pain any more.       Objective:     Mod/mild tenderness of (R) ant pelvis, psoas, cecum, (R) SI joint.    Treatment Today     TREATMENT MINUTES COMMENTS   Evaluation     Self-care/ Home " management     Manual therapy 55 Induction, indirect, direct techniques utilized as appropriate for optimal tissue release.   MFR/SCS - CECM-V, pelvic diaphragm, (R) psoas, supine LS traction, iliac gap, (R) UPL5, (R) sacrotub lig, (L) HFO-SI,    Neuromuscular Re-education     Therapeutic Activity     Therapeutic Exercises     Gait training     Modality__________________                Total 55    Blank areas are intentional and mean the treatment did not include these items.       Mary Marsh  6/23/2017

## 2021-06-11 NOTE — PROGRESS NOTES
ASSESSMENT and PLAN:    #1.  Type 2 diabetes, controlled.  Continue Actos and we will plan on following up in 6 months.    2.  Hypertension, controlled.    3.  Hyperlipidemia, lipids at goal.    Problem List Items Addressed This Visit     Type 2 diabetes mellitus (H) - Primary    Relevant Orders    Influenza,Quad,High Dose,PF 65 YR+ (Completed)    Mixed hyperlipidemia    Hypertension          There are no Patient Instructions on file for this visit.    There are no discontinued medications.    No follow-ups on file.    CHIEF COMPLAINT:  Chief Complaint   Patient presents with     Diabetes     labs done - will need to do urine today       HISTORY OF PRESENT ILLNESS:  Silvia Avila is a 85 y.o. female  presenting to the clinic today for follow-up of her chronic medical conditions.  Currently on Actos monotherapy for her type 2 diabetes.  She is tolerating this well.  Last hemoglobin A1c yesterday was 7.5.  Acceptable for her age.  Blood pressure is under good control on lisinopril monotherapy.  She continues on high-dose statin therapy for her cholesterol.  Overall feeling well today.  Due for a urine microalbumin.  Renal function was excellent on her labs yesterday, lipids were at goal.    REVIEW OF SYSTEMS:   Pertinent positives noted in HPI, remainder of ROS is negative.    MEDICATIONS:  Current Outpatient Medications   Medication Sig Dispense Refill     atorvastatin (LIPITOR) 40 MG tablet TAKE 1 TABLET(40 MG) BY MOUTH AT BEDTIME 90 tablet 3     blood glucose meter (GLUCOMETER) Test one time daily. Dispense glucometer brand per patient's insurance at pharmacy discretion. 1 each 0     blood glucose test strips Test one time daily. Dispense brand per patient's insurance at pharmacy discretion. 100 strip 11     calcium citrate (CALCITRATE) 200 mg (950 mg) tablet Take 1 tablet (200 mg total) by mouth daily. 90 tablet 0     cholecalciferol, vitamin D3, (VITAMIN D3) 2,000 unit cap Take 1 capsule by mouth daily.        clobetasol (TEMOVATE) 0.05 % cream Apply 1 application topically 2 (two) times a day as needed. Apply daily as needed for eczema       ferrous gluconate (FERGON) 324 MG tablet TAKE 1 TABLET(324 MG) BY MOUTH DAILY WITH BREAKFAST 90 tablet 0     folic acid (FOLVITE) 1 MG tablet Take 1 mg by mouth daily.             generic lancets Test one time daily. Dispense brand per patient's insurance at pharmacy discretion. 100 each 11     LACTOBACILLUS ACIDOPHILUS (PROBIOTIC ORAL) Take 1 tablet by mouth daily.        lancets (ONETOUCH DELICA LANCETS) 33 gauge Misc Test twice daily 200 each 2     lisinopriL (PRINIVIL,ZESTRIL) 10 MG tablet TAKE 1 TABLET(10 MG) BY MOUTH DAILY 90 tablet 1     nystatin (MYCOSTATIN) cream Perineum as needed 30 g 0     omeprazole (PRILOSEC) 20 MG capsule TAKE 1 CAPSULE(20 MG) BY MOUTH DAILY BEFORE BREAKFAST 90 capsule 3     PARoxetine (PAXIL) 20 MG tablet Take 1 tablet (20 mg total) by mouth daily. 90 tablet 3     pioglitazone (ACTOS) 15 MG tablet TAKE 1 TABLET(15 MG) BY MOUTH DAILY 90 tablet 1     Current Facility-Administered Medications   Medication Dose Route Frequency Provider Last Rate Last Dose     denosumab 60 mg (PROLIA 60 mg/ml)  60 mg Subcutaneous Q6 Months Nadia Atkins, NP   60 mg at 06/07/19 1301     denosumab 60 mg (PROLIA 60 mg/ml)  60 mg Subcutaneous Q6 Months Nadia Atkins, NP   60 mg at 07/16/20 1120       TOBACCO USE:  Social History     Tobacco Use   Smoking Status Never Smoker   Smokeless Tobacco Never Used       VITALS:  Vitals:    09/29/20 1538   BP: 118/68   Pulse: 84   Weight: 145 lb (65.8 kg)     Wt Readings from Last 3 Encounters:   09/29/20 145 lb (65.8 kg)   01/23/20 135 lb (61.2 kg)   01/23/20 135 lb (61.2 kg)         PHYSICAL EXAM:  Constitutional:  Reveals an alert, pleasant female.   HEET: Normocephalic, without obvious abnormality, atraumatic.   Neurologic: Normal gait and station  Psychologic: Normal affect

## 2021-06-11 NOTE — PROGRESS NOTES
[facility] Daily Progress     Patient Name: Silvia Avila  Date: 2017  Visit #11 ( )  Referral Diagnosis: lumbago  Referring provider: Sheri Avila MD  Visit Diagnosis:     ICD-10-CM    1. Right-sided low back pain without sciatica, unspecified chronicity M54.5    2. Pain of right sacroiliac joint M53.3    3. History of lumbar fusion Z98.890    4. Mixed hyperlipidemia E78.2    5. Hypertension I10    6. Osteoporosis M81.0    7. Type 2 diabetes mellitus E11.9    8. Malignant neoplasm of right breast C50.911          Assessment:     Patient demonstrates understanding/independence with home program.  Response to Intervention good  Patient is benefitting from skilled physical therapy and is making steady progress toward functional goals.  Patient is appropriate to continue with skilled physical therapy intervention, as indicated by initial plan of care.    Goal Status:  Pt. will demonstrate/verbalize independence in self-management of condition in : 12 weeks;Partially Met  Pt. will be independent with home exercise program in : 12 weeks;Partially met  Pt. will bend: to clean;to dress;to do yard work;with less pain;with less difficulty;in 12 weeks;Comment;Progressing toward  Comment:: wiht minimal increase in pain  status: 1/10 pain increase with bending activities  status: increases by 2/10 with bending  Patient will sit: 60 minutes;with less pain;with less difficultty;in 12 weeks;Comment;Met  Comment: with minimal increase in pain    Pt will: tolerate yard work for 30 min with minimal pain increase in 12 weeks.   status: mostly met. tolerance is 30 min    Plan / Patient Education:     Continue with initial plan of care.  Progress with home program as tolerated.   Patient is scheduled for 1 more visit.  Will discuss POC next visit, possibly follow up in a couple weeks or hold chart for 30 days.     Subjective:     Pain Ratin  Doing pretty well. Back didn't bother her much over the  weekend. Noticed some buttock pain when walking uphill yesterday. Has some mild discomfort today.       Objective:     Decreased tenderness of psoas compared to last visit.  Mild/mod tenderness of (R) SI joint and associated structures. Mild fascial restrictions of cecum.     Treatment Today     TREATMENT MINUTES COMMENTS   Evaluation     Self-care/ Home management     Manual therapy 55 Induction, indirect, direct techniques utilized as appropriate for optimal tissue release.   MFR/SCS - pelvic diaphragm, CECM-V, cecum, (R) psoas, supine LS traction, (R) UPL5, (R) LPL5, (R) sacrotub lig, sacral release   Neuromuscular Re-education     Therapeutic Activity     Therapeutic Exercises     Gait training     Modality__________________                Total 55    Blank areas are intentional and mean the treatment did not include these items.       Mary Marsh  6/27/2017

## 2021-06-11 NOTE — PROGRESS NOTES
[facility] Daily Progress     Patient Name: Silvia Avila  Date: 2017  Visit #12 ( )  Referral Diagnosis: lumbago  Referring provider: Sheri Avila MD  Visit Diagnosis:     ICD-10-CM    1. Right-sided low back pain without sciatica, unspecified chronicity M54.5    2. Pain of right sacroiliac joint M53.3    3. History of lumbar fusion Z98.890    4. Mixed hyperlipidemia E78.2    5. Hypertension I10    6. Osteoporosis M81.0    7. Type 2 diabetes mellitus E11.9    8. Malignant neoplasm of right breast C50.911          Assessment:     HEP/POC compliance is  good .  Patient demonstrates understanding/independence with home program.  Response to Intervention good  Patient is benefitting from skilled physical therapy and is making steady progress toward functional goals.    Goal Status:  Pt. will demonstrate/verbalize independence in self-management of condition in : 12 weeks;Partially Met  Pt. will be independent with home exercise program in : 12 weeks;Partially met  Pt. will bend: to clean;to dress;to do yard work;with less pain;with less difficulty;in 12 weeks;Comment;Progressing toward  Comment:: wiht minimal increase in pain  status: 1/10 pain increase with bending activities  status: increases to 2/10 with bending  Patient will sit: 60 minutes;with less pain;with less difficultty;in 12 weeks;Comment;Met  Comment: with minimal increase in pain    Pt will: tolerate yard work for 30 min with minimal pain increase in 12 weeks.   status: mostly met. tolerance is 30 min    Plan / Patient Education:     Continue with initial plan of care.  Progress with home program as tolerated.  follow up in 2 weeks.    Subjective:     Pain Ratin-10/10  Has done pretty well this week.  Had some increased pain yesterday and did some self massage. Tries to be aware of limiting bending activities, but finds she does that a lot.  She generally avoids heavy lifting.      Objective:     Mod tenderness of (R)  upper SI jt.   Mod fascial restrictions noted in SI jt and associated lig.     Treatment Today     TREATMENT MINUTES COMMENTS   Evaluation     Self-care/ Home management     Manual therapy 30 Induction, indirect, direct techniques utilized as appropriate for optimal tissue release.   MFR/SCS - prone LS traction, (R) UPL5, (R) LPL5, (R) sacrotub lig, (R) HFO-SI, sacral release   Neuromuscular Re-education     Therapeutic Activity     Therapeutic Exercises     Gait training     Modality__________________                Total 30    Blank areas are intentional and mean the treatment did not include these items.       Mary Marsh  6/30/2017

## 2021-06-11 NOTE — PROGRESS NOTES
[facility] Daily Progress     Patient Name: Silvia Avila  Date: 2017  Visit #5  Referral Diagnosis: lumbago  Referring provider: Sheri Avila MD  Visit Diagnosis:     ICD-10-CM    1. Right-sided low back pain without sciatica, unspecified chronicity M54.5    2. Pain of right sacroiliac joint M53.3    3. History of lumbar fusion Z98.890    4. Hypertension I10    5. Mixed hyperlipidemia E78.2    6. Osteoporosis M81.0    7. Type 2 diabetes mellitus E11.9    8. Malignant neoplasm of right breast C50.911    9. Status post lumbar spinal fusion Z98.1          Assessment:     HEP/POC compliance is  good .  Patient demonstrates understanding/independence with home program.  Patient is benefitting from skilled physical therapy and is making steady progress toward functional goals.    Goal Status:  Pt. will demonstrate/verbalize independence in self-management of condition in : 4 weeks;12 weeks;Progressing toward  Pt. will be independent with home exercise program in : 12 weeks;Progressing toward  Pt. will bend: to clean;to dress;to do yard work;with less pain;with less difficulty;in 12 weeks;Comment;Progressing toward  Comment:: wiht minimal increase in pain  status: 1/10 pain increase with bending activities  Patient will sit: 60 minutes;with less pain;with less difficultty;in 12 weeks;Comment;Met  Comment: with minimal increase in pain  Pt will: tolerate yard work for 30 min with minimal pain increase in 12 weeks.   status: tolerance is 20-30 min    Plan / Patient Education:     Continue with initial plan of care.  Progress with home program as tolerated.    Subjective:     Pain Ratin  Was more painful after the last session. Pain eventually subsided, now is a little better than before.  See goal section for functional status.       Objective:     (R) PSIS post in prone.   (R) FB test (+).    Treatment Today     TREATMENT MINUTES COMMENTS   Evaluation     Self-care/ Home management     Manual  therapy 30 Induction, indirect, direct techniques utilized as appropriate for optimal tissue release.   MFR/SCS - prone LS traction, (R) UPL5, (R) LPL5, (R) hemipelvis, iliac gap   Neuromuscular Re-education     Therapeutic Activity     Therapeutic Exercises     Gait training     Modality__________________                Total 30    Blank areas are intentional and mean the treatment did not include these items.       Mary Marsh  6/6/2017

## 2021-06-11 NOTE — PROGRESS NOTES
[facility] Daily Progress     Patient Name: Silvia Avila  Date: 2017  Visit #4  Referral Diagnosis: lumbago  Referring provider: Sheri Avila MD  Visit Diagnosis:     ICD-10-CM    1. Right-sided low back pain without sciatica, unspecified chronicity M54.5    2. Pain of right sacroiliac joint M53.3    3. History of lumbar fusion Z98.890    4. Hypertension I10    5. Mixed hyperlipidemia E78.2    6. Osteoporosis M81.0    7. Type 2 diabetes mellitus E11.9    8. Malignant neoplasm of right breast C50.911    9. Status post lumbar spinal fusion Z98.1          Assessment:     Patient is benefitting from skilled physical therapy and is making steady progress toward functional goals.  Patient is appropriate to continue with skilled physical therapy intervention, as indicated by initial plan of care.    Goal Status:  Pt. will demonstrate/verbalize independence in self-management of condition in : 4 weeks;12 weeks  Pt. will be independent with home exercise program in : 12 weeks  Pt. will bend: to clean;to dress;to do yard work;with less pain;with less difficulty;in 12 weeks;Comment  Comment:: wiht minimal increase in pain  Patient will sit: 60 minutes;with less pain;with less difficultty;in 12 weeks;Comment  Comment: with minimal increase in pain  Pt will: tolerate yard work for 30 min with minimal pain increase in 12 weeks.     Plan / Patient Education:     Continue with initial plan of care.  Progress with home program as tolerated.    Subjective:     Pain Ratin  Doing about the same.  Sx are intermittent, still localized to (R) SI joint area.  Worse with gardening. She limits her time with gardening, takes breaks and stops if pain increases.  No changes in sx without taping this time.       Objective:     (L) NOEL/FADIR WNL.  Mild tightness (R) NOEL, (R) FADIR WFL.    Tenderness of (R) SI jt and related structures.     Treatment Today     TREATMENT MINUTES COMMENTS   Evaluation     Self-care/  Home management     Manual therapy 50 Induction, indirect, direct techniques utilized as appropriate for optimal tissue release.   MFR/SCS - (R) UPL5, (R) LPL5, prone LS traction, iliac gap, CECM-V, (R) Pir,  ICV-V, mes root, sm intestine    Neuromuscular Re-education     Therapeutic Activity     Therapeutic Exercises 8 Exercises per flow sheet. Added piriformis stretches   Gait training     Modality__________________                Total 58    Blank areas are intentional and mean the treatment did not include these items.       Mayr Marsh  6/2/2017

## 2021-06-12 NOTE — PROGRESS NOTES
[facility] Discharge Summary  Patient Name: Silvia Avila  Date: 8/14/2017  Referral Diagnosis: lumbago  Referring provider: Sheri Avila MD  Visit Diagnosis:   1. Right-sided low back pain without sciatica, unspecified chronicity     2. Pain of right sacroiliac joint     3. History of lumbar fusion     4. Mixed hyperlipidemia     5. Hypertension     6. Osteoporosis     7. Type 2 diabetes mellitus     8. Malignant neoplasm of right breast         Goals:  Pt. will demonstrate/verbalize independence in self-management of condition in : 12 weeks;Met  Pt. will be independent with home exercise program in : 12 weeks;Met  Pt. will bend: to clean;to dress;to do yard work;with less pain;with less difficulty;in 12 weeks;Comment;Partially met  Comment:: wiht minimal increase in pain  status: 1/10 pain increase with bending activities    Patient will sit: 60 minutes;with less pain;with less difficultty;in 12 weeks;Comment;Met  Comment: with minimal increase in pain    Pt will: tolerate yard work for 30 min with minimal pain increase in 12 weeks.   status: mostly met. tolerance is 30 min    Patient was seen for 13 visits from 5/19/17 to 7/21/17 with no missed appointments.  The patient met goals and has demonstrated understanding of and independence in the home program for self-care, and progression to next steps.  She will initiate contact if questions or concerns arise.  Patient received a home program for ROM, strength, stabilization.   No further therapy is required at this time.    Therapy will be discontinued at this time.  The patient will need a new referral to resume.    Thank you for your referral.  Mary Marsh  8/14/2017  3:10 PM    [facility] Daily Progress     Patient Name: Silvia Avila  Date: 7/21/2017  Visit #13 (13/20 )  Referral Diagnosis: lumbago  Referring provider: Sheri Avila MD  Visit Diagnosis:     ICD-10-CM    1. Right-sided low back pain without sciatica,  "unspecified chronicity M54.5    2. Pain of right sacroiliac joint M53.3    3. History of lumbar fusion Z98.890    4. Mixed hyperlipidemia E78.2    5. Hypertension I10    6. Osteoporosis M81.0    7. Type 2 diabetes mellitus E11.9    8. Malignant neoplasm of right breast C50.911          Assessment:     HEP/POC compliance is  good .  Patient demonstrates understanding/independence with home program.  Response to Intervention good  Patient is benefitting from skilled physical therapy and is making steady progress toward functional goals.  Patient is appropriate to continue with skilled physical therapy intervention, as indicated by initial plan of care.    Goal Status:  Pt. will demonstrate/verbalize independence in self-management of condition in : 12 weeks;Met  Pt. will be independent with home exercise program in : 12 weeks;Met  Pt. will bend: to clean;to dress;to do yard work;with less pain;with less difficulty;in 12 weeks;Comment;Partially met  Comment:: wiht minimal increase in pain  status: 1/10 pain increase with bending activities    Patient will sit: 60 minutes;with less pain;with less difficultty;in 12 weeks;Comment;Met  Comment: with minimal increase in pain    Pt will: tolerate yard work for 30 min with minimal pain increase in 12 weeks.   status: mostly met. tolerance is 30 min    Plan / Patient Education:     No further PT scheduled.  Pt will schedule PRN over the next month.  DC in 30 days if she doesn't return.     Subjective:     Pain Ratin  Returns after about 3 weeks. Intermittent pain in the (R) lat pelvis/hip. She continues to be cautious with lifting, bending. \" I know my limitations\".  Exercises are going fine. Hasn't been going to the gym lately, but has been walking the dog, doing home exercises including abd/glut sets.   She reports significant improvement since the start of care, about 90% better. Minimal functional complaints. She is able to modify activities and her spouse helps when " needed.     Modified Oswestry Low Back Pain Disablity Questionnaire  in %: 22      Objective:     Lumbar ROM:  Date: 7/21/17     *Indicate scale AROM AROM AROM   Lumbar Flexion 90-95%     Lumbar Extension 60%      Right Left Right Left Right Left   Lumbar Sidebending         Lumbar Rotation         Thoracic Flexion      Thoracic Extension      Thoracic Sidebending         Thoracic Rotation           Palpation: mild/mod tenderness of (R) SI joint.       Treatment Today     TREATMENT MINUTES COMMENTS   Evaluation     Self-care/ Home management     Manual therapy 40 Induction, indirect, direct techniques utilized as appropriate for optimal tissue release.   MFR/SCS - prone LS traction, (R) UPL5, (R) LPL5, (R) sacrotub lig, sacral release, (R) piriformis release,    Neuromuscular Re-education     Therapeutic Activity     Therapeutic Exercises 15 Reassessed goals, functional status, objective measures, outcome measures.    Gait training     Modality__________________                Total 55    Blank areas are intentional and mean the treatment did not include these items.       Mary Marsh  7/21/2017

## 2021-06-13 NOTE — PROGRESS NOTES
[facility]  ENDOCRINOLOGY    Osteoporosis Follow Up 10/28/2017    Silvia Avila, 1935, [id number]          Reason for visit      1. Osteoporosis        History     Silvia Avila is a very pleasant 82 y.o. old female who presents for follow up.   SUMMARY:  Silvia Avila did have a breast cancer in 1997 treated with surgery radiation chemotherapy and she has been on Evista since that time.  She then had a follow-up bone density test done in June 2014 and at that time it showed that she was significantly below baseline and trabecular bone score was poor.  Her fracture risk was calculated to be high and in view of her decline on raloxifene. She is here to consider alternative treatment. She has a history of significant GI distress and requiring daily H2 blocker use and her calculated fracture risk would be considered high at this point.  I believe in the context of her current bone density including the low trabecular bone score she would be a candidate for denosumab.  We had a lengthy discussion about this and she is in agreement.  TODAY:  Silvia is here today as a MICHAEL from Dr Granados.  Unfortunately, she got lost in the shuffle of 's [facility], and has been without treatment for a year.  She got her last Prolia injection in 2016.  She is due for a Dexa Scan. Her Calcium level is currently 10.1, and her Vit D level is 57.1.      Past Medical History     Patient Active Problem List   Diagnosis     Eczema     Osteoporosis     Anemia     Type 2 diabetes mellitus     Esophageal reflux     Anxiety     Hypertension     Mixed hyperlipidemia     Lumbar radiculopathy     IgA monoclonal gammopathy of uncertain significance     Malignant neoplasm of right breast       Family History       family history includes No Medical Problems in her father and mother.    Social History      reports that she has never smoked. She has never used smokeless tobacco. She reports that she drinks about 1.2 oz of alcohol per week   "She reports that she does not use illicit drugs.      Review of Systems     Patient denies current pain, limited mobility, fractures.   Remainder per HPI.      Vital Signs     /62 (Patient Site: Right Arm, Patient Position: Sitting, Cuff Size: Adult Regular)  Pulse 78  Ht 4' 11.5\" (1.511 m)  Wt 138 lb 6.4 oz (62.8 kg)  BMI 27.49 kg/m2    Physical Exam     GENERAL:  Normal, NIRD  EYES:  Pupils equal, round and reactive to light; no proptosis, lid lag or  periorbital edema.  THYROID:  Thyroid is normal.  No tenderness or bruit  NECK: No lymph nodes  MUSCULOSKELETAL: No joint abnormalities, FROM in all four extremities. No kyphosis. Muscle strength grossly normal without evidence of wasting.  HEART:  Regular rate and rhythm without murmur.  LUNGS:  Clear to auscultation.  ABDOMEN:Soft, non-tender, no masses or organomegaly  NEURO:  Patella Reflexes were normal.No tremors  SKIN:  No acanthosis nigricans or vitiligo        Assessment     1. Osteoporosis        Plan     Silvia will get her Dexa Scan done, and we will talk to each other after the results are obtained.  Should we conclude that she needs to continue on the Prolia injections, we will start a PA and once that is obtained, we will restart the Prolia injections.      Total visit minutes:30  Time spent counseling and coordination of care:25    Nadia STEPHENS Endocrinology  10/28/2017  11:13 AM        Current Medications     Outpatient Medications Prior to Visit   Medication Sig Dispense Refill     ammonium lactate (AMLACTIN) 12 % cream APPLY PRN TO HANDS D  10     aspirin 81 mg chewable tablet Chew 81 mg daily.       atorvastatin (LIPITOR) 40 MG tablet Take 1 tablet (40 mg total) by mouth at bedtime. 90 tablet 3     blood glucose test (ONETOUCH ULTRA TEST) strips Dispense brand per patient's insurance at pharmacy discretion. 200 strip 2     CALCITRATE 200 mg (950 mg) tablet TAKE 1 TABLET BY MOUTH TWICE DAILY (Patient taking differently: TAKE 1 " TABLET BY MOUTH DAILY) 200 tablet 3     cholecalciferol, vitamin D3, (VITAMIN D3) 2,000 unit cap Take 1 capsule by mouth daily.       clobetasol (TEMOVATE) 0.05 % cream Apply 1 application topically 2 (two) times a day as needed. Apply daily as needed for eczema       docusate sodium (COLACE) 100 MG capsule Take 100 mg by mouth 2 (two) times a day.       ferrous gluconate (FERGON) 324 MG tablet Take 1 tablet (324 mg total) by mouth daily with breakfast. 90 tablet 3     fluocinonide (LIDEX) 0.05 % external solution Apply 1 application topically daily as needed. Apply to scalp       fluticasone (FLONASE) 50 mcg/actuation nasal spray INHALE 2 SPRAYS IN EACH NOSTRIL DAILY 16 g 1     folic acid (FOLVITE) 1 MG tablet Take 400 mcg by mouth daily.        LACTOBACILLUS ACIDOPHILUS (PROBIOTIC ORAL) Take 1 tablet by mouth daily.        lancets (ONETOUCH DELICA LANCETS) 33 gauge Misc Test twice daily 200 each 2     lisinopril (PRINIVIL,ZESTRIL) 10 MG tablet TAKE 1 TABLET BY MOUTH EVERY DAY 90 tablet 2     metFORMIN (GLUCOPHAGE) 500 MG tablet Take 1 tablet (500 mg total) by mouth 2 (two) times a day with meals. 180 tablet 1     nystatin (MYCOSTATIN) cream Apply 1 application topically as needed for dry skin. Perineum as needed       omeprazole (PRILOSEC) 20 MG capsule Take 1 capsule (20 mg total) by mouth daily. 90 capsule 0     PARoxetine (PAXIL) 20 MG tablet Take 1 tablet (20 mg total) by mouth daily. 90 tablet 0     blood glucose test (GLUCOSE BLOOD) strips One test strip three times a day 100 strip 3     clobetasol (TEMOVATE) 0.05 % external solution APPLY TO SCALP RASH AS NEEDED  10     lancets Misc Use As Directed.       VITAMIN D2 50,000 unit capsule   4     Facility-Administered Medications Prior to Visit   Medication Dose Route Frequency Provider Last Rate Last Dose     denosumab 60 mg (PROLIA 60 mg/ml)  60 mg Subcutaneous Q6 Months Ilene Granados MD   60 mg at 07/28/16 1132         Lab Results     PTH   Date  Value Ref Range Status   01/21/2015 44 10 - 86 pg/mL Final     Calcium   Date Value Ref Range Status   10/23/2017 10.1 8.5 - 10.5 mg/dL Final           Imaging Results   Last DEXA scan:  Results for orders placed in visit on 12/08/15   DXA Bone Density Scan    Narrative 12/9/2015      RE: Silvia Avila  YOB: 1935      Dear Dr. Mark Rizo,    Bone densitometry was performed on your patient using our [employer]   densitometer. The results are summarized and a copy of the actual scans   are included for your review. In conformity with the International Society   of Clinical Densitometry's most recent position statement for DXA   interpretation (2013), the diagnosis will be made on the lowest measured   T-score of the lumbar spine, femoral neck, total proximal femur or 33%   radius. Note the change in terminology for diagnostic classification from   OSTEOPENIA to LOW BONE MASS. All trending for sequential exams will be   done using multiple vertebrae or the total proximal femur. If additional   information is needed or if you would like to discuss the results, please   do not hesitate to call me.     Patient Profile:  This 80-year-old woman is here for follow-up bone density testing.  She is   a breast cancer survivor since 1977.  She did use postmenopausal hormone   therapy about 5 years in the past.  She has had a spinal fusions at L4-5   and she is a type II diabetic.  She has no personal history of fracture   parental history of hip fracture.  Her diet is at least 1500 mg a day of   calcium-containing foods and she supplements with 600 mg of calcium and   2000 IUs of vitamin D daily with 50,000 IUs of vitamin D weekly.  She was   treated with denosumab with her last injection being February 2015.    Conclusions:  1. Bone density of the lumbar spine specimen at L1 and L2 with a T score   is -1.5 and she is increased 4.6% since her previous exam but there is   some sclerotic artifact on the  spine  2. Femur bone density show femoral neck T score -1.5 on the left -1.7 on   the right and these are not significantly changed  3. Trabecular bone score indicates moderate quality trabecular bone   microarchitecture  4. 80-year-old woman with low bone density at this time.  Suggest close   monitoring.    Thank you for referring this patient to [facility] Osteoporosis [facility].   We are happy to be of service in support of you and your practice. If you   have any questions or suggestions to improve our service, please call me   at [phone].     Sincerely,     Ilene Granados M.D. RICCI.  Director, Osteoporosis Services, [facility]

## 2021-06-13 NOTE — TELEPHONE ENCOUNTER
Refill Approved    Rx renewed per Medication Renewal Policy. Medication was last renewed on 8/28/19.    Deepali Zaragoza, [facility] Triage/Med Refill 11/23/2020     Requested Prescriptions   Pending Prescriptions Disp Refills     PARoxetine (PAXIL) 20 MG tablet 90 tablet 3     Sig: Take 1 tablet (20 mg total) by mouth daily.       SSRI Refill Protocol  Passed - 11/23/2020 10:12 AM        Passed - PCP or prescribing provider visit in last year     Last office visit with prescriber/PCP: 9/29/2020 Chuy Avila MD OR same dept: 9/29/2020 Chuy Avila MD OR same specialty: 9/29/2020 Chuy Avila MD  Last physical: 1/23/2020 Last MTM visit: Visit date not found   Next visit within 3 mo: Visit date not found  Next physical within 3 mo: Visit date not found  Prescriber OR PCP: Chuy Avila MD  Last diagnosis associated with med order: 1. Anxiety state  - PARoxetine (PAXIL) 20 MG tablet; Take 1 tablet (20 mg total) by mouth daily.  Dispense: 90 tablet; Refill: 3    If protocol passes may refill for 12 months if within 3 months of last provider visit (or a total of 15 months).

## 2021-06-13 NOTE — PROGRESS NOTES
"Clinic Note    Assessment:     Assessment and Plan:    1. Sinusitis    She has had sinus tenderness for the past week that is not getting better, patient actually reports that it is getting worse. Her lungs sound clear and she has normal vital signs. I think that her cough is due to a post nasal drip. I told her that we would treat her with Augmentin for bacterial sinusitis but told her that if she is not starting to feel better by the end of the week, she needs to come back to see me. See instructions below.      Patient Instructions   Take one pill of the Augmentin every 12 hours for the next seven days.     Use your netti-pot twice per day, once in the morning, and once at night.     Keep using your flonase.     Use Nyquil at night, and Dayquil during the day.     If you are not starting to feel better by the end of the week, come back to see me.                Subjective:      Silvia Avila is a 82 y.o. female who comes to the clinic with a cough    She says that her cough started about six days ago. Cough started as try, but now is more productive. She is also having some body aches. She denies having any fevers. No nausea vomiting or diarrhea. Having occasional SOB. No chest pain. Having lots of nasal congestion. She denies sinus congestion but says that her \"eyes feel full.\" No sore throat. Denies smoking. No asthma.     The following portions of the patient's history were reviewed and updated as appropriate: Allergies, medications, problem List.     Review of Systems:    Review is negative except for what is mentioned above.     Social Hx:    History   Smoking Status     Never Smoker   Smokeless Tobacco     Never Used         Objective:     Vitals:    10/17/17 1604   BP: 120/72   Patient Site: Right Arm   Patient Position: Sitting   Cuff Size: Adult Regular   Pulse: 95   Temp: 98.3  F (36.8  C)   TempSrc: Oral   SpO2: 96%   Weight: 139 lb (63 kg)       Exam:    General: No apparent distress. Calm. Alert " and Oriented X3. Pt behavior is appropriate.  Head:Atraumatic. Normocephalic, maxillary sinus tenderness with palpation   Neck: Supple. No JVD. Full ROM. Some cervical adenopathy on the right side.   Eyes: PERRL, No discharge. No strabismus. No nystagmus.  Ears: TMs pearly gray with landmarks visible.   Nose/Mouth/Throat: Patent nares, no oral lesions,post nasal drip present. Uvula mid-line. Nasal septum mid-line. Clear turbinates.   Lymph: No axillar or cervical adenopathy.   Chest/Lungs: Normal chest wall, clear to auscultation, normal respiratory effort and rate.   Heart/Pulses: Regular rate and rhythm, strong and equal radial pulses, no murmurs. Capillary refill <2 seconds. No edema.   Musculoskeletal: No CVA tenderness with palpation. Good ROM with extremities.   Neurologic: Interactive, alert, no focal findings, CNs intact.   Skin: Warm, dry. Normal hair pattern. Free of lesions. Normal skin turgor.       Patient Active Problem List   Diagnosis     Eczema     Osteoporosis     Anemia     Type 2 diabetes mellitus     Esophageal reflux     Anxiety     Hypertension     Mixed hyperlipidemia     Lumbar radiculopathy     IgA monoclonal gammopathy of uncertain significance     Malignant neoplasm of right breast     Current Outpatient Prescriptions   Medication Sig Dispense Refill     ammonium lactate (AMLACTIN) 12 % cream APPLY PRN TO HANDS D  10     aspirin 81 mg chewable tablet Chew 81 mg daily.       atorvastatin (LIPITOR) 40 MG tablet Take 1 tablet (40 mg total) by mouth at bedtime. 90 tablet 3     blood glucose test (GLUCOSE BLOOD) strips One test strip three times a day 100 strip 3     blood glucose test (ONETOUCH ULTRA TEST) strips Dispense brand per patient's insurance at pharmacy discretion. 200 strip 2     CALCITRATE 200 mg (950 mg) tablet TAKE 1 TABLET BY MOUTH TWICE DAILY 200 tablet 3     cholecalciferol, vitamin D3, (VITAMIN D3) 2,000 unit cap Take 1 capsule by mouth daily.       clobetasol (TEMOVATE)  0.05 % cream Apply 1 application topically 2 (two) times a day as needed. Apply daily as needed for eczema       clobetasol (TEMOVATE) 0.05 % external solution APPLY TO SCALP RASH AS NEEDED  10     docusate sodium (COLACE) 100 MG capsule Take 100 mg by mouth 2 (two) times a day.       ferrous gluconate (FERGON) 324 MG tablet Take 1 tablet (324 mg total) by mouth daily with breakfast. 90 tablet 3     fluocinonide (LIDEX) 0.05 % external solution Apply 1 application topically daily as needed. Apply to scalp       fluticasone (FLONASE) 50 mcg/actuation nasal spray INHALE 2 SPRAYS IN EACH NOSTRIL DAILY 16 g 1     folic acid (FOLVITE) 1 MG tablet Take 400 mcg by mouth daily.        LACTOBACILLUS ACIDOPHILUS (PROBIOTIC ORAL) Take 1 tablet by mouth daily.        lancets (ONETOUCH DELICA LANCETS) 33 gauge Misc Test twice daily 200 each 2     lancets Misc Use As Directed.       lisinopril (PRINIVIL,ZESTRIL) 10 MG tablet TAKE 1 TABLET BY MOUTH EVERY DAY 90 tablet 2     metFORMIN (GLUCOPHAGE) 500 MG tablet Take 1 tablet (500 mg total) by mouth 2 (two) times a day with meals. 180 tablet 1     nystatin (MYCOSTATIN) cream Apply 1 application topically as needed for dry skin. Perineum as needed       omeprazole (PRILOSEC) 20 MG capsule Take 1 capsule (20 mg total) by mouth daily. 90 capsule 0     PARoxetine (PAXIL) 20 MG tablet Take 1 tablet (20 mg total) by mouth daily. 90 tablet 0     VITAMIN D2 50,000 unit capsule   4     Current Facility-Administered Medications   Medication Dose Route Frequency Provider Last Rate Last Dose     denosumab 60 mg (PROLIA 60 mg/ml)  60 mg Subcutaneous Q6 Months Ilene Granados MD   60 mg at 07/28/16 1132       Total time spent with patient was 20 minutes with >50% of time spent in face-to-face counseling regarding the above plan       Carlos Del Valle CNP (Rob)    10/17/2017

## 2021-06-13 NOTE — PROGRESS NOTES
ASSESSMENT and PLAN:  1. Health care maintenance  She'll get her flu shot today.  - Influenza High Dose, Seasonal 65+ yrs    2. Hypertension  Well controlled on 10mg of lisinopril.  - Basic Metabolic Panel    3. Mixed hyperlipidemia  She's doing well on an increased dose of atrovastatin (20mg).  She's not fasting today, but we'll recheck her labs.  - Lipid Cascade    4. Type 2 diabetes mellitus  I anticipate her A1c will be elevated.  We discussed increasing her metformin to bid.  - Microalbumin, Random Urine  - Glycosylated Hemoglobin A1c    5. Osteoporosis  She's due for her repeat dexa scan and I'm going to refer her to endocrine for tx of care for her prolia.  - DXA Bone Density Scan; Future  - Ambulatory referral to Endocrinology    6. Mechanical pain of left foot  This is bothering her  We discussed the possiblity of hahn neuroma.  I'm going to have her see podiatry.  - Ambulatory referral to Podiatry    7. Screening mammogram, encounter for  She'd like to continue screeing.  - Mammo Screening Bilateral; Future    Medications Discontinued During This Encounter   Medication Reason     metFORMIN (GLUCOPHAGE) 500 MG tablet Reorder     atorvastatin (LIPITOR) 10 MG tablet        Return in about 6 months (around 4/3/2018).    Patient Instructions   Today's labs will be available on [employer].  If you aren't signed up, then we'll mail them out.  If anything needs more immediate follow up, we'll also call.    [phone] D[facility]    Endocrine:  [phone]    Please set up your mammogram:  [phone]    [facility]  PHONE: [phone]  PROVIDERS: Yash Stevens DPM; Quinn Alcantar DPM    Take care!      CHIEF COMPLAINT:  Chief Complaint   Patient presents with     Diabetes     non fasting diabetic check       HISTORY OF PRESENT ILLNESS:  Silvia Avila is a 82 y.o. female  presenting to the clinic today for follow up of her DM2.  She's been well managed on low dose metformin 500mg Qday.  She has no  hypos.  She checks her sugars Qam and they run in the 130s-180s.  She has no ill SEs of the metformin and is open to an increase if needed.  She thinks she probably just eats too much and that's part of why her AM sugars are elevated.    She has hyperlipidemia.  At the last visit we checked her cholesterol and elected to increase her atorvastatin to 20mg.  She's had no difficulty w/ that.  She's not fasting today.    She has a pain in the bottom of her left foot.  It feels like she's walking on a ball when she's barefoot.  It bothers her.     She has osteoporosis.  Her last DEXA was in 2015.  She had two prolia shots and was seeing Dr. Granados.  She thinks her last shot was about one year ago.  She has not re-established care for her osteoporosis.  She did well w/ prolia when she was on it.     REVIEW OF SYSTEMS:    All other systems are negative.    Family History   Problem Relation Age of Onset     No Medical Problems Mother      No Medical Problems Father        TOBACCO USE:  History   Smoking Status     Never Smoker   Smokeless Tobacco     Never Used       VITALS:  Vitals:    10/03/17 1452   BP: 112/64   Patient Site: Right Arm   Patient Position: Sitting   Cuff Size: Adult Regular   Pulse: 88   Weight: 137 lb (62.1 kg)   Height: 5' (1.524 m)     Wt Readings from Last 3 Encounters:   10/03/17 137 lb (62.1 kg)   05/11/17 139 lb (63 kg)   11/17/16 138 lb (62.6 kg)         PHYSICAL EXAM:  Constitutional:  Reveals an alert, pleasant adult female.   Vitals:  Noted.   Lungs: Clear to auscultation bilaterally, respirations unlabored.   Heart: Regular rate and rhythm, S1 and S2 normal, no murmur, rub, or gallop,   Abdomen: Soft, non-tender, bowel sounds active, no masses, no organomegaly   Extremities: Extremities normal, atraumatic, no cyanosis or edema, some tenderness w/ palpation of the plantar aspect of her left forefoot  Skin: Skin color, texture, turgor normal, no rashes or lesions     QUALITY MEASURES:  The  following high BMI interventions were performed this visit: weight monitoring    MEDICATIONS:  Current Outpatient Prescriptions   Medication Sig Dispense Refill     ammonium lactate (AMLACTIN) 12 % cream APPLY PRN TO HANDS D  10     aspirin 81 mg chewable tablet Chew 81 mg daily.       atorvastatin (LIPITOR) 20 MG tablet Take 1 tablet (20 mg total) by mouth at bedtime. 90 tablet 3     blood glucose test (GLUCOSE BLOOD) strips One test strip three times a day 100 strip 3     blood glucose test (ONETOUCH ULTRA TEST) strips Dispense brand per patient's insurance at pharmacy discretion. 200 strip 2     CALCITRATE 200 mg (950 mg) tablet TAKE 1 TABLET BY MOUTH TWICE DAILY 200 tablet 3     cholecalciferol, vitamin D3, (VITAMIN D3) 2,000 unit cap Take 1 capsule by mouth daily.       clobetasol (TEMOVATE) 0.05 % cream Apply 1 application topically 2 (two) times a day as needed. Apply daily as needed for eczema       clobetasol (TEMOVATE) 0.05 % external solution APPLY TO SCALP RASH AS NEEDED  10     docusate sodium (COLACE) 100 MG capsule Take 100 mg by mouth 2 (two) times a day.       ferrous gluconate (FERGON) 324 MG tablet Take 1 tablet (324 mg total) by mouth daily with breakfast. 90 tablet 3     fluocinonide (LIDEX) 0.05 % external solution Apply 1 application topically daily as needed. Apply to scalp       fluticasone (FLONASE) 50 mcg/actuation nasal spray INHALE 2 SPRAYS IN EACH NOSTRIL DAILY 16 g 1     folic acid (FOLVITE) 1 MG tablet Take 400 mcg by mouth daily.        LACTOBACILLUS ACIDOPHILUS (PROBIOTIC ORAL) Take 1 tablet by mouth daily.        lancets (ONETOUCH DELICA LANCETS) 33 gauge Misc Test twice daily 200 each 2     lancets Misc Use As Directed.       lisinopril (PRINIVIL,ZESTRIL) 10 MG tablet TAKE 1 TABLET BY MOUTH EVERY DAY 90 tablet 2     metFORMIN (GLUCOPHAGE) 500 MG tablet Take 1 tablet (500 mg total) by mouth 2 (two) times a day with meals. 180 tablet 1     nystatin (MYCOSTATIN) cream Apply 1  application topically as needed for dry skin. Perineum as needed       omeprazole (PRILOSEC) 20 MG capsule Take 1 capsule (20 mg total) by mouth daily. 90 capsule 0     PARoxetine (PAXIL) 20 MG tablet Take 1 tablet (20 mg total) by mouth daily. 90 tablet 0     VITAMIN D2 50,000 unit capsule   4     Current Facility-Administered Medications   Medication Dose Route Frequency Provider Last Rate Last Dose     denosumab 60 mg (PROLIA 60 mg/ml)  60 mg Subcutaneous Q6 Months Ilene Granados MD   60 mg at 07/28/16 0142

## 2021-06-13 NOTE — PROGRESS NOTES
Admission History & Physical  Silvia Avila, 1935, [id number]    [facility]  Sheri Avila MD, [phone]    Extended Emergency Contact Information  Primary Emergency Contact: Pancho Avila   [facility]  Home Phone: [phone]  Mobile Phone: [phone]  Relation: Spouse  Secondary Emergency Contact: Xi Fitzgerald   [facility]  Home Phone: [phone]  Relation: Child     Assessment and Plan:   Assessment: Hallux abductovalgus left foot, neuroma fourth intermetatarsal space left foot  Plan: I have recommended surgical correction of the painful bunion deformity  Active Problems:    * No active hospital problems. *      Chief Complaint:  Painful bunion left foot.  Numbness involving the toes left foot     HPI:    Silvia Avila is a 82 y.o. old female who presented to the clinic today complaining of a painful bunion involving her left foot.  The patient stated she has had this bunion for several years.  She does have pain particularly with certain type of shoes.  The pain is moderate and is only relieved after removing her shoe gear.  She also complained of some numbness involving the third fourth toe left foot.  She stated that she has no pain.  She is just concerned because she is diabetic and wanted to know if she was starting to have early signs of neuropathy.  History is provided by patient    Medical History  Active Ambulatory (Non-Hospital) Problems    Diagnosis     Malignant neoplasm of right breast     Lumbar radiculopathy     IgA monoclonal gammopathy of uncertain significance     Eczema     Osteoporosis     Anemia     Type 2 diabetes mellitus     Esophageal reflux     Anxiety     Hypertension     Mixed hyperlipidemia     Past Medical History:   Diagnosis Date     Anemia      Breast cancer 1997     Cancer      Diabetes mellitus type 2, diet-controlled      Hx antineoplastic chemotherapy      Hx of radiation therapy      Hyperlipidemia       Hypertension      IgA monoclonal gammopathy of uncertain significance      Low back pain      Patient Active Problem List    Diagnosis Date Noted     Malignant neoplasm of right breast      Lumbar radiculopathy 07/20/2015     IgA monoclonal gammopathy of uncertain significance      Eczema      Osteoporosis      Anemia      Type 2 diabetes mellitus      Esophageal reflux      Anxiety      Hypertension      Mixed hyperlipidemia      Surgical History  She  has a past surgical history that includes hemorrhoidectomy internal rubber band ligations; repair of perineum,non obstetrical; revise median n/carpal tunnel surg; remove armpit lymph nodes superfic; incise finger tendon sheath; Exploratory laparotomy (N/A); left lumpectomy with axillary node dissection (1997); Eye surgery (Bilateral); Eye surgery (Left); excise breast cyst (Left, 1997); and Breast biopsy (Left, 1997).   Past Surgical History:   Procedure Laterality Date     BREAST BIOPSY Left 1997     EXPLORATORY LAPAROTOMY N/A      EYE SURGERY Bilateral      EYE SURGERY Left     straightened out left eye, and bilateral eye surgery for entropion     left lumpectomy with axillary node dissection  1997     AR EXCISE BREAST CYST Left 1997    Description: Breast Surgery Lumpectomy;  Recorded: 11/28/2012;  Comments: left breast 1997     AR HEMORRHOIDECTOMY INTERNAL RUBBER BAND LIGATIONS      Description: Hemorrhoidectomy;  Recorded: 06/29/2012;     AR INCISE FINGER TENDON SHEATH      Description: Hand Incision Tendon Sheath Of A Finger;  Proc Date: 12/19/2009;  Comments: Left thumb Triger finger release By Dr. Murillo     AR REMOVE ARMPIT LYMPH NODES SUPERFIC      Description: Axillary Lymphadenectomy;  Recorded: 11/28/2012;  Comments: 1997     AR REPAIR OF PERINEUM,NON OBSTETRICAL      Description: Perineoplasty (Nonobstetrical);  Recorded: 06/29/2012;     AR REVISE MEDIAN N/CARPAL TUNNEL SURG      Description: Neuroplasty Decompression Median Nerve At Carpal Tunnel;   Recorded: 06/29/2012;    Social History  Reviewed, and she  reports that she has never smoked. She has never used smokeless tobacco. She reports that she drinks about 1.2 oz of alcohol per week  She reports that she does not use illicit drugs.  Social History   Substance Use Topics     Smoking status: Never Smoker     Smokeless tobacco: Never Used     Alcohol use 1.2 oz/week     2 Glasses of wine per week      Comment: occasional glass of wine      Allergies  Allergies   Allergen Reactions     Carrot Hives     Only raw. Tolerates cooked.     Carrot Hives    Family History  Reviewed, and family history includes No Medical Problems in her father and mother.   Psychosocial Needs  Social History     Social History Narrative     Additional psychosocial needs reviewed per nursing assessment.       Prior to Admission Medications     (Not in a hospital admission)        Review of Systems - Negative     /72  Pulse 89  Temp 98.6  F (37  C) (Temporal)   SpO2 96%  Breastfeeding? No    Objective findings: General: The patient is alert and in no acute distress      Integument: Nails bilateral feet are normal length and color.  Skin bilaterally warm supple and intact.        Vascular: DP and PT pulses +2/4 bilateral feet.  Capillary refill less than 2 seconds bilateral feet.      Neurologic: Negative clonus, negative Babinski bilaterally.  There is a positive Alicia sign noted fourth intermetatarsal space left foot.      Musculoskeletal: Range of motion within normal limits bilaterally.  Muscle power +5/5 bilaterally in all compartments.  There is a large firm subcutaneous mass on the medial aspect of the head of the first metatarsal left foot.  There is mild pain on palpation along the medial aspect of the head of the first metatarsal left foot.  There is lateral deviation of the left great toe which buttresses the second toe left foot.      Assessment: Hallux abductovalgus left foot, digital neuroma fourth  intermetatarsal space left foot      Plan: An x-ray of the left foot was taken today.  I informed the patient she would require surgical correction of her painful bunion deformity.  I have recommended a bunionectomy with a first metatarsal osteotomy with internal screw fixation of her left foot.  She was told that this procedure is done on an outpatient basis under local anesthesia with IV sedation.  She was told the procedure takes approximately 25 minutes to perform.  She would then be discharged and able to weight-bear on her foot in a postop shoe for 1 month.  She was told she would have to go n.p.o. at midnight prior to the procedure and she would have to see her primary care physician for preoperative consultation.  I informed the patient that her symptoms from her neuroma do not require any particular treatment at this time.  She is to monitor her condition.  If she develops pain she should return to the clinic for follow-up visit.  I informed the patient that this neuroma is not associated with her diabetes.

## 2021-06-13 NOTE — TELEPHONE ENCOUNTER
FYI - Status Update  Who is Calling: Patient  Update: Patient is out of medication.  She states pharmacy has sent in request twice but no other encounter is found.    Okay to leave a detailed message?:  No return call needed

## 2021-06-14 NOTE — ANESTHESIA PREPROCEDURE EVALUATION
Anesthesia Evaluation      Patient summary reviewed   No history of anesthetic complications     Airway   Mallampati: II  Neck ROM: full   Pulmonary - normal exam    breath sounds clear to auscultation  (-) sleep apnea, not a smoker                         Cardiovascular   (+) hypertension, , hypercholesterolemia,     (-) murmur  Rhythm: regular  Rate: normal,    no murmur      Neuro/Psych    (+) neuromuscular disease,  anxiety/panic attacks,     Endo/Other    (+) diabetes mellitus type 2,      Comments: Breast CA tx with chemo/radiation  IGA monoclonal gammopathy     GI/Hepatic/Renal    (+) GERD,             Dental                         Anesthesia Plan  Planned anesthetic: MAC    ASA 3   Induction: intravenous   Anesthetic plan and risks discussed with: spouse  Anesthesia plan special considerations: antiemetics,   Post-op plan: routine recovery

## 2021-06-14 NOTE — PROGRESS NOTES
Assessment/Plan:      Visit for Preoperative Exam.     Patient approved for surgery with general or local anesthesia. Recommend holding metformin tonight and tomorrow AM.  No lisinopril on the AM of surgery.  She'll take her prilosec witha small sip of water.     Patient Instructions   Stop your metformin tonight and tomorrow AM.  No lisinopril tomorrow AM.    You can resume your usual medications, including aspirin after the surgery.    Today's labs will be available on [facility].  If you aren't signed up, then we'll mail them out.  If anything needs more immediate follow up, we'll also call.    Take care!      Subjective:     Scheduled Procedure: Left foot bunionectomy  Surgery Date:  12/1/17  Surgery Location:  [facility]   Surgeon:  Dr. Stevens    ASSESSED PROBLEMS:  Problem List Items Addressed This Visit     Type 2 diabetes mellitus    Hypertension    Mixed hyperlipidemia    Hallux abducto valgus, left      Other Visit Diagnoses     Preop cardiovascular exam    -  Primary    Relevant Orders    Electrocardiogram Perform and Read (Completed)    Hypertension        Relevant Orders    Potassium          CHIEF COMPLAINT:  Chief Complaint   Patient presents with     Pre-op Exam       HISTORY OF PRESENT ILLNESS:  Silvia Avila is a 82 y.o. female here for an internal medicine pre-operative consultation. The exam is requested by   in preparation for left foot bunionectomy to be performed at [facility]  on 11/30/17. Today s examination on 11/30/17 is done to review the underlying surgical condition of left foot bunion, clear for anesthesia, and review medical problems with appropriate changes in medications.    My last saw Dr. Stevens on November 1 of this year.  I reviewed that note today.  She saw him for a painful bunion on her left foot.  It has been present for several years.  Certain shoes cause more pain.  She only gets pain relief by removing her shoes.  In addition to pain, she has numbness involving the  third and fourth toes of the left foot.  She had an x-ray obtained.  She has opted for surgical correction.    She had a physical with me in October of this year.  She has hypertension that is well controlled.  She has hyperlipidemia.  When her cholesterol was checked in October of this year, it was still not optimal so we increased her atorvastatin from 20-40 mg.  She has type 2 diabetes, she is managed with Metformin 500 mg twice daily.  She takes a daily 81 mg asa but she stopped it a week ago.  She has osteoporosis and had been getting Prolia however, has been off of that now for over a year.    Silvia Avila has tolerated previous surgeries well without bleeding or anesthesia difficulty.       .............................................................................................................................................  Current Outpatient Prescriptions   Medication Sig Dispense Refill     ammonium lactate (AMLACTIN) 12 % cream APPLY PRN TO HANDS D  10     aspirin 81 mg chewable tablet Chew 81 mg daily.       atorvastatin (LIPITOR) 40 MG tablet Take 1 tablet (40 mg total) by mouth at bedtime. 90 tablet 3     blood glucose test (ONETOUCH ULTRA TEST) strips Dispense brand per patient's insurance at pharmacy discretion. 200 strip 2     CALCITRATE 200 mg (950 mg) tablet TAKE 1 TABLET BY MOUTH TWICE DAILY (Patient taking differently: TAKE 1 TABLET BY MOUTH DAILY) 200 tablet 3     cholecalciferol, vitamin D3, (VITAMIN D3) 2,000 unit cap Take 1 capsule by mouth daily.       clobetasol (TEMOVATE) 0.05 % cream Apply 1 application topically 2 (two) times a day as needed. Apply daily as needed for eczema       docusate sodium (COLACE) 100 MG capsule Take 100 mg by mouth 2 (two) times a day.       ferrous gluconate (FERGON) 324 MG tablet Take 1 tablet (324 mg total) by mouth daily with breakfast. 90 tablet 3     fluocinonide (LIDEX) 0.05 % external solution Apply 1 application topically daily as needed.  Apply to scalp       fluticasone (FLONASE) 50 mcg/actuation nasal spray INHALE 2 SPRAYS IN EACH NOSTRIL DAILY 16 g 1     folic acid (FOLVITE) 1 MG tablet Take 400 mcg by mouth daily.        LACTOBACILLUS ACIDOPHILUS (PROBIOTIC ORAL) Take 1 tablet by mouth daily.        lancets (ONETOUCH DELICA LANCETS) 33 gauge Misc Test twice daily 200 each 2     lisinopril (PRINIVIL,ZESTRIL) 10 MG tablet TAKE 1 TABLET BY MOUTH EVERY DAY 90 tablet 2     metFORMIN (GLUCOPHAGE) 500 MG tablet Take 1 tablet (500 mg total) by mouth 2 (two) times a day with meals. 180 tablet 1     nystatin (MYCOSTATIN) cream Apply 1 application topically as needed for dry skin. Perineum as needed       omeprazole (PRILOSEC) 20 MG capsule Take 1 capsule (20 mg total) by mouth daily. 90 capsule 0     PARoxetine (PAXIL) 20 MG tablet Take 1 tablet (20 mg total) by mouth daily. 90 tablet 0     Current Facility-Administered Medications   Medication Dose Route Frequency Provider Last Rate Last Dose     denosumab 60 mg (PROLIA 60 mg/ml)  60 mg Subcutaneous Q6 Months Ilene Granados MD   60 mg at 07/28/16 1132       Allergies   Allergen Reactions     Carrot Hives     Only raw. Tolerates cooked.     Carrot Hives       Immunization History   Administered Date(s) Administered     Influenza [id number], 12/29/2009     Influenza high dose, seasonal 10/03/2017     Influenza, inj, historic,unspecified 11/03/1998, 10/27/1999, 10/26/2004, 11/04/2005, 11/27/2006, 10/26/2007, 09/23/2008, 10/16/2009, 09/19/2013, 10/17/2014     Influenza,seasonal quad, PF, 36+MOS 10/05/2015     Pneumo Conj 13-V (2010&after) 05/11/2017     Pneumo Polysac 23-V 11/26/1999, 12/15/2003     Td,adult,historic,unspecified 02/19/1994, 12/15/2003     Tdap 11/01/2008     ZOSTER 03/23/2011       Patient Active Problem List   Diagnosis     Eczema     Osteoporosis     Anemia     Type 2 diabetes mellitus     Esophageal reflux     Anxiety     Hypertension     Mixed hyperlipidemia     Lumbar radiculopathy      IgA monoclonal gammopathy of uncertain significance     Malignant neoplasm of right breast     Hallux abducto valgus, left       Past Medical History:   Diagnosis Date     Anemia      Breast cancer 1997    left     Cancer     breast     Diabetes mellitus type 2, diet-controlled      Hx antineoplastic chemotherapy      Hx of radiation therapy      Hyperlipidemia      Hypertension      IgA monoclonal gammopathy of uncertain significance      Low back pain        Social History     Social History     Marital status:      Spouse name: N/A     Number of children: N/A     Years of education: N/A     Occupational History     Not on file.     Social History Main Topics     Smoking status: Never Smoker     Smokeless tobacco: Never Used     Alcohol use 1.2 oz/week     2 Glasses of wine per week      Comment: occasional glass of wine     Drug use: No     Sexual activity: Not on file     Other Topics Concern     Not on file     Social History Narrative       Past Surgical History:   Procedure Laterality Date     BREAST BIOPSY Left 1997     EXPLORATORY LAPAROTOMY N/A      EYE SURGERY Bilateral      EYE SURGERY Left     straightened out left eye, and bilateral eye surgery for entropion     left lumpectomy with axillary node dissection  1997     NC EXCISE BREAST CYST Left 1997    Description: Breast Surgery Lumpectomy;  Recorded: 11/28/2012;  Comments: left breast 1997     NC HEMORRHOIDECTOMY INTERNAL RUBBER BAND LIGATIONS      Description: Hemorrhoidectomy;  Recorded: 06/29/2012;     NC INCISE FINGER TENDON SHEATH      Description: Hand Incision Tendon Sheath Of A Finger;  Proc Date: 12/19/2009;  Comments: Left thumb Triger finger release By Dr. Murillo     NC REMOVE ARMPIT LYMPH NODES SUPERFIC      Description: Axillary Lymphadenectomy;  Recorded: 11/28/2012;  Comments: 1997     NC REPAIR OF PERINEUM,NON OBSTETRICAL      Description: Perineoplasty (Nonobstetrical);  Recorded: 06/29/2012;     NC REVISE MEDIAN N/CARPAL  TUNNEL SURG      Description: Neuroplasty Decompression Median Nerve At Carpal Tunnel;  Recorded: 06/29/2012;         History of Present Illness  Recent Health  Fever: no  Chills: no  Fatigue: no  Chest Pain: no  Cough: no  Dyspnea: no  Urinary Frequency: no  Nausea: no  Vomiting: no  Diarrhea: no  Abdominal Pain: no  Easy Bruising: no  Lower Extremity Swelling: no  Poor Exercise Tolerance: no    Most recent Health Maintenance Visit:  1 year(s) ago    Pertinent History  Prior Anesthesia: yes  Previous Anesthesia Reaction:  no  Diabetes: yes  Cardiovascular Disease: no  Pulmonary Disease: no  Renal Disease: no  GI Disease: no  Sleep Apnea: no  Thromboembolic Problems: no  Clotting Disorder: no  Bleeding Disorder: no  Transfusion Reaction: no  Impaired Immunity: no  Steroid use in the last 6 months: no  Frequent Aspirin use: yes, has stopped last week    Family history of none    Social history of patient does not wear denture or partial plates, there is no transfusion refusal and there are no concerns regarding care after surgery    After surgery, the patient plans to recover at home with family.    Review of Systems   A 12 point comprehensive review of systems was negative except as noted.      Objective:      Vitals:    11/30/17 1442   BP: 102/56   Pulse: 85   SpO2: 98%        Physical Exam:  General Appearance: Alert, cooperative, no distress, appears stated age   Head: Normocephalic, without obvious abnormality, atraumatic   Eyes: PERRL, conjunctiva/corneas clear, EOM's intact   Throat: Lips, mucosa, and tongue normal; teeth and gums normal   Neck: Normal ROM  Lungs: Clear to auscultation bilaterally, respirations unlabored.   Heart: Regular rate and rhythm, S1 and S2 normal, no murmur, rub, or gallop,   Abdomen: Soft, non-tender, bowel sounds active all four quadrants, no masses, no organomegaly   Extremities: Extremities normal, atraumatic, no cyanosis or edema   Skin: Skin color, texture, turgor normal, no  rashes or lesions   Neurologic: Normal     EKG 11/30/17:  NSR, rate 82, no acute ST-T changes

## 2021-06-14 NOTE — ANESTHESIA POSTPROCEDURE EVALUATION
Patient: Silvia Avila  BUNIONECTOMY, 1ST METATARSAL OSTEOTOMY WITH INTERNAL FIXATION LEFT FOOT  Anesthesia type: No value filed.    Patient location: Phase II Recovery  Last vitals:   Vitals:    12/01/17 1016   BP: 141/63   Pulse: 91   Resp: 12   Temp: 36.5  C (97.7  F)   SpO2: 100%     Post vital signs: stable  Level of consciousness: awake and responds to simple questions  Post-anesthesia pain: pain controlled  Post-anesthesia nausea and vomiting: no  Pulmonary: unassisted, return to baseline  Cardiovascular: stable and blood pressure at baseline  Hydration: adequate  Anesthetic events: no    QCDR Measures:  ASA# 11 - Veronica-op Cardiac Arrest: ASA11B - Patient did NOT experience unanticipated cardiac arrest  ASA# 12 - Veronica-op Mortality Rate: ASA12B - Patient did NOT die  ASA# 13 - PACU Re-Intubation Rate: NA - No ETT / LMA used for case  ASA# 10 - Composite Anes Safety: ASA10A - No serious adverse event    Additional Notes:   History of  section  '

## 2021-06-14 NOTE — PROGRESS NOTES
Subjective findings: The patient return to the clinic today for postop visit #1, 1 week status post bunionectomy left foot.  The patient is in good spirits and she had no complaints other than mild to moderate pain.    Objective findings: The dressings were removed and the wound margins are well coaptated and maintained.  There is minimal edema noted.  There is no erythema or cellulitis noted.  Neurovascular status is intact.  Vital signs are stable.  Surgical correction is maintained.    Assessment: Hallux abductovalgus left foot    Plan: A sterile dressing was applied to the left foot today.  I have asked the patient to return to the clinic in 1 week for postop visit #2 at which time the sutures will be removed and a postop x-ray will be taken.

## 2021-06-14 NOTE — TELEPHONE ENCOUNTER
RN cannot approve Refill Request    RN can NOT refill this medication Protocol failed and NO refill given. Last office visit: 9/29/2020 Chuy Avila MD Last Physical: 1/23/2020 Last MTM visit: Visit date not found Last visit same specialty: 9/29/2020 Chuy Avila MD.  Next visit within 3 mo: Visit date not found  Next physical within 3 mo: Visit date not found      Pascale Rodriguez, [facility] Triage/Med Refill 12/31/2020    Requested Prescriptions   Pending Prescriptions Disp Refills     CALCITRATE 200 mg (950 mg) tablet [Pharmacy Med Name: CALCITRATE 950MG TABLETS] 90 tablet 0     Sig: TAKE 1 TABLET BY MOUTH DAILY       There is no refill protocol information for this order        omeprazole (PRILOSEC) 20 MG capsule [Pharmacy Med Name: OMEPRAZOLE 20MG CAPSULES] 90 capsule 3     Sig: TAKE 1 CAPSULE(20 MG) BY MOUTH DAILY BEFORE BREAKFAST       GI Medications Refill Protocol Passed - 12/30/2020  5:56 AM        Passed - PCP or prescribing provider visit in last 12 or next 3 months.     Last office visit with prescriber/PCP: 9/29/2020 Chuy Avila MD OR same dept: 9/29/2020 Chuy Avila MD OR same specialty: 9/29/2020 Chuy Avila MD  Last physical: 1/23/2020 Last MTM visit: Visit date not found   Next visit within 3 mo: Visit date not found  Next physical within 3 mo: Visit date not found  Prescriber OR PCP: Chuy Avila MD  Last diagnosis associated with med order: 1. Osteoporosis  - CALCITRATE 200 mg (950 mg) tablet [Pharmacy Med Name: CALCITRATE 950MG TABLETS]; TAKE 1 TABLET BY MOUTH DAILY  Dispense: 90 tablet; Refill: 0    2. Esophageal reflux  - omeprazole (PRILOSEC) 20 MG capsule [Pharmacy Med Name: OMEPRAZOLE 20MG CAPSULES]; TAKE 1 CAPSULE(20 MG) BY MOUTH DAILY BEFORE BREAKFAST  Dispense: 90 capsule; Refill: 3    3. Hyperlipidemia  - atorvastatin (LIPITOR) 40 MG tablet [Pharmacy Med Name: ATORVASTATIN 40MG TABLETS]; TAKE 1 TABLET(40 MG) BY MOUTH AT  BEDTIME  Dispense: 90 tablet; Refill: 3    If protocol passes may refill for 12 months if within 3 months of last provider visit (or a total of 15 months).                atorvastatin (LIPITOR) 40 MG tablet [Pharmacy Med Name: ATORVASTATIN 40MG TABLETS] 90 tablet 3     Sig: TAKE 1 TABLET(40 MG) BY MOUTH AT BEDTIME       Statins Refill Protocol (Hmg CoA Reductase Inhibitors) Passed - 12/30/2020  5:56 AM        Passed - PCP or prescribing provider visit in past 12 months      Last office visit with prescriber/PCP: 9/29/2020 Chuy Avila MD OR same dept: 9/29/2020 Chuy Avila MD OR same specialty: 9/29/2020 Chuy Avila MD  Last physical: 1/23/2020 Last MTM visit: Visit date not found   Next visit within 3 mo: Visit date not found  Next physical within 3 mo: Visit date not found  Prescriber OR PCP: Chuy Avila MD  Last diagnosis associated with med order: 1. Osteoporosis  - CALCITRATE 200 mg (950 mg) tablet [Pharmacy Med Name: CALCITRATE 950MG TABLETS]; TAKE 1 TABLET BY MOUTH DAILY  Dispense: 90 tablet; Refill: 0    2. Esophageal reflux  - omeprazole (PRILOSEC) 20 MG capsule [Pharmacy Med Name: OMEPRAZOLE 20MG CAPSULES]; TAKE 1 CAPSULE(20 MG) BY MOUTH DAILY BEFORE BREAKFAST  Dispense: 90 capsule; Refill: 3    3. Hyperlipidemia  - atorvastatin (LIPITOR) 40 MG tablet [Pharmacy Med Name: ATORVASTATIN 40MG TABLETS]; TAKE 1 TABLET(40 MG) BY MOUTH AT BEDTIME  Dispense: 90 tablet; Refill: 3    If protocol passes may refill for 12 months if within 3 months of last provider visit (or a total of 15 months).

## 2021-06-14 NOTE — PROGRESS NOTES
Subjective findings: The patient return to the clinic today for postop visit #2, 2 weeks status post bunionectomy left foot.  The patient is in good spirits and she had no complaints other than mild to moderate pain.     Objective findings: The dressings were removed and the wound margins are well healed. There is no edema noted.  There is no erythema or cellulitis noted.  Neurovascular status is intact.  Vital signs are stable.  Surgical correction is maintained.     Assessment: Hallux abductovalgus left foot     Plan: A postop x-ray was taken today.  A sterile dressing was applied to the left foot today.  I have asked the patient to return to the clinic in 2 weeks for postop visit #3.

## 2021-06-14 NOTE — PROGRESS NOTES
"Pt started prolia in 2015, however only had one injection, 2nd injection 1/13/16    Prolia Injection Phone Screen      Screening questions have been asked 2-3 days prior to administration visit for Prolia. If any questions are answered with \"Yes,\" this phone encounter were will routed to ordering provider for further evaluation.     1.  When was the last injection?  7/16/20    2.  Has insurance for this injection been verified?  Yes    3.  Did you experience any new onset achiness or rashes that lasted for over a month with your previous Prolia injection?   No    4.  Do you have a fever over 101?F or a new deep cough that is unusual for you today? No    5.  Have you started any new medications in the last 6 months that you were told could affect your immune system? These may have been prescribed by oncologist, transplant, rheumatology, or dermatology.   No    6.  In the last 6 months have you have gastric bypass or parathyroid surgery?   No    7.  Do you plan dental work requiring drilling into the bone such as implants/extractions or oral surgery in the next 2-3 months?   No    8. Do you have new insurance since the last injection?    Patient informed if symptoms discussed above present prior to their administration appointment, they are to notify clinic immediately.     Era Carranza          The following steps were completed to comply with the REMS program for Prolia:  1. Ordering provider has previously reviewed information in the Medication Guide and Patient Counseling Chart, including the serious risks of Prolia  and the symptoms of each risk and have been advised to seek prompt medical attention if they have signs or symptoms of any of the serious risks.  2. Provided each patient a copy of the Medication Guide and Patient Brochure.  See MAR for administration details.   Indication: Prolia  (denosumab) is a prescription medicine used to treat osteoporosis in patients who:   Are at high risk for fracture, " meaning patients who have had a fracture related to osteoporosis, or who have multiple risk factors for fracture; Cannot use another osteoporosis medicine or other osteoporosis medicines did not work well.   The timeline for early/late injections would be 4 weeks early and any time after the 6 month reina. If a patient receives their injection late, then the subsequent injection would be 6 months from the date that they actually received the injection    Have the screening questions been asked prior to this administration? Yes      After obtaining consent, and per orders of Nadia Atkins NP, injection of Prolia 60 mg given by Era Carranza. Patient instructed to remain in clinic for 20 minutes afterwards, and to report any adverse reaction to me immediately.

## 2021-06-14 NOTE — ANESTHESIA CARE TRANSFER NOTE
Last vitals:   Vitals:    12/01/17 1016   BP: 141/63   Pulse: 91   Resp: 12   Temp: 36.5  C (97.7  F)   SpO2: 100%     Patient's level of consciousness is drowsy  Spontaneous respirations: yes  Maintains airway independently: yes  Dentition unchanged: yes  Oropharynx: oropharynx clear of all foreign objects    QCDR Measures:  ASA# 20 - Surgical Safety Checklist: WHO surgical safety checklist completed prior to induction  PQRS# 430 - Adult PONV Prevention: 4558F - Pt received => 2 anti-emetic agents (different classes) preop & intraop  ASA# 8 - Peds PONV Prevention: NA - Not pediatric patient, not GA or 2 or more risk factors NOT present  PQRS# 424 - Veronica-op Temp Management: 4559F - At least one body temp DOCUMENTED => 35.5C or 95.9F within required timeframe  PQRS# 426 - PACU Transfer Protocol: - Transfer of care checklist used  ASA# 14 - Acute Post-op Pain: ASA14B - Patient did NOT experience pain >= 7 out of 10

## 2021-06-14 NOTE — PROGRESS NOTES
Silvia Avila is a 85 y.o. female who is being evaluated via a billable telephone visit.      What phone number would you like to be contacted at? [phone]  How would you like to obtain your AVS? AVS Preference: Mail a copy.       Reason for visit      1. Osteoporosis without current pathological fracture, unspecified osteoporosis type    2. Personal history of other drug therapy        History     Silvia Avila is a very pleasant 85 y.o. old female who presents for follow up.   SUMMARY:  Silvia Avila did have a breast cancer in 1997 treated with surgery radiation chemotherapy and she has been on Evista since that time.  She then had a follow-up bone density test done in June 2014 and at that time it showed that she was significantly below baseline and trabecular bone score was poor.  Her fracture risk was calculated to be high and in view of her decline on raloxifene. She is here to consider alternative treatment. She has a history of significant GI distress and requiring daily H2 blocker use and her calculated fracture risk would be considered high at this point.  I believe in the context of her current bone density including the low trabecular bone score she would be a candidate for denosumab.  We had a lengthy discussion about this and she is in agreement.    TODAY:  Silvia is contacted today in f/u for Osteoporosis. She continues on Prolia injections without any difficulties. She has had no falls in the last year. Her last Dexa Scan of 1/2020 showed stability.  She reports that she is doing some walking, but not a lot. She is taking Vit D daily. Her current level is 42.1 and down a bit from last year. Last Calcium level was 9.6.     Risk Factors     The following high- risk conditions have been ruled out: celiac disease, eating disorders, gastric bypass, hyperparathyroidism, inflammatory bowel disease, hyperthyroidism, rheumatoid arthritis, lupus, chronic kidney disease.     Silvia Avila has the  following risk factors: Age, Female gender,  and Low BMI     She is not on high risk medications such as glucocorticoids, anti-coagulants, anti-convulsants, chemotherapy or levothyroxine.    Patient deniesHysterectomy, Oophrectomy, Breast cancer and Family history of breast cancer.      Past Medical History     Patient Active Problem List   Diagnosis     Anemia     Type 2 diabetes mellitus (H)     Anxiety     Hypertension     Mixed hyperlipidemia     IgA monoclonal gammopathy of uncertain significance     Personal history of breast cancer     Bilateral sensorineural hearing loss     Gastroesophageal reflux disease     Osteopenia       Family History       family history includes Dementia in her brother and sister; No Medical Problems in her father and mother; Ovarian cancer (age of onset: 70) in her sister.    Social History      reports that she has never smoked. She has never used smokeless tobacco. She reports current alcohol use of about 1.0 standard drinks of alcohol per week. She reports that she does not use drugs.      Review of Systems     Patient denies current pain, limited mobility, fractures.   Remainder per HPI.      Vital Signs     There were no vitals taken for this visit.    Physical Exam         Assessment     1. Osteoporosis without current pathological fracture, unspecified osteoporosis type    2. Personal history of other drug therapy        Plan       Silvia will remain on the Prolia injections and f/u with me in 1 year - sooner with problems or concerns.         Naida STEPHENS Endocrinology  1/25/2021  10:10 AM            Current Medications     Outpatient Medications Prior to Visit   Medication Sig Dispense Refill     atorvastatin (LIPITOR) 40 MG tablet TAKE 1 TABLET(40 MG) BY MOUTH AT BEDTIME 90 tablet 3     blood glucose meter (GLUCOMETER) Test one time daily. Dispense glucometer brand per patient's insurance at pharmacy discretion. 1 each 0     blood glucose test strips Test  one time daily. Dispense brand per patient's insurance at pharmacy discretion. 100 strip 11     CALCITRATE 200 mg (950 mg) tablet TAKE 1 TABLET BY MOUTH DAILY 90 tablet 0     cholecalciferol, vitamin D3, (VITAMIN D3) 2,000 unit cap Take 1 capsule by mouth daily.       clobetasol (TEMOVATE) 0.05 % cream Apply 1 application topically 2 (two) times a day as needed. Apply daily as needed for eczema       generic lancets Test one time daily. Dispense brand per patient's insurance at pharmacy discretion. 100 each 11     LACTOBACILLUS ACIDOPHILUS (PROBIOTIC ORAL) Take 1 tablet by mouth daily.        lancets (ONETOUCH DELICA LANCETS) 33 gauge Misc Test twice daily 200 each 2     lisinopriL (PRINIVIL,ZESTRIL) 10 MG tablet TAKE 1 TABLET(10 MG) BY MOUTH DAILY 90 tablet 1     nystatin (MYCOSTATIN) cream Perineum as needed 30 g 0     omeprazole (PRILOSEC) 20 MG capsule TAKE 1 CAPSULE(20 MG) BY MOUTH DAILY BEFORE BREAKFAST 90 capsule 3     PARoxetine (PAXIL) 20 MG tablet Take 1 tablet (20 mg total) by mouth daily. 90 tablet 2     pioglitazone (ACTOS) 15 MG tablet TAKE 1 TABLET(15 MG) BY MOUTH DAILY 90 tablet 1     ferrous gluconate (FERGON) 324 MG tablet TAKE 1 TABLET(324 MG) BY MOUTH DAILY WITH BREAKFAST 90 tablet 0     folic acid (FOLVITE) 1 MG tablet Take 1 mg by mouth daily.             Facility-Administered Medications Prior to Visit   Medication Dose Route Frequency Provider Last Rate Last Admin     denosumab 60 mg (PROLIA 60 mg/ml)  60 mg Subcutaneous Q6 Months Nadia Atkins, NP   60 mg at 06/07/19 1301     denosumab 60 mg (PROLIA 60 mg/ml)  60 mg Subcutaneous Q6 Months Nadia Atkins NP   60 mg at 07/16/20 1120         Lab Results     PTH   Date Value Ref Range Status   01/21/2015 44 10 - 86 pg/mL Final     Calcium   Date Value Ref Range Status   09/28/2020 9.6 8.5 - 10.5 mg/dL Final     Iron   Date Value Ref Range Status   01/23/2020 92 42 - 175 ug/dL Final           Imaging Results   Last DEXA scan:  Results for  orders placed in visit on 11/04/19   DXA Bone Density Scan    Narrative 1/3/2020      RE: Silvia Avila  YOB: 1935        Dear [place], Chuy Izquierdo,    Patient Profile:  84 y.o. female, postmenopausal, is here for the follow up bone density   test.   History of fractures - None. Family history of osteoporosis - None.    Family history of hip fracture: None. Smoking history - No. Osteoporosis   treatment past -  Yes;  HRT, Bisphosphonates and Prolia. Osteoporosis   treatment current - No.  Chronic medical problems - Breast cancer,   Diabetes Mellitus, Radiation treatment and Spine surgery. High risk   medications -  Chemotherapy;  Yes, in the Past and Aromatase Inhibitor;    Yes, in the Past.      Assessment:    1. The spine bone density L1-L2 with T-score -1.6, stable compared to   2017.  2. Femoral bone densities show left femoral neck T- score -1.8 and right   femoral neck T-score -1.9, stable.  3. Trabecular bone score indicates moderate trabecular bone architecture.      84 y.o. female with LOW BONE DENSITY (OSTEOPENIA) and HIGH fracture risk,   adjusted for the TBS, with major osteoporotic fracture risk 17.6% and hip   fracture risk 5.0%.         Recommendations:  Appropriate evaluation and treatment recommended with follow up bone   density scan after 1 year of active treatment.      Bone densitometry was performed on your patient using our [employer]   densitometer. The results are summarized and a copy of the actual scans   are included for your review. In conformity with the International Society   of Clinical Densitometry's most recent position statement for DXA   interpretation (2015), the diagnosis will be made on the lowest measured   T-score of the lumbar spine, femoral neck, total proximal femur or 33%   radius. Note the change in terminology for diagnostic classification from   OSTEOPENIA to LOW BONE MASS. All trending for sequential exams will be   done using multiple vertebrae  or the total proximal femur. Fracture risk   is based on the WHO Fracture Risk Assessment Tool (FRAX). If additional   information is needed or if you would like to discuss the results, please   do not hesitate to call me.       Thank you for referring this patient to [facility].   We are happy to be of service in support of you and your practice. If you   have any questions or suggestions to improve our service, please call me   at [phone].     Sincerely,     Michelle Zapata M.D. C.C.ADAM.  Osteoporosis Services, [facility]             Phone call duration: 20 minutes

## 2021-06-15 ENCOUNTER — COMMUNICATION - HEALTHEAST (OUTPATIENT)
Dept: INTERNAL MEDICINE | Facility: CLINIC | Age: 86
End: 2021-06-15

## 2021-06-15 DIAGNOSIS — F41.1 ANXIETY STATE: ICD-10-CM

## 2021-06-15 RX ORDER — PAROXETINE 20 MG/1
TABLET, FILM COATED ORAL
Qty: 90 TABLET | Refills: 2 | Status: SHIPPED | OUTPATIENT
Start: 2021-06-15 | End: 2022-03-12

## 2021-06-15 NOTE — PROGRESS NOTES
Assessment: 3.5 weeks status post distal left first metatarsal osteotomy.    Plan: X-ray is reassuring today.  Continue with weightbearing in the postoperative shoe and gradually move back to a regular shoe as pain allows.  Follow-up with Dr. Stevens as needed.      Subjective: The patient presents to the [facility] for a third postoperative visit. Surgery was 3.5 weeks ago on December 1, 2017 at [facility] by Dr. Stevens. The patient denies any falls or new injuries.    Objective: The incision is intact without gapping or drainage. Alignment of the foot is good. Minimal edema or erythema noted.  X-ray today shows single screw fixation through a distal first metatarsal osteotomy.

## 2021-06-15 NOTE — PROGRESS NOTES
Subjective:      Patient ID: Silvia Avila is a 82 y.o. female.    Chief Complaint:    HPI Silvia Avila is a 82 y.o. female who presents today complaining of URI symptoms x 1 week. She has trid Nyquil, Dayquil, Ibuprofen, and Tylenol with mild improvement. She denies any underlying lung disease. She reports a hx of sinus infections, but not terribly frequent. Patient feels like her sxs are worsening over the course of the past few days. The symptoms of cough and eye pressure are worsening the most.  In the past she has used Shani pot and Flonase, but she has not with this illness.  She denies any chest pain or shortness of breath.      Past Medical History:   Diagnosis Date     Anemia      Breast cancer 1997    left     Cancer     breast     Diabetes mellitus type 2, diet-controlled      Hx antineoplastic chemotherapy      Hx of radiation therapy      Hyperlipidemia      Hypertension      IgA monoclonal gammopathy of uncertain significance      Low back pain        Past Surgical History:   Procedure Laterality Date     BREAST BIOPSY Left 1997     BUNIONECTOMY Left 12/1/2017    Procedure: BUNIONECTOMY;  Surgeon: Carlos Stevens DPM;  Location: [facility];  Service:      EXPLORATORY LAPAROTOMY N/A      EYE SURGERY Bilateral      EYE SURGERY Left     straightened out left eye, and bilateral eye surgery for entropion     FOOT OSTEOTOMY Left 12/1/2017    Procedure: 1ST METATARSAL OSTEOTOMY WITH INTERNAL FIXATION LEFT FOOT;  Surgeon: Carlos Stevens DPM;  Location: [facility];  Service:      left lumpectomy with axillary node dissection  1997     MN EXCISE BREAST CYST Left 1997    Description: Breast Surgery Lumpectomy;  Recorded: 11/28/2012;  Comments: left breast 1997     MN HEMORRHOIDECTOMY INTERNAL RUBBER BAND LIGATIONS      Description: Hemorrhoidectomy;  Recorded: 06/29/2012;     MN INCISE FINGER TENDON SHEATH      Description: Hand Incision Tendon Sheath Of A Finger;  Proc Date:  12/19/2009;  Comments: Left thumb Triger finger release By Dr. Murillo     GA REMOVE ARMPIT LYMPH NODES SUPERFIC      Description: Axillary Lymphadenectomy;  Recorded: 11/28/2012;  Comments: 1997     GA REPAIR OF PERINEUM,NON OBSTETRICAL      Description: Perineoplasty (Nonobstetrical);  Recorded: 06/29/2012;     GA REVISE MEDIAN N/CARPAL TUNNEL SURG      Description: Neuroplasty Decompression Median Nerve At Carpal Tunnel;  Recorded: 06/29/2012;       Family History   Problem Relation Age of Onset     No Medical Problems Mother      No Medical Problems Father        Social History   Substance Use Topics     Smoking status: Never Smoker     Smokeless tobacco: Never Used     Alcohol use 1.2 oz/week     2 Glasses of wine per week      Comment: occasional glass of wine       Review of Systems   Constitutional: Positive for fatigue. Negative for fever.   HENT: Positive for congestion, ear pain (mild), rhinorrhea, sinus pain, sinus pressure and sore throat (mild).    Respiratory: Positive for cough (productive with green). Negative for shortness of breath and wheezing.    Cardiovascular: Negative for chest pain and leg swelling.   Gastrointestinal: Positive for diarrhea and vomiting.   Musculoskeletal: Positive for back pain.   Neurological: Positive for headaches.       Objective:     /60  Pulse 85  Temp 98.1  F (36.7  C) (Oral)   Resp 16  Wt 138 lb (62.6 kg)  SpO2 97%  BMI 27.87 kg/m2    Physical Exam   Constitutional: She appears well-developed and well-nourished. No distress.   HENT:   Head: Normocephalic and atraumatic.   Right Ear: External ear normal.   Left Ear: External ear normal.   Nose: Right sinus exhibits maxillary sinus tenderness and frontal sinus tenderness. Left sinus exhibits maxillary sinus tenderness and frontal sinus tenderness.   Mouth/Throat: Uvula is midline. No oropharyngeal exudate, posterior oropharyngeal edema or posterior oropharyngeal erythema.   Eyes: Conjunctivae are normal.    Neck: Normal range of motion. Neck supple.   Cardiovascular: Normal rate, regular rhythm and normal heart sounds.  Exam reveals no gallop and no friction rub.    No murmur heard.  Pulmonary/Chest: Effort normal and breath sounds normal. No respiratory distress. She has no wheezes. She has no rales.   Lymphadenopathy:     She has no cervical adenopathy.   Skin: She is not diaphoretic.   Psychiatric: She has a normal mood and affect. Her behavior is normal. Judgment and thought content normal.   Nursing note and vitals reviewed.    Clinical Decision Making:  Symptoms and exam are most consistent with a sinusitis.  Symptoms of an only been going on for 7 days I suspect viral etiology.  Recommended a few other at home remedies, but a prescription for Augmentin was sent with a start date of 1/4/17.  I instructed the patient to only fill this prescription if her symptoms were continuing to worsen.  Patient was agreeable to this plan.    Assessment:     Procedures    1. Sinusitis  amoxicillin-clavulanate (AUGMENTIN) 875-125 mg per tablet         Patient Instructions   1) Increase fluids and rest  2) Try Mucinex and Neti pot over the counter for congestion  3) Continue taking Tylenol for bodyache relief. Be aware that Dayquil has a Tylenol component, so do not double dose.  4) Salt water gargles and lozenges can be helpful for throat relief  5) If symptoms are not improving over the next 3 days then fill the prescription for Augmentin.

## 2021-06-16 NOTE — PROGRESS NOTES
Clinic Note    Assessment:     Assessment and Plan:    1. Dizziness  Patient's neurological exam is completely normal.  She is asymptomatic in the exam room.  The episode of dizziness she experienced was brief in nature, and sounds as though it were related to some orthostatic hypotension she was experiencing after getting up from a shower chair.  Patient does think that she may have been dehydrated at the time.  I spent a considerable amount of time during our exam educating the patient and her  about stroke warning signs and symptoms.  I provided them an educational handout to take with them in reference to any future possible stroke events.      Patient Instructions   Remember FAST; check your reference from today that I printed for you.                      Subjective:      Silvia Avila is a 82 y.o. female who comes to the clinic today for evaluation of dizziness.  Patient states that she was taking a shower this morning in her shower chair, and afterwards she stood up and left the bathroom when she noticed that she started feeling unbalanced with a sensation of dizziness.  Patient used to work as a nurse and became concerned for stroke, she took 3 aspirin and drink 3 glasses of water.  She never had any unilateral weakness, numbness or tingling, vision disturbances, slurred speech, or other focal neurological deficits.  She states that shortly after this incident her symptoms went away.  She never had any chest pain or shortness of breath.  She never experienced heart palpitations.  She never had a headache, but states that she has been dealing with an upper respiratory infection recently.  No fevers.  Patient takes lisinopril for blood pressure.  Patient says that she has a history of vertigo many years ago.    The following portions of the patient's history were reviewed and updated as appropriate: Allergies, medications, problems, prior note.    Review of Systems:    Review is negative except for  what is mentioned above.     Social Hx:    History   Smoking Status     Never Smoker   Smokeless Tobacco     Never Used         Objective:     Vitals:    02/26/18 1348   BP: 116/62   Pulse: 87   Temp: 98.1  F (36.7  C)   SpO2: 99%   Weight: 137 lb 14.4 oz (62.6 kg)       Exam:    General: No apparent distress. Calm. Alert and Oriented X3. Pt behavior is appropriate.  Head:Atraumatic. Normocephalic, non-tender to palpation  Neck: Supple. No JVD. Full ROM. No adenopathy  Eyes: PERRL, No discharge. No strabismus. No nystagmus.  Ears: TMs pearly gray with landmarks visible.   Nose/Mouth/Throat: Patent nares, no oral lesions, pharynx clear and without exudate. Uvula mid-line. Nasal septum mid-line. Clear turbinates.   Lymph: No axillar or cervical adenopathy.   Chest/Lungs: Normal chest wall, clear to auscultation, normal respiratory effort and rate.   Heart/Pulses: Regular rate and rhythm, strong and equal radial pulses, no murmurs. Capillary refill <2 seconds. No edema.   Neurologic: Interactive, alert, no focal findings, CNs intact.   Skin: Warm, dry. Normal hair pattern. Free of lesions. Normal skin turgor.       Patient Active Problem List   Diagnosis     Eczema     Osteoporosis     Anemia     Type 2 diabetes mellitus     Esophageal reflux     Anxiety     Hypertension     Mixed hyperlipidemia     Lumbar radiculopathy     IgA monoclonal gammopathy of uncertain significance     Malignant neoplasm of right breast     Hallux abducto valgus, left     Current Outpatient Prescriptions   Medication Sig Dispense Refill     ammonium lactate (AMLACTIN) 12 % cream APPLY PRN TO HANDS D  10     aspirin 81 mg chewable tablet Chew 81 mg daily.       atorvastatin (LIPITOR) 40 MG tablet Take 1 tablet (40 mg total) by mouth at bedtime. 90 tablet 3     blood glucose test (ONETOUCH ULTRA TEST) strips Dispense brand per patient's insurance at pharmacy discretion. 200 strip 2     CALCITRATE 200 mg (950 mg) tablet TAKE 1 TABLET BY MOUTH  TWICE DAILY (Patient taking differently: TAKE 1 TABLET BY MOUTH DAILY) 200 tablet 3     cholecalciferol, vitamin D3, (VITAMIN D3) 2,000 unit cap Take 1 capsule by mouth daily.       clobetasol (TEMOVATE) 0.05 % cream Apply 1 application topically 2 (two) times a day as needed. Apply daily as needed for eczema       docusate sodium (COLACE) 100 MG capsule Take 100 mg by mouth daily.        ferrous gluconate (FERGON) 324 MG tablet TAKE 1 TABLET(324 MG) BY MOUTH DAILY WITH BREAKFAST 90 tablet 0     fluocinonide (LIDEX) 0.05 % external solution Apply 1 application topically daily as needed. Apply to scalp       folic acid (FOLVITE) 1 MG tablet Take 400 mcg by mouth daily.        LACTOBACILLUS ACIDOPHILUS (PROBIOTIC ORAL) Take 1 tablet by mouth daily.        lancets (ONETOUCH DELICA LANCETS) 33 gauge Misc Test twice daily 200 each 2     lisinopril (PRINIVIL,ZESTRIL) 10 MG tablet TAKE 1 TABLET BY MOUTH EVERY DAY 90 tablet 2     metFORMIN (GLUCOPHAGE) 500 MG tablet Take 1 tablet (500 mg total) by mouth 2 (two) times a day with meals. 180 tablet 1     nystatin (MYCOSTATIN) cream Apply 1 application topically as needed for dry skin. Perineum as needed       omeprazole (PRILOSEC) 20 MG capsule Take 1 capsule (20 mg total) by mouth daily before breakfast. 90 capsule 3     PARoxetine (PAXIL) 20 MG tablet Take 1 tablet (20 mg total) by mouth daily. 90 tablet 3     Current Facility-Administered Medications   Medication Dose Route Frequency Provider Last Rate Last Dose     denosumab 60 mg (PROLIA 60 mg/ml)  60 mg Subcutaneous Q6 Months Ilene Granados MD   60 mg at 07/28/16 1132       Total time spent with patient was 20 minutes with >50% of time spent in face-to-face counseling regarding the above plan       Carlos Del Valle CNP (Rob)    2/26/2018

## 2021-06-16 NOTE — PROGRESS NOTES
Assessment and Plan:  Diagnoses and all orders for this visit:    Type 2 diabetes mellitus without complication  -     blood glucose test (ONETOUCH ULTRA TEST) strips; Use 1 each As Directed once for 1 dose. Once daily, Dispense brand per patient's insurance at pharmacy discretion.(E11.9)  Dispense: 100 strip; Refill: 3    Anemia  She hasn't had labs in awhile; we'll update them today    Sinusitis  I'll treat w/ doxy and she'll f/u if not improved    The following high BMI interventions were performed this visit: weight monitoring    Patient Instructions   Today's labs will be available on [employer].  If you aren't signed up, then we'll mail them out.  If anything needs more immediate follow up, we'll also call.    I agree with seeing your eye  Again for the right eye irritation.    Ask your insurance company about Shingrix (the newer shingles vaccine).    I sent in the antibiotic for your sinuses and lungs.    Plan on a follow up in 2 weeks if you're not feeling better.    Take care!        Chief Complaint:    Chief Complaint   Patient presents with     Diabetes     URI     cough, wheezing, runny nose 10 days, has been treated by Joey with augmentin       Subjective:  Silvia is a 82 y.o. female seen for follow up of diabetes.  We increased her metformin to bid and her A1c went from 7.8 to 6.9.  She has no difficulty w/ the dose increase.  She has long standing numbness of the 3 toes on her left foot.  She also has a hahn's neuroma.      Lab Results   Component Value Date    HGBA1C 6.9 (H) 03/12/2018       She is also dealing w/ a URI.  She has three grand kids and they've been sick all winter it seems.  She has coughing spells, chest rattling, low energy, decreased appetite, sinus mucous and drainage, a headache and occasional ear twinges.    She had similar sx in jan and was successfully treated w/ augmentin.    Review of Systems:  Eyes: ongoing right lower eye lid irritation  Otherwise neg aside from what [facility]  [facility]:  History   Smoking Status     Never Smoker   Smokeless Tobacco     Never Used     Current Outpatient Prescriptions   Medication Sig Dispense Refill     ammonium lactate (AMLACTIN) 12 % cream APPLY PRN TO HANDS D  10     aspirin 81 mg chewable tablet Chew 81 mg daily.       atorvastatin (LIPITOR) 40 MG tablet Take 1 tablet (40 mg total) by mouth at bedtime. 90 tablet 3     CALCITRATE 200 mg (950 mg) tablet TAKE 1 TABLET BY MOUTH TWICE DAILY (Patient taking differently: TAKE 1 TABLET BY MOUTH DAILY) 200 tablet 3     cholecalciferol, vitamin D3, (VITAMIN D3) 2,000 unit cap Take 1 capsule by mouth daily.       clobetasol (TEMOVATE) 0.05 % cream Apply 1 application topically 2 (two) times a day as needed. Apply daily as needed for eczema       docusate sodium (COLACE) 100 MG capsule Take 100 mg by mouth daily.        ferrous gluconate (FERGON) 324 MG tablet TAKE 1 TABLET(324 MG) BY MOUTH DAILY WITH BREAKFAST 90 tablet 0     fluocinonide (LIDEX) 0.05 % external solution Apply 1 application topically daily as needed. Apply to scalp       folic acid (FOLVITE) 1 MG tablet Take 400 mcg by mouth daily.        LACTOBACILLUS ACIDOPHILUS (PROBIOTIC ORAL) Take 1 tablet by mouth daily.        lancets (ONETOUCH DELICA LANCETS) 33 gauge Misc Test twice daily 200 each 2     lisinopril (PRINIVIL,ZESTRIL) 10 MG tablet TAKE 1 TABLET BY MOUTH EVERY DAY 90 tablet 2     metFORMIN (GLUCOPHAGE) 500 MG tablet Take 1 tablet (500 mg total) by mouth 2 (two) times a day with meals. 180 tablet 1     nystatin (MYCOSTATIN) cream Apply 1 application topically as needed for dry skin. Perineum as needed       omeprazole (PRILOSEC) 20 MG capsule Take 1 capsule (20 mg total) by mouth daily before breakfast. 90 capsule 3     PARoxetine (PAXIL) 20 MG tablet Take 1 tablet (20 mg total) by mouth daily. 90 tablet 3     doxycycline (VIBRA-TABS) 100 MG tablet Take 1 tablet (100 mg total) by mouth 2 (two) times a day for 10 days. 20  tablet 0     promethazine-codeine (PHENERGAN WITH CODEINE) 6.25-10 mg/5 mL syrup Take 5 mL by mouth every 4 (four) hours as needed for cough. 240 mL 0     Current Facility-Administered Medications   Medication Dose Route Frequency Provider Last Rate Last Dose     denosumab 60 mg (PROLIA 60 mg/ml)  60 mg Subcutaneous Q6 Months Ilene Granados MD   60 mg at 07/28/16 1132       Vitals:  Vitals:    03/15/18 1114   BP: 118/58   Pulse: 86   SpO2: 98%   Weight: 137 lb (62.1 kg)     Wt Readings from Last 3 Encounters:   03/15/18 137 lb (62.1 kg)   02/26/18 137 lb 14.4 oz (62.6 kg)   01/01/18 138 lb (62.6 kg)     Body mass index is 27.67 kg/(m^2).      Physical Exam:  Gen: pleasant, NAD  Sinuses: tender bilaterally  Ears: bilateral serous effusions  CV:  Regular rate and rhythmn, no murmurs  Lung: Clear  Abd: non-distended  Psych:  Alert, appropriate  Diabetic foot exam:  Intact pulses in feet, no lesions, no ulcers, cap refill nl

## 2021-06-16 NOTE — TELEPHONE ENCOUNTER
RN cannot approve Refill Request    RN can NOT refill this medication Protocol failed and NO refill given. Last office visit: 9/29/2020 Chuy Avila MD Last Physical: 1/23/2020 Last MTM visit: Visit date not found Last visit same specialty: 9/29/2020 Chuy Avila MD.  Next visit within 3 mo: Visit date not found  Next physical within 3 mo: Visit date not found      Pascale Rodriguez, [facility] Triage/Med Refill 4/1/2021    Requested Prescriptions   Pending Prescriptions Disp Refills     CALCITRATE 200 mg (950 mg) tablet [Pharmacy Med Name: CALCITRATE 950MG TABLETS] 90 tablet 0     Sig: TAKE 1 TABLET BY MOUTH DAILY       There is no refill protocol information for this order

## 2021-06-16 NOTE — TELEPHONE ENCOUNTER
Refill Approved    Rx renewed per Medication Renewal Policy. Medication was last renewed on 9/15/20.    Abraham Gilman, [facility] Triage/Med Refill 3/19/2021     Requested Prescriptions   Pending Prescriptions Disp Refills     lisinopriL (PRINIVIL,ZESTRIL) 10 MG tablet [Pharmacy Med Name: LISINOPRIL 10MG TABLETS] 90 tablet 1     Sig: TAKE 1 TABLET(10 MG) BY MOUTH DAILY       [facility] Inhibitors Refill Protocol Passed - 3/18/2021 12:10 PM        Passed - PCP or prescribing provider visit in past 12 months       Last office visit with prescriber/PCP: 9/29/2020 Chuy Avila MD OR same dept: 9/29/2020 Chuy Avila MD OR same specialty: 9/29/2020 Chuy Avila MD  Last physical: 1/23/2020 Last MTM visit: Visit date not found   Next visit within 3 mo: Visit date not found  Next physical within 3 mo: Visit date not found  Prescriber OR PCP: Chuy Avila MD  Last diagnosis associated with med order: 1. Essential hypertension, malignant  - lisinopriL (PRINIVIL,ZESTRIL) 10 MG tablet [Pharmacy Med Name: LISINOPRIL 10MG TABLETS]; TAKE 1 TABLET(10 MG) BY MOUTH DAILY  Dispense: 90 tablet; Refill: 1    If protocol passes may refill for 12 months if within 3 months of last provider visit (or a total of 15 months).             Passed - Serum Potassium in past 12 months     Lab Results   Component Value Date    Potassium 4.2 09/28/2020             Passed - Blood pressure filed in past 12 months     BP Readings from Last 1 Encounters:   09/29/20 118/68             Passed - Serum Creatinine in past 12 months     Creatinine   Date Value Ref Range Status   09/28/2020 1.25 (H) 0.60 - 1.10 mg/dL Final

## 2021-06-17 NOTE — PATIENT INSTRUCTIONS - HE
Patient Instructions by Chuy Avila MD at 1/23/2020  8:00 AM     Author: Chuy Avila MD Service: -- Author Type: Physician    Filed: 1/23/2020  9:08 AM Encounter Date: 1/23/2020 Status: Signed    : Chuy Avila MD (Physician)         Patient Education     Exercise for a Healthier Heart  You may wonder how you can improve the health of your heart. If youre thinking about exercise, youre on the right track. You dont need to become an athlete, but you do need a certain amount of brisk exercise to help strengthen your heart. If you have been diagnosed with a heart condition, your doctor may recommend exercise to help stabilize your condition. To help make exercise a habit, choose safe, fun activities.       Be sure to check with your health care provider before starting an exercise program.    Why exercise?  Exercising regularly offers many healthy rewards. It can help you do all of the following:    Improve your blood cholesterol levels to help prevent further heart trouble    Lower your blood pressure to help prevent a stroke or heart attack    Control diabetes, or reduce your risk of getting this disease    Improve your heart and lung function    Reach and maintain a healthy weight    Make your muscles stronger and more limber so you can stay active    Prevent falls and fractures by slowing the loss of bone mass (osteoporosis)    Manage stress better  Exercise tips  Ease into your routine. Set small goals. Then build on them.  Exercise on most days. Aim for a total of 150 or more minutes of moderate to  vigorous intensity activity each week. Consider 40 minutes, 3 to 4 times a week. For best results, activity should last for 40 minutes on average. It is OK to work up to the 40 minute period over time. Examples of moderate-intensity activity is walking one mile in 15 minutes or 30 to 45 minutes of yard work.  Step up your daily activity level. Along with your exercise program, try  being more active throughout the day. Walk instead of drive. Do more household tasks or yard work.  Choose one or more activities you enjoy. Walking is one of the easiest things you can do. You can also try swimming, riding a bike, or taking an exercise class.  Stop exercising and call your doctor if you:    Have chest pain or feel dizzy or lightheaded    Feel burning, tightness, pressure, or heaviness in your chest, neck, shoulders, back, or arms    Have unusual shortness of breath    Have increased joint or muscle pain    Have palpitations or an irregular heartbeat      7433-1222 [employer]. [address] [place] [address]. All rights reserved. This information is not intended as a substitute for professional medical care. Always follow your healthcare professional's instructions.         Patient Education   Understanding Index MyPlate  The USDA (US Department of Agriculture) has guidelines to help you make healthy food choices. These are called MyPlate. MyPlate shows the food groups that make up healthy meals using the image of a place setting. Before you eat, think about the healthiest choices for what to put onto your plate or into your cup or bowl. To learn more about building a healthy plate, visit www.choosemyplate.gov.       The Food Groups    Fruits: Any fruit or 100% fruit juice counts as part of the Fruit Group. Fruits may be fresh, canned, frozen, or dried, and may be whole, cut-up, or pureed. Make half your plate fruits and vegetables.    Vegetables: Any vegetable or 100% vegetable juice counts as a member of the Vegetable Group. Vegetables may be fresh, frozen, canned, or dried. They can be served raw or cooked and may be whole, cut-up, or mashed. Make half your plate fruits and vegetables.     Grains: All foods made from grains are part of the Grains Group. These include wheat, rice, oats, cornmeal, and barley such as bread, pasta, oatmeal, cereal, tortillas, and grits. Grains  should be no more than a quarter of your plate. At least half of your grains should be whole grains.    Protein: This group includes meat, poultry, seafood, beans and peas, eggs, processed soy products (like tofu), nuts (including nut butters), and seeds. Make protein choices no more than a quarter of your plate. Meat and poultry choices should be lean or low fat.    Dairy: All fluid milk products and foods made from milk that contain calcium, like yogurt and cheese are part of the Dairy Group. (Foods that have little calcium, such as cream, butter, and cream cheese, are not part of the group.) Most dairy choices should be low-fat or fat-free.    Oils: These are fats that are liquid at room temperature. They include canola, corn, olive, soybean, and sunflower oil. Foods that are mainly oil include mayonnaise, certain salad dressings, and soft margarines. You should have only 5 to 7 teaspoons of oils a day. You probably already get this much from the food you eat.  Use [employer] to Help Build Your Meals  The [employer] can help you plan and track your meals and activity. You can look up individual foods to see or compare their nutritional value. You can get guidelines for what and how much you should eat. You can compare your food choices. And you can assess personal physical activities and see ways you can improve. Go to www.Opt[employer].gov/[employer]/.    7383-3207 [employer]. [address] [place] [address]. All rights reserved. This information is not intended as a substitute for professional medical care. Always follow your healthcare professional's instructions.           Patient Education   Urinary Incontinence, Female (Adult)  Urinary incontinence means loss of control of the bladder. This problem affects many women, especially as they get older. If you have incontinence, you may be embarrassed to ask for help. But know that this problem can be treated.  Types of  Incontinence  There are different types of incontinence. Two of the main types are described here. You can have more than one type.    Stress incontinence. With this type, urine leaks when pressure (stress) is put on the bladder. This may happen when you cough, sneeze, or laugh. Stress incontinence most often occurs because the pelvic floor muscles that support the bladder and urethra are weak. This can happen after pregnancy and vaginal childbirth or a hysterectomy. It can also be due to excess body weight or hormone changes.    Urge incontinence (also called overactive bladder). With this type, a sudden urge to urinate is felt often. This may happen even though there may not be much urine in the bladder. The need to urinate often during the night is common. Urge incontinence most often occurs because of bladder spasms. This may be due to bladder irritation or infection. Damage to bladder nerves or pelvic muscles, constipation, and certain medicines can also lead to urge incontinence.  Treatment of urinary incontinence depends on the cause. Further evaluation is needed to find the type you have. This will likely include an exam and certain tests. Based on the results, you and your healthcare provider can then plan treatment. Until a diagnosis is made, the home care tips below can help relieve symptoms.  Home care    Do pelvic floor muscle exercises, if they are prescribed. The pelvic floor muscles help support the bladder and urethra. Many women find that their symptoms improve when doing special exercises that strengthen these muscles. To do the exercises contract the muscles you would use to stop your stream of urine, but do this when youre not urinating. Hold for 10 seconds, then relax. Repeat 10 to 20 times in a row, at least 3 times a day. Your provider may give you other instructions for how to do the exercises and how often.    Keep a bladder diary. This helps track how often and how much you urinate over a  set period of time. Bring this diary with you to your next visit with the provider. The information can help your provider learn more about your bladder problem.    Lose weight, if advised to by your provider. Excess weight puts pressure on the bladder. Your provider can help you create a weight-loss plan thats right for you. This may include exercising more and making certain diet changes.    Don't consume foods and drinks that may irritate the bladder. These can include alcohol and caffeinated drinks.    Quit smoking. Smoking and other tobacco use can lead to chronic cough that strains the pelvic floor muscles. Smoking may also damage the bladder and urethra. Talk with your provider about treatments or methods you can use to quit smoking.    If drinking large amounts of fluid causes you to have symptoms, you may be advised to limit your fluid intake. You may also be advised to drink most of your fluids during the day and to limit fluids at night.    If youre worried about urine leakage or accidents, you may wear absorbent pads to catch urine. Change the pads often. This helps reduce discomfort. It may also reduce the risk of skin or bladder infections.  Follow-up care  Follow up with your healthcare provider, or as directed. It may take some to find the right treatment for your problem. Your treatment plan may include special therapies or medicines. Certain procedures or surgery may also be options. Be sure to discuss any questions you have with your provider.  When to seek medical advice  Call the healthcare provider right away if any of these occur:    Fever of 100.4 F (38 C) or higher, or as directed by your provider    Bladder pain or fullness    Abdominal swelling    Nausea or vomiting    Back pain    Weakness, dizziness or fainting  Date Last Reviewed: 10/1/2017    1378-6857 [employer]. [address] [place] [address], [place] [address]. All rights reserved. This information is not intended as a  substitute for professional medical care. Always follow your healthcare professional's instructions.     Patient Education   Preventing Falls in the Home  As you get older, falls are more likely. Thats because your reaction time slows. Your muscles and joints may also get stiffer, making them less flexible. Illness, medications, and vision changes can also affect your balance. A fall could leave you unable to live on your own. To make your home safer, follow these tips:    Floors    Put nonskid pads under area rugs.    Remove throw rugs.    Replace worn floor coverings.    Tack carpets firmly to each step on carpeted stairs. Put nonskid strips on the edges of uncarpeted stairs.    Keep floors and stairs free of clutter and cords.    Arrange furniture so there are clear pathways.    Clean up any spills right away.    Bathrooms    Install grab bars in the tub or shower.    Apply nonskid strips or put a nonskid rubber mat in the tub or shower.    Sit on a bath chair to bathe.    Use bathmats with nonskid backing.    Lighting    Keep a flashlight in each room.    Put a nightlight along the pathway between the bedroom and the bathroom.    5750-3635 [employer]. [address] [place] [address]. All rights reserved. This information is not intended as a substitute for professional medical care. Always follow your healthcare professional's instructions.           Advance Directive  Patients advance directive was discussed and I am comfortable with the patients wishes.  Patient Education   Personalized Prevention Plan  You are due for the preventive services outlined below.  Your care team is available to assist you in scheduling these services.  If you have already completed any of these items, please share that information with your care team to update in your medical record.  Health Maintenance   Topic Date Due   ? MEDICARE ANNUAL WELLNESS VISIT  06/14/2002   ? ZOSTER VACCINES (2 of 3) 05/18/2011    ? TD 18+ HE  11/01/2018   ? DIABETIC EYE EXAM  12/19/2019   ? A1C  05/19/2020   ? DIABETIC FOOT EXAM  07/19/2020   ? LIPID  07/19/2020   ? MICROALBUMIN  07/19/2020   ? FALL RISK ASSESSMENT  07/19/2020   ? BMP  11/06/2020   ? DXA SCAN  01/03/2022   ? ADVANCE CARE PLANNING  05/11/2022   ? PNEUMOCOCCAL IMMUNIZATION 65+ LOW/MEDIUM RISK  Completed   ? INFLUENZA VACCINE RULE BASED  Completed

## 2021-06-17 NOTE — PATIENT INSTRUCTIONS - HE
Patient Instructions by Wyatt Ordonez MD at 2/11/2019  4:00 PM     Author: Wyatt Ordonez MD Service: -- Author Type: Physician    Filed: 2/11/2019  4:56 PM Encounter Date: 2/11/2019 Status: Addendum    : Wyatt Ordonez MD (Physician)    Related Notes: Original Note by Wyatt Ordonez MD (Physician) filed at 2/11/2019  4:56 PM       - Testing for Influenza A & B is negative.   - Discontinue doxycycline since your symptoms are not improving with this medication.   - Take the full course of Augmentin as prescribed.   - Take a probiotic while taking this antibiotic. This can be purchased over the counter at your local pharmacy.       Patient Education     Sinusitis (Antibiotic Treatment)    The sinuses are air-filled spaces within the bones of the face. They connect to the inside of the nose. Sinusitis is an inflammation of the tissue that lines the sinuses. Sinusitis can occur during a cold. It can also happen due to allergies to pollens and other particles in the air. Sinusitis can cause symptoms of sinus congestion and a feeling of fullness. A sinus infection causes fever, headache, and facial pain. There is often green or yellow fluid draining from the nose or into the back of the throat (post-nasal drip). You have been given antibiotics to treat this condition.  Home care    Take the full course of antibiotics as instructed. Do not stop taking them, even when you feel better.    Drink plenty of water, hot tea, and other liquids. This may help thin nasal mucus. It also may help your sinuses drain fluids.    Heat may help soothe painful areas of your face. Use a towel soaked in hot water. Or,  the shower and direct the warm spray onto your face. Using a vaporizer along with a menthol rub at night may also help soothe symptoms.     An expectorant with guaifenesin may help thin nasal mucus and help your sinuses drain fluids.    You can use an over-the-counter decongestant, unless a  similar medicine was prescribed to you. Nasal sprays work the fastest. Use one that contains phenylephrine or oxymetazoline. First blow your nose gently. Then use the spray. Do not use these medicines more often than directed on the label. If you do, your symptoms may get worse. You may also take pills that contain pseudoephedrine. Dont use products that combine multiple medicines. This is because side effects may be increased. Read labels. You can also ask the pharmacist for help. (People with high blood pressure should not use decongestants. They can raise blood pressure.)    Over-the-counter antihistamines may help if allergies contributed to your sinusitis.      Do not use nasal rinses or irrigation during an acute sinus infection, unless your healthcare provider tells you to. Rinsing may spread the infection to other areas in your sinuses.    Use acetaminophen or ibuprofen to control pain, unless another pain medicine was prescribed to you. If you have chronic liver or kidney disease or ever had a stomach ulcer, talk with your healthcare provider before using these medicines. (Aspirin should never be taken by anyone under age 18 who is ill with a fever. It may cause severe liver damage.)    Don't smoke. This can make symptoms worse.  Follow-up care  Follow up with your healthcare provider or our staff if you are better in 1 week.  When to seek medical advice  Call your healthcare provider if any of these occur:    Facial pain or headache that gets worse    Stiff neck    Unusual drowsiness or confusion    Swelling of your forehead or eyelids    Vision problems, such as blurred or double vision    Fever of 100.4 F (38 C) or higher, or as directed by your healthcare provider    Seizure    Breathing problems    Symptoms don't go away in 10 days  Prevention  Here are steps you can take to help prevent an infection:    Keep good hand washing habits.    Dont have close contact with people who have sore throats, colds,  or other upper respiratory infections.    Dont smoke, and stay away from secondhand smoke.    Stay up to date with of your vaccines.  Date Last Reviewed: 11/1/2017 2000-2017 [employer]. [address] [place] [address], [place] [address]. All rights reserved. This information is not intended as a substitute for professional medical care. Always follow your healthcare professional's instructions.

## 2021-06-17 NOTE — TELEPHONE ENCOUNTER
Refill Approved    Rx renewed per Medication Renewal Policy. Medication was last renewed on 9/24/2020 with 1 refill.  Last office visit: 4/30/2021 with PCP Dr LENNY Avila.     Aury English, [facility] Triage/Med Refill 5/6/2021     Requested Prescriptions   Pending Prescriptions Disp Refills     pioglitazone (ACTOS) 15 MG tablet [Pharmacy Med Name: PIOGLITAZONE 15MG TABLETS] 90 tablet 1     Sig: TAKE 1 TABLET(15 MG) BY MOUTH DAILY       Pioglitazone Protocol Passed - 5/6/2021  3:25 AM        Passed - Blood pressure in last 12 months     BP Readings from Last 1 Encounters:   04/30/21 108/56             Passed - LFT or AST or ALT in last 12 months     Albumin   Date Value Ref Range Status   09/28/2020 3.8 3.5 - 5.0 g/dL Final     Bilirubin, Total   Date Value Ref Range Status   09/28/2020 0.7 0.0 - 1.0 mg/dL Final     Bilirubin, Direct   Date Value Ref Range Status   07/21/2015 0.3 <=0.5 mg/dL Final     Alkaline Phosphatase   Date Value Ref Range Status   09/28/2020 73 45 - 120 U/L Final     AST   Date Value Ref Range Status   09/28/2020 20 0 - 40 U/L Final     ALT   Date Value Ref Range Status   09/28/2020 20 0 - 45 U/L Final     Protein, Total   Date Value Ref Range Status   09/28/2020 7.3 6.0 - 8.0 g/dL Final                Passed - Visit with PCP or prescribing provider visit in last 6 months      Last office visit with prescriber/PCP: 4/30/2021 OR same dept: 4/30/2021 Chuy Avila MD OR same specialty: 4/30/2021 Chuy Avila MD Last physical: Visit date not found Last MTM visit: Visit date not found         Next appt within 3 mo: Visit date not found  Next physical within 3 mo: Visit date not found  Prescriber OR PCP: Chuy Avila MD  Last diagnosis associated with med order: 1. Type 2 diabetes mellitus without complication, without long-term current use of insulin (H)  - pioglitazone (ACTOS) 15 MG tablet [Pharmacy Med Name: PIOGLITAZONE 15MG TABLETS]; TAKE 1 TABLET(15 MG) BY  MOUTH DAILY  Dispense: 90 tablet; Refill: 1     If protocol passes may refill for 12 months if within 3 months of last provider visit (or a total of 15 months).           Passed - A1C in last 6 months     Hemoglobin A1c   Date Value Ref Range Status   04/30/2021 7.1 (H) <=5.6 % Final               Passed - Microalbumin in last year     Microalbumin, Random Urine   Date Value Ref Range Status   09/29/2020 10.61 (H) 0.00 - 1.99 mg/dL Final                  Passed - Serum creatinine in last year     Creatinine   Date Value Ref Range Status   09/28/2020 1.25 (H) 0.60 - 1.10 mg/dL Final

## 2021-06-17 NOTE — PROGRESS NOTES
Assessment & Plan   Problem List Items Addressed This Visit     Type 2 diabetes mellitus (H) - Primary    Relevant Orders    Glycosylated Hemoglobin A1c    Mixed hyperlipidemia    Hypertension             #1.  Type 2 diabetes, degree of control be determined.  Check a hemoglobin A1c today.  I will call with results and further recommendations.    2.  Hyperlipidemia, on appropriate intensity statin therapy.  Lipids in September 2020 were excellent.    3.  Hypertension, well controlled.  Continue lisinopril.        No follow-ups on file.    Chuy Avila MD  [facility]   Silvia Avila is 85 y.o. and presents today for the following health issues     #1.  Type 2 diabetes, on Actos monotherapy due to side effects from Metformin.  She is checking her blood sugar once per day and it has been in the 130 range.  No hypoglycemia or need for assistance.  She denies any chest pain or tingling in her feet.    2.  Hyperlipidemia, currently on 40 mg of atorvastatin.  Lipids in 2020 were excellent at that time.    3.  Hypertension, on 10 mg of lisinopril.  Blood pressure is well controlled today.          Objective    /56   Pulse 92   Wt 149 lb (67.6 kg)   BMI 29.34 kg/m    Body mass index is 29.34 kg/m .  Physical Exam    Sensation is intact to monofilament testing in the feet bilaterally.

## 2021-06-19 NOTE — LETTER
Letter by Swapna Infante PharmD at      Author: Swapna Infante, Jodi Service: -- Author Type: --    Filed:  Encounter Date: 2019 Status: (Other)             2019      Silvia Avila  [address] [facility] [address]            Dear Silvia Avila     Thank you for talking with me on 19 about your health and medications. [facility] MTM (Medication Therapy Management) program helps you understand your medications and use them safely.    Along with this letter are an action plan (Medication Action Plan) and a medication list (Personal Medication List). The action plan has steps you should take to help you get the best results from your medications. The medication list will help you keep track of your medications and how to use them the right way.       Have your action plan and medication list with you when you talk with your doctors, pharmacists, and other health care providers.    Ask your doctors, pharmacists, and other healthcare providers to update them at every visit.     Take your medication list with you if you go to the hospital or emergency room.     Give a copy of the action plan and medication list to your family or caregivers.     If you want to talk about this letter or any of the papers with it, please call [phone]. We look forward to working with you, your doctors, and other healthcare providers to help you stay healthy.    Sincerely,   Swapna Infante    _  Medication Action Plan For Silvia Avila, : 1935     This action plan will help you get the best results from your medications if you:     1. Read What we talked about.   2. Take the steps listed in the What I need to do boxes.   3. Fill in What I did and when I did it.   4. Fill in My follow-up plan and Questions I want to ask.     Have this action plan with you when you talk with your doctors, pharmacists, and other healthcare providers in your care team. Share this with your family or caregivers too.    Date  Prepared: 8/28/2019      What we talked about:  Stopping aspirin.      What I need to do:  Because the risk of bleeding likely outweighs the benefit for you, I would recommend stopping aspirin.  What I did and when I did it:          What we talked about:  Metformin causing diarrhea     What I need to do:  Decrease metformin  mg to 1 tablet (500 mg) once daily with food.    What I did and when I did it:          What we talked about:  Changing time of day you take paroxetine     What I need to do:  Because paroxetine can sometimes make you tired, I would recommend taking this pill in the evening.  What I did and when I did it:         My follow-up plan (add notes about next steps):            Questions I want to ask (include topics about medications or therapy):          If you have any questions about your action plan, call Swapna Infante at [phone], Monday-Friday 8:00-4:30pm  _  This medication list was made for you after we talked. We also used information from your doctors chart.      Use blank rows to add new medications. Then fill in the dates you started using them.     Cross out medications when you no longer use them. Then write the date and why you stopped using them.     Ask your doctors, pharmacists, and other healthcare providers to update this list at every visit.  Keep this list up-to-date with:  ? prescription medications  ? over the counter drugs  ? herbals  ? vitamins  ? minerals          If you go to the hospital or emergency room, take this list with you. Share this with your family or caregivers too.    Date Prepared: 8/28/2019      Allergies or side effects: carrot      Medication: atorvastatin (LIPITOR) 40 MG tablet      How I use it: TAKE 1 TABLET(40 MG) BY MOUTH AT BEDTIME      Why I use it: Hyperlipidemia    Prescriber: Chuy Avila MD      Date I started using it:     Date I stopped using it:       Why I stopped using it:          Medication: CALCITRATE 200 mg (950 mg)  tablet      How I use it: TAKE 1 TABLET BY MOUTH TWICE DAILY      Why I use it: Osteoporosis    Prescriber: Chuy Avila MD      Date I started using it:     Date I stopped using it:       Why I stopped using it:          Medication: cholecalciferol, vitamin D3, (VITAMIN D3) 2,000 unit cap      How I use it: Take 1 capsule by mouth daily.      Why I use it: Osteoporosis    Prescriber: Chuy Avila MD      Date I started using it:     Date I stopped using it:       Why I stopped using it:          Medication: clobetasol (TEMOVATE) 0.05 % cream      How I use it: Apply 1 application topically 2 (two) times a day as needed. Apply daily as needed for eczema      Why I use it: Eczema    Prescriber: Chuy Avila MD      Date I started using it:     Date I stopped using it:       Why I stopped using it:          Medication: clobetasol (TEMOVATE) 0.05 % external solution      How I use it: Apply to scalp daily as needed      Why I use it: Eczema    Prescriber: Carlos Del Valle CNP      Date I started using it:     Date I stopped using it:       Why I stopped using it:          Medication: denosumab 60 mg (PROLIA 60 mg/ml)      How I use it: Inject 60 mg subcutaneously every 6 months      Why I use it: Osteoporosis     Prescriber: Nadia Atkins NP      Date I started using it:     Date I stopped using it:       Why I stopped using it:          Medication: ferrous gluconate (FERGON) 324 MG tablet      How I use it: TAKE 1 TABLET(324 MG) BY MOUTH DAILY WITH BREAKFAST      Why I use it:  Anemia    Prescriber: Chuy Avila MD      Date I started using it:     Date I stopped using it:       Why I stopped using it:          Medication: folic acid (FOLVITE) 1 MG tablet      How I use it: Take 1 mg by mouth daily.       Why I use it: Anemia    Prescriber:       Date I started using it:     Date I stopped using it:       Why I stopped using it:          Medication: LACTOBACILLUS ACIDOPHILUS  (PROBIOTIC ORAL)      How I use it: Take 1 tablet by mouth daily.       Why I use it: Probiotic for gut health    Prescriber: Chuy Avila MD      Date I started using it:     Date I stopped using it:       Why I stopped using it:          Medication: lancets (ONETOUCH DELICA LANCETS) 33 gauge Misc      How I use it: Test twice daily      Why I use it: Diabetes Mellitus    Prescriber: Chuy Avila MD      Date I started using it:     Date I stopped using it:       Why I stopped using it:          Medication: lisinopril (PRINIVIL,ZESTRIL) 10 MG tablet      How I use it: Take 1 tablet (10 mg total) by mouth daily.      Why I use it: Blood pressure    Prescriber: Chuy Avila MD      Date I started using it:     Date I stopped using it:       Why I stopped using it:          Medication: metFORMIN (GLUCOPHAGE-XR) 500 MG 24 hr tablet      How I use it: TAKE 1 TABLET(500 MG) BY MOUTH ONCE DAILY WITH FOOD      Why I use it: Type 2 diabetes     Prescriber: Carlos Del Valle CNP      Date I started using it:     Date I stopped using it:       Why I stopped using it:          Medication: nystatin (MYCOSTATIN) cream      How I use it: Apply to Perineum as needed      Why I use it: Yeast infection    Prescriber: Chuy Avila MD      Date I started using it:     Date I stopped using it:       Why I stopped using it:          Medication: omeprazole (PRILOSEC) 20 MG capsule      How I use it: TAKE 1 CAPSULE(20 MG) BY MOUTH DAILY BEFORE BREAKFAST      Why I use it: Esophageal Reflux    Prescriber: Carlos Del Valle CNP      Date I started using it:     Date I stopped using it:       Why I stopped using it:          Medication: PARoxetine (PAXIL) 20 MG tablet      How I use it: Take 1 tablet (20 mg total) by mouth daily in the evening.       Why I use it: Anxiety State    Prescriber: Chuy Avila MD      Date I started using it:     Date I stopped using it:       Why I stopped using it:           Medication:       How I use it:       Why I use it:     Prescriber:       Date I started using it:     Date I stopped using it:       Why I stopped using it:          Medication:       How I use it:       Why I use it:     Prescriber:       Date I started using it:     Date I stopped using it:       Why I stopped using it:          Medication:       How I use it:       Why I use it:     Prescriber:       Date I started using it:     Date I stopped using it:       Why I stopped using it:          Other Information:      If you have any questions about your medication list, call [phone]    According to the Paperwork Reduction Act of 1995, no persons are required to respond to a collection of information unless it displays a valid OMB control number. The valid OMB number for this information collection is [id number]. The time required to complete this information collection is estimated to average 37.76 minutes per response, including the time to review instructions, searching existing data resources, gather the data needed, and complete and review the information collection. If you have any comments concerning the accuracy of the time estimate(s) or suggestions for improving this form, please write to: CMS, Attn: CANDACE Reports Clearance Officer, [address] [place] [phone].

## 2021-06-19 NOTE — PROGRESS NOTES
ASSESSMENT and PLAN:  1. Type 2 diabetes mellitus (H)  Her sugars look to be running a little bit higher off of metformin.  We will have her try extended release metformin.  We will use this very low dose.  I have also recommended that she take it at suppertime to see if that helps.  I would like to see her back in 3 months since we have changed her medication a bit.  If she has side effects of the extended release, she will contact us earlier.  - Glycosylated Hemoglobin A1c  - metFORMIN (GLUCOPHAGE XR) 500 MG 24 hr tablet; Take 1 tablet (500 mg total) by mouth daily with supper.  Dispense: 90 tablet; Refill: 1    2. Hypertension  This has been stable and under good control.  We will check her electrolytes and kidney function today.  - Comprehensive Metabolic Panel    3. Mixed hyperlipidemia  Stable on atorvastatin.  She's fasting for a recheck.  - Comprehensive Metabolic Panel  - Lipid Cascade    4. Diarrhea, unspecified type  Uncertain etiology.  It seems to be better off of metformin.  I agree with her that anxiety might be contributing.  She'll monitor her sx      Medications Discontinued During This Encounter   Medication Reason     metFORMIN (GLUCOPHAGE) 500 MG tablet      nystatin (MYCOSTATIN) cream Reorder       No Follow-up on file.    Patient Instructions   Try low dose extended release metformin; I sent that to your pharmacy.  Try taking at dinner time to see if that helps too.    Monitor your episodes of diarrhea and see if they seem to correlate with more stressful interactions with your sister or brother.    Let us plan on a 3 month follow-up, but see me earlier if you have more difficulty with diarrhea or any anxiety as a caregiver.    Today's labs will be available on [employer].  If you aren't signed up, then we'll mail them out.  If anything needs more immediate follow up, we'll also call.        CHIEF COMPLAINT:  Chief Complaint   Patient presents with     Diabetes     fasting, stopped metformin 1 wk  ago due to diarrhea       HISTORY OF PRESENT ILLNESS:  Silvia Avila is a 83 y.o. female  presenting to the clinic today for follow-up of her type 2 diabetes and diarrhea.  She had been on regular release metformin 500 mg twice daily.  She saw Joey Del Valle last week.  They elected to have her try stopping metformin to see if that was contributing.  Since then, she is only had one episode of looser stools.  She thinks that this is an improvement.  She feels that her blood sugars are starting to creep up off of the Metformin.  She is noticing more in the 160s consistently now where as previously she had been more in the 140s.  We reviewed her blood sugar log today.  She does not think she is ever tried extended release metformin.    She has hypertension.  Her recent blood pressure readings have shown she is under good control.  She is on 10 mg once daily of lisinopril.    She has hyperlipidemia.  She is on atorvastatin.  We reviewed her cholesterol from earlier this year.  She is fasting today and would like to have that repeated.    She has been under some stress.  She has a younger sister who has been diagnosed with dementia.  With that, her sister had a complete personality change.  A lot of anger has been directed toward Silvia.  She does not think that she is anxious but she is wondering if potentially some of the emotional stress could be manifesting as diarrhea.  She has been going to her sister's office visits.    REVIEW OF SYSTEMS:   MSK: she's had some tingling in her toes (stable)   All other systems are negative.    PFSH:  Family History   Problem Relation Age of Onset     No Medical Problems Mother      No Medical Problems Father      Dementia Sister      Dementia Brother            TOBACCO USE:  History   Smoking Status     Never Smoker   Smokeless Tobacco     Never Used       VITALS:  Vitals:    08/15/18 1040   BP: 112/58   Pulse: 76   Resp: 12   Temp: 98.6  F (37  C)   SpO2: 97%   Weight: 131 lb 4.8  oz (59.6 kg)     Wt Readings from Last 3 Encounters:   08/15/18 131 lb 4.8 oz (59.6 kg)   08/07/18 132 lb (59.9 kg)   03/15/18 137 lb (62.1 kg)     PHYSICAL EXAM:  Constitutional:  Reveals an alert, pleasant adult female.   Vitals:  Noted.   Lungs: Clear to auscultation bilaterally, respirations unlabored.   Heart: Regular rate and rhythm, S1 and S2 normal, no murmur, rub, or gallop  Abdomen: Soft, non-tender, bowel sounds active  Extremities:no lesions on her feet bilaterally   Skin: Skin color, texture, turgor normal, no rashes or lesions on exposed areas    QUALITY MEASURES:  The following high BMI interventions were performed this visit: weight monitoring    MEDICATIONS:  Current Outpatient Prescriptions   Medication Sig Dispense Refill     ammonium lactate (AMLACTIN) 12 % cream APPLY PRN TO HANDS D  10     aspirin 81 mg chewable tablet Chew 81 mg daily.       atorvastatin (LIPITOR) 40 MG tablet Take 1 tablet (40 mg total) by mouth at bedtime. 90 tablet 3     CALCITRATE 200 mg (950 mg) tablet TAKE 1 TABLET BY MOUTH TWICE DAILY (Patient taking differently: TAKE 1 TABLET BY MOUTH DAILY) 200 tablet 3     cholecalciferol, vitamin D3, (VITAMIN D3) 2,000 unit cap Take 1 capsule by mouth daily.       clobetasol (TEMOVATE) 0.05 % cream Apply 1 application topically 2 (two) times a day as needed. Apply daily as needed for eczema       docusate sodium (COLACE) 100 MG capsule Take 100 mg by mouth daily.        ferrous gluconate (FERGON) 324 MG tablet TAKE 1 TABLET(324 MG) BY MOUTH DAILY WITH BREAKFAST 90 tablet 0     fluocinonide (LIDEX) 0.05 % external solution Apply 1 application topically daily as needed. Apply to scalp       folic acid (FOLVITE) 1 MG tablet Take 400 mcg by mouth daily.        LACTOBACILLUS ACIDOPHILUS (PROBIOTIC ORAL) Take 1 tablet by mouth daily.        lancets (ONETOUCH DELICA LANCETS) 33 gauge Misc Test twice daily 200 each 2     lisinopril (PRINIVIL,ZESTRIL) 10 MG tablet Take 1 tablet (10 mg total)  by mouth daily. 90 tablet 0     nystatin (MYCOSTATIN) cream Perineum as needed 30 g 0     omeprazole (PRILOSEC) 20 MG capsule Take 1 capsule (20 mg total) by mouth daily before breakfast. 90 capsule 3     PARoxetine (PAXIL) 20 MG tablet Take 1 tablet (20 mg total) by mouth daily. 90 tablet 3     promethazine-codeine (PHENERGAN WITH CODEINE) 6.25-10 mg/5 mL syrup Take 5 mL by mouth every 4 (four) hours as needed for cough. 240 mL 0     metFORMIN (GLUCOPHAGE XR) 500 MG 24 hr tablet Take 1 tablet (500 mg total) by mouth daily with supper. 90 tablet 1     Current Facility-Administered Medications   Medication Dose Route Frequency Provider Last Rate Last Dose     denosumab 60 mg (PROLIA 60 mg/ml)  60 mg Subcutaneous Q6 Months Ilene Granados MD   60 mg at 07/28/16 5849

## 2021-06-19 NOTE — PROGRESS NOTES
Clinic Note    Assessment:     Assessment and Plan:  1. Diarrhea  I told the patient that I think it is reasonable for her to discontinue her metformin.  I did tell her that she needs to log her BG measurements over the next week and bring these with her to her PCP for review.  It is difficult to say whether or not her intermittent diarrhea is due to her metformin because of the frequency of her symptoms.  When we see her back, we will have a good idea of where her BG is at so that we could prescribe an alternative oral medication at that time if necessary.     Patient Instructions   I think it is reasonable for you to discontinue your metformin.    Make sure to continue logging your blood sugarsAt home.  Bring this information with you to your appointment with Dr. Avila next week.    In the meantime, try and be cognizant about the different foods that you are eating.  If you do continue to have issues with diarrhea, try and think about recent foods or activities that he participated in.    If you need to be seen on a short-term basis, you can always schedule appointment to come back and see me.    No Follow-up on file.         Subjective:      Silvia Avila is a 83 y.o. female who comes the clinic today for diarrhea.    Patient states that she has been using metformin for years.  Within the last 6 months she had her metformin dose increased to 500 mg twice daily.  Patient states that she does have intermittent problems with diarrhea, ranging from 3 times per week to once every 3 weeks.  She is not sure if it is related to her metformin use, or if it is related to any type of dietary intake.  She does notice that when she has Chinese food that this will make her symptoms worse.  She denies any fevers.  No abdominal pain.  No mucus in her stools.  No blood in her stools.  No black or  tarry stools.  Patient states that she has not taken her metformin in 3 days.  She has a follow-up appointment with her PCP in  about 1 week and is wondering if it is reasonable to continue with holding the medication.    The following portions of the patient's history were reviewed and updated as appropriate: Allergies, medications, problem list, prior note.     Review of Systems:    Review is otherwise negative except for what is mentioned above.     Social Hx:    History   Smoking Status     Never Smoker   Smokeless Tobacco     Never Used         Objective:     Vitals:    08/07/18 1435   BP: 118/62   Pulse: 64   Weight: 132 lb (59.9 kg)       Exam:    General: No apparent distress. Calm. Alert and Oriented X3. Pt behavior is appropriate.        Patient Active Problem List   Diagnosis     Eczema     Osteoporosis     Anemia     Type 2 diabetes mellitus (H)     Esophageal reflux     Anxiety     Hypertension     Mixed hyperlipidemia     Lumbar radiculopathy     IgA monoclonal gammopathy of uncertain significance     Malignant neoplasm of left breast (H)     Hallux abducto valgus, left     Current Outpatient Prescriptions   Medication Sig Dispense Refill     ammonium lactate (AMLACTIN) 12 % cream APPLY PRN TO HANDS D  10     aspirin 81 mg chewable tablet Chew 81 mg daily.       atorvastatin (LIPITOR) 40 MG tablet Take 1 tablet (40 mg total) by mouth at bedtime. 90 tablet 3     CALCITRATE 200 mg (950 mg) tablet TAKE 1 TABLET BY MOUTH TWICE DAILY (Patient taking differently: TAKE 1 TABLET BY MOUTH DAILY) 200 tablet 3     cholecalciferol, vitamin D3, (VITAMIN D3) 2,000 unit cap Take 1 capsule by mouth daily.       clobetasol (TEMOVATE) 0.05 % cream Apply 1 application topically 2 (two) times a day as needed. Apply daily as needed for eczema       docusate sodium (COLACE) 100 MG capsule Take 100 mg by mouth daily.        ferrous gluconate (FERGON) 324 MG tablet TAKE 1 TABLET(324 MG) BY MOUTH DAILY WITH BREAKFAST 90 tablet 0     fluocinonide (LIDEX) 0.05 % external solution Apply 1 application topically daily as needed. Apply to scalp       folic  acid (FOLVITE) 1 MG tablet Take 400 mcg by mouth daily.        LACTOBACILLUS ACIDOPHILUS (PROBIOTIC ORAL) Take 1 tablet by mouth daily.        lancets (ONETOUCH DELICA LANCETS) 33 gauge Misc Test twice daily 200 each 2     lisinopril (PRINIVIL,ZESTRIL) 10 MG tablet Take 1 tablet (10 mg total) by mouth daily. 90 tablet 0     metFORMIN (GLUCOPHAGE) 500 MG tablet Take 1 tablet (500 mg total) by mouth 2 (two) times a day with meals. 180 tablet 3     nystatin (MYCOSTATIN) cream Apply 1 application topically as needed for dry skin. Perineum as needed       omeprazole (PRILOSEC) 20 MG capsule Take 1 capsule (20 mg total) by mouth daily before breakfast. 90 capsule 3     PARoxetine (PAXIL) 20 MG tablet Take 1 tablet (20 mg total) by mouth daily. 90 tablet 3     promethazine-codeine (PHENERGAN WITH CODEINE) 6.25-10 mg/5 mL syrup Take 5 mL by mouth every 4 (four) hours as needed for cough. 240 mL 0     Current Facility-Administered Medications   Medication Dose Route Frequency Provider Last Rate Last Dose     denosumab 60 mg (PROLIA 60 mg/ml)  60 mg Subcutaneous Q6 Months Ilene Granados MD   60 mg at 07/28/16 1132       I spent 20 minutes with patient face to face, of which >50% was counseling regarding the above plan       Carlos Del Valle (Rob), SPEEDY    8/7/2018

## 2021-06-20 ENCOUNTER — HEALTH MAINTENANCE LETTER (OUTPATIENT)
Age: 86
End: 2021-06-20

## 2021-06-22 NOTE — PROGRESS NOTES
[facility]  ENDOCRINOLOGY    Osteoporosis Follow Up 12/11/2018    Silvia Avila, 1935, [id number]          Reason for visit      1. Osteoporosis without current pathological fracture, unspecified osteoporosis type        History     Silvia Avila is a very pleasant 83 y.o. old female who presents for follow up.   SUMMARY:  Silvia Avila did have a breast cancer in 1997 treated with surgery radiation chemotherapy and she has been on Evista since that time.  She then had a follow-up bone density test done in June 2014 and at that time it showed that she was significantly below baseline and trabecular bone score was poor.  Her fracture risk was calculated to be high and in view of her decline on raloxifene. She is here to consider alternative treatment. She has a history of significant GI distress and requiring daily H2 blocker use and her calculated fracture risk would be considered high at this point.  I believe in the context of her current bone density including the low trabecular bone score she would be a candidate for denosumab.  We had a lengthy discussion about this and she is in agreement.    TODAY:  Silvia returns today in f/u for Osteoporosis.  She has continued on the Denosumab injections without difficulty.  She is due for one today. Her last Dexa Scan from 2017, indicated that she was stable and hadn't lost any ground, in spite of being without treatment for a year.  It was decided at her last appointment that she would return to therapy.  Her current Vit D level is good at 66.9, and Calcium level is 10.  She is walking regularly.              Past Medical History     Patient Active Problem List   Diagnosis     Eczema     Osteoporosis     Anemia     Type 2 diabetes mellitus (H)     Esophageal reflux     Anxiety     Hypertension     Mixed hyperlipidemia     Lumbar radiculopathy     IgA monoclonal gammopathy of uncertain significance     Malignant neoplasm of left breast (H)     Hallux abducto  "valgus, left       Family History       family history includes Dementia in her brother and sister; No Medical Problems in her father and mother.    Social History      reports that  has never smoked. she has never used smokeless tobacco. She reports that she drinks about 1.2 oz of alcohol per week. She reports that she does not use drugs.      Review of Systems     Patient denies current pain, limited mobility, fractures.   Remainder per HPI.      Vital Signs     /64 (Patient Site: Right Arm, Patient Position: Sitting, Cuff Size: Adult Regular)   Pulse 78   Ht 4' 11.5\" (1.511 m)   Wt 136 lb 3.2 oz (61.8 kg)   BMI 27.05 kg/m      Physical Exam     GENERAL:  Normal, NIRD  EYES:  Pupils equal, round and reactive to light; no proptosis, lid lag or  periorbital edema.  THYROID:  Thyroid is normal.  No tenderness or bruit  NECK: No lymph nodes  MUSCULOSKELETAL: No joint abnormalities, FROM in all four extremities. No kyphosis. Muscle strength grossly normal without evidence of wasting.  HEART:  Regular rate and rhythm without murmur.  LUNGS:  Clear to auscultation.  ABDOMEN:Soft, non-tender, no masses or organomegaly  NEURO:  Patella Reflexes were normal.No tremors  SKIN:  No acanthosis nigricans or vitiligo        Assessment     1. Osteoporosis without current pathological fracture, unspecified osteoporosis type        Plan     Pt is due for Prolia injection today, and was given by myself without difficulty.  Appointment made for her next one in 6 months.  She will f/u with me in 1 year.  She is also due for her next Dexa Scan next Fall and orders placed.        Total visit minutes:25  Time spent counseling and coordination of care:23    Nadia STEPHENS Endocrinology  12/11/2018  8:05 AM          Current Medications     Outpatient Medications Prior to Visit   Medication Sig Dispense Refill     ammonium lactate (AMLACTIN) 12 % cream APPLY PRN TO HANDS D  10     aspirin 81 mg chewable tablet Chew 81 mg " daily.       atorvastatin (LIPITOR) 40 MG tablet TAKE 1 TABLET(40 MG) BY MOUTH AT BEDTIME 90 tablet 2     CALCITRATE 200 mg (950 mg) tablet TAKE 1 TABLET BY MOUTH TWICE DAILY (Patient taking differently: TAKE 1 TABLET BY MOUTH ONCE DAILY) 200 tablet 3     cholecalciferol, vitamin D3, (VITAMIN D3) 2,000 unit cap Take 1 capsule by mouth daily.       clobetasol (TEMOVATE) 0.05 % cream Apply 1 application topically 2 (two) times a day as needed. Apply daily as needed for eczema       docusate sodium (COLACE) 100 MG capsule Take 100 mg by mouth daily.        ferrous gluconate (FERGON) 324 MG tablet TAKE 1 TABLET(324 MG) BY MOUTH DAILY WITH BREAKFAST 90 tablet 0     fluocinonide (LIDEX) 0.05 % external solution Apply 1 application topically daily as needed. Apply to scalp       folic acid (FOLVITE) 1 MG tablet Take 400 mcg by mouth daily.        LACTOBACILLUS ACIDOPHILUS (PROBIOTIC ORAL) Take 1 tablet by mouth daily.        lancets (ONETOUCH DELICA LANCETS) 33 gauge Misc Test twice daily 200 each 2     lisinopril (PRINIVIL,ZESTRIL) 10 MG tablet Take 1 tablet (10 mg total) by mouth daily. 90 tablet 3     metFORMIN (GLUCOPHAGE XR) 500 MG 24 hr tablet Take 1 tablet (500 mg total) by mouth daily with supper. (Patient taking differently: Take 500 mg by mouth 2 (two) times a day .      ) 90 tablet 1     nystatin (MYCOSTATIN) cream Perineum as needed 30 g 0     omeprazole (PRILOSEC) 20 MG capsule Take 1 capsule (20 mg total) by mouth daily before breakfast. 90 capsule 3     PARoxetine (PAXIL) 20 MG tablet Take 1 tablet (20 mg total) by mouth daily. 90 tablet 3     promethazine-codeine (PHENERGAN WITH CODEINE) 6.25-10 mg/5 mL syrup Take 5 mL by mouth every 4 (four) hours as needed for cough. 240 mL 0     denosumab 60 mg (PROLIA 60 mg/ml)        No facility-administered medications prior to visit.          Lab Results     PTH   Date Value Ref Range Status   01/21/2015 44 10 - 86 pg/mL Final     Calcium   Date Value Ref Range  Status   10/19/2018 10.0 8.5 - 10.5 mg/dL Final     Iron   Date Value Ref Range Status   03/15/2018 95 42 - 175 ug/dL Final           Imaging Results   Last DEXA scan:  Results for orders placed in visit on 12/12/17   DXA Bone Density Scan    Narrative 12/12/2017      RE: Silvia Avila  YOB: 1935        Dear Sheri Avila,    Patient Profile:  82 y.o. female, postmenopausal, is here for the follow up bone density   test.   History of fractures - None. Family history of osteoporosis - None.    Family history of hip fracture: None. Smoking history - No. Osteoporosis   treatment past -  Yes;  HRT, Evista and Prolia. Osteoporosis treatment   current - No.  Chronic medical problems - Breast cancer, Chronic low back   problems, Diabetes Mellitus and Spine surgery. High risk medications -    Chemotherapy;  Yes, in the Past and Aromatase Inhibitor;  Yes, in the   Past.      Assessment:    1. The spine bone density L1-L2 with T-score -1.6, stable compared to   2015.  2. Femoral bone densities show left femoral neck T- score -1.6 and right   femoral neck T-score -1.8, stable.  3. Trabecular bone score indicates moderate trabecular bone architecture.      82 y.o. female with LOW BONE DENSITY (OSTEOPENIA) and HIGH fracture risk,   adjusted for the TBS, with major osteoporotic fracture risk 15.9% and hip   fracture risk 4.4%.         Recommendations:  Appropriate evaluation and treatment recommended with follow up bone   density scan in 1 year.      Bone densitometry was performed on your patient using our [employer]ard[employer]   densitometer. The results are summarized and a copy of the actual scans   are included for your review. In conformity with the International Society   of Clinical Densitometry's most recent position statement for DXA   interpretation (2015), the diagnosis will be made on the lowest measured   T-score of the lumbar spine, femoral neck, total proximal femur or 33%   radius. Note the change in  terminology for diagnostic classification from   OSTEOPENIA to LOW BONE MASS. All trending for sequential exams will be   done using multiple vertebrae or the total proximal femur. Fracture risk   is based on the WHO Fracture Risk Assessment Tool (FRAX). If additional   information is needed or if you would like to discuss the results, please   do not hesitate to call me.       Thank you for referring this patient to [facility].   We are happy to be of service in support of you and your practice. If you   have any questions or suggestions to improve our service, please call me   at [phone].     Sincerely,     Michelle Zapata M.D. C.C.ADAM.  Osteoporosis Services, [facility]

## 2021-06-23 NOTE — TELEPHONE ENCOUNTER
Medication Question or Clarification  Who is calling: Patient  What medication are you calling about?: Metformin   What dose do you take ?:  Need clarification on DOSE   How often are you taking the medication ?:  ???   Who prescribed the medication ?:  Sheri Avila MD  What is your question/concern ?:  PLEASE CALL AN advise dose- Pt taking 2 times daily chart say 1 daily .   Pharmacy:  [place], [place]  Okay to leave a detailed message?: Yes  Site CMT - Please call the pharmacy to obtain any additional needed information.        metFORMIN (GLUCOPHAGE-XR) 500 MG 24 hr tablet 90 tablet 1 1/10/2019     Sig: TAKE 1 TABLET(500 MG) BY MOUTH DAILY WITH SUPPER    Sent to pharmacy as: metFORMIN (GLUCOPHAGE-XR) 500 MG 24 hr tablet    E-Prescribing Status: Receipt confirmed by pharmacy (1/10/2019  9:27 PM CST)

## 2021-06-23 NOTE — TELEPHONE ENCOUNTER
Patient is out of her Metformin. She states that the pharmacy told her that she was to take 500 mg ER daily, but patient reports getting a Sunnovat message telling her to take 500 mg ER twice day. Blood glucose this am 118.    She states her running blood sugars have been very well controlled. She was given two tablets to get her through the weekend, but would like provider to clarify order. She states she is due for an appointment and would like to be scheduled.     Advised she take the one tablet each day until Monday when she can be seen by one of the clinic providers.     She agrees.     Warm transfer to scheduling to assist patient.     Reason for Disposition    Caller has medication question only, adult not sick, and triager answers question    Protocols used: MEDICATION QUESTION CALL-A-BARAK Guy RN [facility] Triage

## 2021-06-23 NOTE — PROGRESS NOTES
Subjective:   Silvia Avila is a(n) 83 y.o. White or  female who presents to [facility] with the following complaint(s):  Sinus Problem (Not getting better )    History of Present Illness:  Primary symptom: Sinus problem  Onset: 2 weeks ago  Progression: Initially improving, now worsening over the past 2-3 days  Congestion: Yes  Nasal discharge: Yes, yellow / green, bloody at times  Dental pain: Yes  Maxillary sinus pressure: Yes  Frontal sinus pressure: Yes  Headache: Yes  Fevers: No  Chills: Yes  Additional symptoms: Had a sore throat, but this has resolved. Continues to have post-nasal drip. Feels achy and fatigued over the past 2-3 days. Is concerned that she has Influenza in addition to sinusitis.   Home therapies utilized: Dayquil, Nyquil, Flonase, ibuprofen, and nasal saline. Was prescribed doxycycline on 2/5/2019 for treatment of sinusitis and has been taking this as directed.   History of sinusitis: Yes  History of sinus surgery: No  Ill contacts: Grandchildren have been ill.  was also ill but is now feeling better.   Tobacco user / exposure: No    The following portions of the patient's history were reviewed and updated as appropriate: allergies, current medications, past family history, past medical history, past social history, past surgical history and problem list.    Review of Systems:   Review of Systems   All other systems reviewed and are negative.    Objective:     Vitals:    02/11/19 1605   BP: 120/72   Patient Site: Right Arm   Patient Position: Sitting   Cuff Size: Adult Regular   Pulse: 94   Resp: 18   Temp: 97.9  F (36.6  C)   TempSrc: Oral   SpO2: 97%   Weight: 132 lb (59.9 kg)     Physical Exam   Constitutional: She is oriented to person, place, and time. She appears well-developed and well-nourished.  Non-toxic appearance. No distress.   HENT:   Head: Normocephalic and atraumatic.   Right Ear: Tympanic membrane, external ear and ear canal normal.   Left Ear: Tympanic  membrane, external ear and ear canal normal.   Nose: No mucosal edema or rhinorrhea. Right sinus exhibits maxillary sinus tenderness and frontal sinus tenderness. Left sinus exhibits maxillary sinus tenderness and frontal sinus tenderness.   Mouth/Throat: Uvula is midline, oropharynx is clear and moist and mucous membranes are normal. No oral lesions.   Eyes: Conjunctivae and lids are normal.   Neck: Neck supple.   Cardiovascular: Normal rate, regular rhythm, S1 normal and S2 normal. Exam reveals no gallop and no friction rub.   No murmur heard.  Pulmonary/Chest: Effort normal and breath sounds normal. No stridor. She has no wheezes. She has no rhonchi. She has no rales.   Lymphadenopathy:     She has no cervical adenopathy.   Neurological: She is alert and oriented to person, place, and time.   Skin: Skin is warm and dry. No rash noted. No pallor.   Nursing note and vitals reviewed.    Laboratory:  N/A    Radiology:  N/A    Electrocardiogram:  N/A    Assessment/Plan   1. Acute non-recurrent pansinusitis  - amoxicillin-clavulanate (AUGMENTIN) 875-125 mg per tablet; Take 1 tablet by mouth 2 (two) times a day for 10 days.  Dispense: 20 tablet; Refill: 0    2. Chills  - Influenza A/B Rapid Test- Nasal Swab    3. Body aches  - Influenza A/B Rapid Test- Nasal Swab    - Reviewed negative influenza result with patient. Recommended discontinuing doxycycline and starting Augmentin in its place since sinus symptoms continue to worsen despite taking doxycycline as directed. Discussed symptomatic / supportive cares.   - Counseled patient regarding assessment and plan for evaluation and treatment. Questions were answered. See AVS for the specific written instructions and educational handout(s) regarding sinusitis that were provided at the conclusion of the visit.   - Discussed signs / symptoms that warrant urgent / emergent medical attention.   - Follow up as needed.     Wyatt Ordonez MD

## 2021-06-23 NOTE — PATIENT INSTRUCTIONS - HE
Continue taking your metformin 500 mg twice daily with meals.  I sent in a new prescription for you today.    I sent in a refill of your clobetasol solution as well.    We will recheck your hemoglobin A1c today.    Call your insurance company and inquire about the coverage for the new shingles vaccine.  Call and ask them about coverage for the tetanus vaccination.    Recall [phone] to schedule your DEXA scan.  I would talk to Nadia Atkins about any specific questions you may have pertaining to the scan.    Follow-up with either I or Dr. Sheri Avila in 3 months for recheck of your diabetes.

## 2021-06-23 NOTE — PROGRESS NOTES
Clinic Note    Assessment:     Assessment and Plan:  1. Acute maxillary sinusitis, recurrence not specified  Prescription of doxycycline was sent in.  She was treated successfully with Doxy in March 2018.  She was instructed to follow-up if she is not seeing improvement over the next 10 days or so.       Patient Instructions   I sent in a prescription for doxycycline to your pharmacy.  Take 1 tablet every 12 hours for the next 10 days.    Monitor your symptoms over the next week or so.  If you are not feeling better after 10 days, you should come back to see me.    Make sure to stay well-hydrated and drink plenty of water.  Tylenol for aches and pains    Return for If symptoms do not start to improve in two weeks..         Subjective:      Patient comes to clinic today for upper respiratory illness.    Symptoms started over 1 week ago.  Seem to be getting progressively worse.    Started with nasal congestion, gradually worsened into a productive cough.  Symptoms seem to be worse at night.  She feels drainage down the back of her throat.  Having some slight sinus pain in her maxillary and frontal sinuses.  Having lots of mucopurulent drainage from her nose.  Slightly sore throat.   was sick at home but he recovered fairly quickly.    No fevers.    She was treated successfully with bacterial sinusitis in March 2018 with doxycycline.  She is requesting a similar antibiotic at this time.    The following portions of the patient's history were reviewed and updated as appropriate: Allergies, medications, problem list, prior note.     Review of Systems:    Review is otherwise negative except for what is mentioned above.     Social Hx:    Social History     Tobacco Use   Smoking Status Never Smoker   Smokeless Tobacco Never Used         Objective:     Vitals:    02/05/19 1422   BP: 100/60   Pulse: 90   Temp: 97.8  F (36.6  C)   TempSrc: Oral   SpO2: 95%   Weight: 131 lb 6.4 oz (59.6 kg)       Exam:    General: No  apparent distress. Calm. Alert and Oriented X3. Pt behavior is appropriate.  Head:Atraumatic. Normocephalic, mild maxillary sinus pain with palpation bilaterally  Neck: Supple. No JVD. Full ROM.   Eyes: PERRL, No discharge. No strabismus. No nystagmus.  Ears: TMs pearly gray with landmarks visible.   Nose/Mouth/Throat: Patent nares, no oral lesions, pharynx erythematous but without exudate. Uvula mid-line. Nasal septum mid-line. Clear turbinates.   Lymph: No axillar or cervical adenopathy.   Chest/Lungs: Normal chest wall, clear to auscultation, normal respiratory effort and rate.   Heart/Pulses: Regular rate and rhythm, strong and equal radial pulses, no murmurs. Capillary refill <2 seconds. No edema.   Abdomen: Soft, no palpable masses. No hepatosplenomegaly, no tenderness with palpation noted. Bowel sounds active in all quadrants. No increased tympany.   Genitalia: Not examined.   Musculoskeletal: No CVA tenderness with palpation. Good ROM with extremities.   Neurologic: Interactive, alert, no focal findings, CNs intact.   Skin: Warm, dry. Normal hair pattern. Free of lesions. Normal skin turgor.       Patient Active Problem List   Diagnosis     Eczema     Osteoporosis     Anemia     Type 2 diabetes mellitus (H)     Esophageal reflux     Anxiety     Hypertension     Mixed hyperlipidemia     Lumbar radiculopathy     IgA monoclonal gammopathy of uncertain significance     Malignant neoplasm of left breast (H)     Hallux abducto valgus, left     Personal history of breast cancer     Bilateral sensorineural hearing loss     Gastroesophageal reflux disease     HTN (hypertension)     Hyperlipidemia     Osteopenia     Diabetes mellitus (H)     Current Outpatient Medications   Medication Sig Dispense Refill     ammonium lactate (AMLACTIN) 12 % cream APPLY PRN TO HANDS D  10     aspirin 81 mg chewable tablet Chew 81 mg daily.       atorvastatin (LIPITOR) 40 MG tablet TAKE 1 TABLET(40 MG) BY MOUTH AT BEDTIME 90 tablet 2      CALCITRATE 200 mg (950 mg) tablet TAKE 1 TABLET BY MOUTH TWICE DAILY (Patient taking differently: TAKE 1 TABLET BY MOUTH ONCE DAILY) 200 tablet 3     cholecalciferol, vitamin D3, (VITAMIN D3) 2,000 unit cap Take 1 capsule by mouth daily.       clobetasol (TEMOVATE) 0.05 % cream Apply 1 application topically 2 (two) times a day as needed. Apply daily as needed for eczema       clobetasol (TEMOVATE) 0.05 % external solution Apply to scalp as needed 50 mL 0     docusate sodium (COLACE) 100 MG capsule Take 100 mg by mouth daily.        ferrous gluconate (FERGON) 324 MG tablet TAKE 1 TABLET(324 MG) BY MOUTH DAILY WITH BREAKFAST 90 tablet 0     fluocinonide (LIDEX) 0.05 % external solution Apply 1 application topically daily as needed. Apply to scalp       folic acid (FOLVITE) 1 MG tablet Take 400 mcg by mouth daily.        LACTOBACILLUS ACIDOPHILUS (PROBIOTIC ORAL) Take 1 tablet by mouth daily.        lancets (ONETOUCH DELICA LANCETS) 33 gauge Misc Test twice daily 200 each 2     lisinopril (PRINIVIL,ZESTRIL) 10 MG tablet Take 1 tablet (10 mg total) by mouth daily. 90 tablet 3     metFORMIN (GLUCOPHAGE-XR) 500 MG 24 hr tablet Take 1 tablet (500 mg total) by mouth 2 (two) times a day before meals. 180 tablet 1     nystatin (MYCOSTATIN) cream Perineum as needed 30 g 0     omeprazole (PRILOSEC) 20 MG capsule TAKE 1 CAPSULE(20 MG) BY MOUTH DAILY BEFORE BREAKFAST 90 capsule 1     PARoxetine (PAXIL) 20 MG tablet Take 1 tablet (20 mg total) by mouth daily. 90 tablet 0     doxycycline (VIBRA-TABS) 100 MG tablet Take 1 tablet (100 mg total) by mouth 2 (two) times a day for 10 days. 20 tablet 0     No current facility-administered medications for this visit.        I spent 25 minutes with patient face to face, of which >50% was counseling regarding the above plan       Carlos Del Valle (Rob), CNP    2/5/2019

## 2021-06-23 NOTE — PROGRESS NOTES
Clinic Note    Assessment:     Assessment and Plan:  1. Type 2 diabetes mellitus (H)  She has been doing well on her extended release metformin; she has titrated herself up to thousand milligrams daily.  Sugars have responded well to this.  I am going to send in a prescription for the new dose and have her follow-up with me in 3 months for diabetes check.  - Comprehensive Metabolic Panel  - Glycosylated Hemoglobin A1c  - metFORMIN (GLUCOPHAGE-XR) 500 MG 24 hr tablet; Take 1 tablet (500 mg total) by mouth 2 (two) times a day before meals.  Dispense: 180 tablet; Refill: 1       Patient Instructions   Continue taking your metformin 500 mg twice daily with meals.  I sent in a new prescription for you today.    I sent in a refill of your clobetasol solution as well.    We will recheck your hemoglobin A1c today.    Call your insurance company and inquire about the coverage for the new shingles vaccine.  Call and ask them about coverage for the tetanus vaccination.    Recall [phone] to schedule your DEXA scan.  I would talk to Nadia Atkins about any specific questions you may have pertaining to the scan.    Follow-up with either I or Dr. Sheri Avila in 3 months for recheck of your diabetes.    Return in about 3 months (around 4/14/2019).         Subjective:      Patient comes to clinic today for her diabetes mellitus.    She originally saw me in August for some diarrhea/loose stools.  At that time, we decided we would cut back on her metformin to see if this would help her symptoms.    She saw her PCP, Dr. Sheri Avila 1 week later.  Her loose stools had improved but her diabetes had worsened; it was decided that they would reintroduce metformin as an extended release 500 mg once daily.    Patient titrated up to thousand milligrams daily on her own and tolerated this well.  However, her prescription was for 1 tablet daily instead of 2 and so she ran out of medication and requires a new prescription.    Her last  hemoglobin A1c was 7.4.  She checks her blood sugars while fasting every morning-these are usually less than 120.    The following portions of the patient's history were reviewed and updated as appropriate: Allergies, medications, problem list, prior note.     Review of Systems:    Review is otherwise negative except for what is mentioned above.     Social Hx:    Social History     Tobacco Use   Smoking Status Never Smoker   Smokeless Tobacco Never Used         Objective:     Vitals:    01/14/19 1547   BP: 100/50   Pulse: 74   Weight: 135 lb 9.6 oz (61.5 kg)       Exam:    General: No apparent distress. Calm. Alert and Oriented X3. Pt behavior is appropriate.      Patient Active Problem List   Diagnosis     Eczema     Osteoporosis     Anemia     Type 2 diabetes mellitus (H)     Esophageal reflux     Anxiety     Hypertension     Mixed hyperlipidemia     Lumbar radiculopathy     IgA monoclonal gammopathy of uncertain significance     Malignant neoplasm of left breast (H)     Hallux abducto valgus, left     Personal history of breast cancer     Bilateral sensorineural hearing loss     Gastroesophageal reflux disease     HTN (hypertension)     Hyperlipidemia     Osteopenia     Diabetes mellitus (H)     Current Outpatient Medications   Medication Sig Dispense Refill     ammonium lactate (AMLACTIN) 12 % cream APPLY PRN TO HANDS D  10     aspirin 81 mg chewable tablet Chew 81 mg daily.       atorvastatin (LIPITOR) 40 MG tablet TAKE 1 TABLET(40 MG) BY MOUTH AT BEDTIME 90 tablet 2     CALCITRATE 200 mg (950 mg) tablet TAKE 1 TABLET BY MOUTH TWICE DAILY (Patient taking differently: TAKE 1 TABLET BY MOUTH ONCE DAILY) 200 tablet 3     cholecalciferol, vitamin D3, (VITAMIN D3) 2,000 unit cap Take 1 capsule by mouth daily.       clobetasol (TEMOVATE) 0.05 % cream Apply 1 application topically 2 (two) times a day as needed. Apply daily as needed for eczema       docusate sodium (COLACE) 100 MG capsule Take 100 mg by mouth daily.         ferrous gluconate (FERGON) 324 MG tablet TAKE 1 TABLET(324 MG) BY MOUTH DAILY WITH BREAKFAST 90 tablet 0     fluocinonide (LIDEX) 0.05 % external solution Apply 1 application topically daily as needed. Apply to scalp       folic acid (FOLVITE) 1 MG tablet Take 400 mcg by mouth daily.        LACTOBACILLUS ACIDOPHILUS (PROBIOTIC ORAL) Take 1 tablet by mouth daily.        lancets (ONETOUCH DELICA LANCETS) 33 gauge Misc Test twice daily 200 each 2     lisinopril (PRINIVIL,ZESTRIL) 10 MG tablet Take 1 tablet (10 mg total) by mouth daily. 90 tablet 3     metFORMIN (GLUCOPHAGE-XR) 500 MG 24 hr tablet Take 1 tablet (500 mg total) by mouth 2 (two) times a day before meals. 180 tablet 1     nystatin (MYCOSTATIN) cream Perineum as needed 30 g 0     omeprazole (PRILOSEC) 20 MG capsule TAKE 1 CAPSULE(20 MG) BY MOUTH DAILY BEFORE BREAKFAST 90 capsule 1     PARoxetine (PAXIL) 20 MG tablet Take 1 tablet (20 mg total) by mouth daily. 90 tablet 0     clobetasol (TEMOVATE) 0.05 % external solution Apply to scalp as needed 50 mL 0     No current facility-administered medications for this visit.        I spent 25 minutes with patient face to face, of which >50% was counseling regarding the above plan       Carlos Del Valle (Rob), SPEEDY    1/14/2019

## 2021-06-23 NOTE — TELEPHONE ENCOUNTER
Please see separate encounter for same discussion RN Triaged. Patient is scheduled on MON to discuss this with provider.     Lucy Guy RN [facility] Triage

## 2021-06-23 NOTE — PATIENT INSTRUCTIONS - HE
I sent in a prescription for doxycycline to your pharmacy.  Take 1 tablet every 12 hours for the next 10 days.    Monitor your symptoms over the next week or so.  If you are not feeling better after 10 days, you should come back to see me.    Make sure to stay well-hydrated and drink plenty of water.  Tylenol for aches and pains

## 2021-06-23 NOTE — TELEPHONE ENCOUNTER
Refill Approved    Rx renewed per Medication Renewal Policy. Medication was last renewed on 12/20/17.    Deepali Zaragoza, [facility] Triage/Med Refill 1/14/2019     Requested Prescriptions   Pending Prescriptions Disp Refills     omeprazole (PRILOSEC) 20 MG capsule [Pharmacy Med Name: OMEPRAZOLE 20MG CAPSULES] 90 capsule 0     Sig: TAKE 1 CAPSULE(20 MG) BY MOUTH DAILY BEFORE BREAKFAST    GI Medications Refill Protocol Passed - 1/14/2019  3:23 AM       Passed - PCP or prescribing provider visit in last 12 or next 3 months.    Last office visit with prescriber/PCP: 8/15/2018 Sheri Avila MD OR same dept: 8/15/2018 Sheri Avila MD OR same specialty: 8/15/2018 Sheri Avila MD  Last physical: 11/30/2017 Last MTM visit: Visit date not found   Next visit within 3 mo: Visit date not found  Next physical within 3 mo: Visit date not found  Prescriber OR PCP: Sheri Avila MD  Last diagnosis associated with med order: 1. Esophageal reflux  - omeprazole (PRILOSEC) 20 MG capsule [Pharmacy Med Name: OMEPRAZOLE 20MG CAPSULES]; TAKE 1 CAPSULE(20 MG) BY MOUTH DAILY BEFORE BREAKFAST  Dispense: 90 capsule; Refill: 0    If protocol passes may refill for 12 months if within 3 months of last provider visit (or a total of 15 months).

## 2021-06-23 NOTE — TELEPHONE ENCOUNTER
Refill Approved    Rx renewed per Medication Renewal Policy. Medication was last renewed on 8/15/18.    Deepali Zaragoza, [facility] Triage/Med Refill 1/10/2019     Requested Prescriptions   Pending Prescriptions Disp Refills     metFORMIN (GLUCOPHAGE-XR) 500 MG 24 hr tablet [Pharmacy Med Name: METFORMIN ER 500MG 24HR TABS] 90 tablet 0     Sig: TAKE 1 TABLET(500 MG) BY MOUTH DAILY WITH SUPPER    Metformin Refill Protocol Passed - 1/9/2019 12:29 PM       Passed - Blood pressure in last 12 months    BP Readings from Last 1 Encounters:   12/07/18 112/64            Passed - LFT or AST or ALT in last 12 months    Albumin   Date Value Ref Range Status   08/15/2018 3.8 3.5 - 5.0 g/dL Final     Bilirubin, Total   Date Value Ref Range Status   08/15/2018 0.6 0.0 - 1.0 mg/dL Final     Bilirubin, Direct   Date Value Ref Range Status   07/21/2015 0.3 <=0.5 mg/dL Final     Alkaline Phosphatase   Date Value Ref Range Status   08/15/2018 86 45 - 120 U/L Final     AST   Date Value Ref Range Status   08/15/2018 16 0 - 40 U/L Final     ALT   Date Value Ref Range Status   08/15/2018 23 0 - 45 U/L Final     Protein, Total   Date Value Ref Range Status   08/15/2018 7.2 6.0 - 8.0 g/dL Final               Passed - GFR or Serum Creatinine in last 6 months    GFR MDRD Non Af Amer   Date Value Ref Range Status   08/15/2018 49 (L) >60 mL/min/1.73m2 Final     GFR MDRD Af Amer   Date Value Ref Range Status   08/15/2018 60 (L) >60 mL/min/1.73m2 Final            Passed - Visit with PCP or prescribing provider visit in last 6 months or next 3 months    Last office visit with prescriber/PCP: 8/15/2018 OR same dept: 8/15/2018 Sheri Avila MD OR same specialty: 8/15/2018 Sheri Avila MD Last physical: Visit date not found Last MTM visit: Visit date not found         Next appt within 3 mo: Visit date not found  Next physical within 3 mo: Visit date not found  Prescriber OR PCP: Sheri Avila MD  Last diagnosis associated with med order:  1. Type 2 diabetes mellitus (H)  - metFORMIN (GLUCOPHAGE-XR) 500 MG 24 hr tablet [Pharmacy Med Name: METFORMIN ER 500MG 24HR TABS]; TAKE 1 TABLET(500 MG) BY MOUTH DAILY WITH SUPPER  Dispense: 90 tablet; Refill: 0     If protocol passes may refill for 12 months if within 3 months of last provider visit (or a total of 15 months).          Passed - A1C in last 6 months    Hemoglobin A1c   Date Value Ref Range Status   08/15/2018 7.4 (H) 3.5 - 6.0 % Final              Passed - Microalbumin in last year     Microalbumin, Random Urine   Date Value Ref Range Status   03/12/2018 6.20 (H) 0.00 - 1.99 mg/dL Final

## 2021-06-24 NOTE — TELEPHONE ENCOUNTER
Patient notified of clinician's message and verbalized understanding. No further questions at this time.   Nasrin Freeman CMA ............... 5:15 PM, 02/14/19

## 2021-06-24 NOTE — PROGRESS NOTES
HISTORY OF PRESENT ILLNESS  Asked to see by Dr. Avila for sinus problems. Post nasal drip, fatigue and lots of drainage. Her symptoms have improved. The first antibiotics didn't work. The second one gave her watery diarrhea. The third antibiotic gave her a stomach ache. Top of her head feels pressure. Her eyes are watery. She had a CT scan after the second antibiotics. She has been using fluticasone for about 3 weeks. She has also been using Neomed irrigations. She has also been using otc decongestants.     REVIEW OF SYSTEMS  Review of Systems: a 10-system review was performed. Pertinent positives are noted in the HPI and on a separate scanned document in the chart.    PMH, PSH, FH and SH has documented in the EHR.      EXAM    CONSTITUTIONAL  General Appearance:  Normal, well developed, well nourished, no obvious distress  Ability to Communicate:  communicates appropriately.    HEAD AND FACE  Appearance and Symmetry:  Normal, no scalp or facial scarring or suspicious lesions.  Paranasal sinuses tenderness:  Normal, Paranasal sinuses non tender    EARS  Clinical speech reception threshold:  Normal, able to hear normal speech.  Auricle:  Normal, Auricles without scars, lesions, masses.  External auditory canal:  Normal, External auditory canal normal.  Tympanic membrane:  Normal, Tympanic membranes normal without swelling or erythema.  Tympanic membrane mobility:  Normal, Normal tympanic membrane mobility.    NOSE (speculum or scope)  Architecture:  Normal, Grossly normal external nasal architecture with no masses or lesions.  Mucosa:  Normal mucosa, No polyps or masses.  Septum:  Normal, Septum non-obstructing.  Turbinates:  Normal, No turbinate abnormalities    ORAL CAVITY AND OROPHARYNX  Lips:  Normal.  Dental and gingiva:  Normal, No obvious dental or gingival disease.  Mucosa:  Normal, Moist mucous membranes.  Tongue:  Normal, Tongue mobile with no mucosal abnormalities  Hard and soft palate:  Normal, Hard and  soft palate without cleft or mucosal lesions.  Oral pharynx:  Normal, Posterior pharynx without lesions or remarkable asymmetry.  Saliva:  Normal, Clear saliva.  Masses:  Normal, No palpable masses or pathologically enlarged lymph nodes.    NECK  Masses/lymph nodes:  Normal, No worrisome neck masses or lymph nodes.  Salivary glands:  Normal, Parotid and submandibular glands.  Trachea and larynx position:  Normal, Trachea and larynx midline.  Thyroid:  Normal, No thyroid abnormality.  Tenderness:  Normal, No cervical tenderness.  Suppleness:  Normal, Neck supple    NEUROLOGICAL  Speech pattern:  Normal, Proasaic    RESPIRATORY  Symmetry and Respiratory effort:  Normal, Symmetric chest movement and expansion with no increased intercostal retractions or use of accessory muscles.     CT SINUS  Images and report were reviewed. No evidence of significant sinusitis based on CT sinus. I provided reassurance.     IMPRESSION  It seemed that the patient's main issue was congestion and drainage.     RECOMMENDATION  She admits to haven taken her 's flonase and seems to help. I filled a script for her.      Dragan Varela MD

## 2021-06-24 NOTE — TELEPHONE ENCOUNTER
Patient notified of clinician's message and verbalized understanding. No further questions at this time.   Nasrin Freeman CMA ............... 4:08 PM, 02/13/19

## 2021-06-24 NOTE — TELEPHONE ENCOUNTER
Test Results  Who is calling?:  Patient  Who ordered the test:  Sheri Avila MD  Type of test: Imaging  Date of test:  2/13/2019  Where was the test performed:  [facility]  What are your questions/concerns?:  Patient is wanting to know results of her CT scan.     IMPRESSION:   CONCLUSION:  1.  Mild mucosal thickening at the floor of the bilateral maxillary sinuses. Paranasal sinuses are otherwise clear.      Writer did not relay any information.     Okay to leave a detailed message?:  Yes

## 2021-06-24 NOTE — PATIENT INSTRUCTIONS - HE
Prescription for azithromycin was sent into your pharmacy.  Take 2 tablets on day 1, followed by 1 tablet on days 2 through 5.    You have been sick for a while now.  Just to be safe, let us have you see an ear nose and throat doctor if your symptoms persist.  Please call [facility] at [phone] to schedule an appointment.    Increase your [place] pot saline rinses to twice daily, once in the morning and once in the evening.  Do your Flonase nasal spray immediately following each [place] pot use.    DayQuil during the day, NyQuil at night.

## 2021-06-24 NOTE — TELEPHONE ENCOUNTER
Medication Question or Clarification  Who is calling: Patient  What medication are you calling about?: amoxicillin-clavulanate (AUGMENTIN) 875-125 mg per tablet  What dose do you take?: 875-125 mg  How often are you taking the medication?: Take 1 tablet by mouth 2 (two) times a day for 10 days.  Who prescribed the medication?: Dr. Wyatt Ordonez  What is your question/concern?: Patient stated she went into [facility] on 2/11 and was given Augmentin. Patient stated she is still not feeling better and has developed diarrhea from the Augmentin. Patient stated Imodium helps the diarrhea a little but she still has it. Patient was also told by Dr. Ordonez, to take 2 tabs of her probiotic. Patient would like to know what she should do. Please call patient back today.  Pharmacy: [facility] to leave a detailed message?: Yes  [phone]  Site CMT - Please call the pharmacy to obtain any additional needed information.

## 2021-06-24 NOTE — TELEPHONE ENCOUNTER
The scan shows only mildl mucosal thickening in the base of both maxillary sinuses.  There is no fluid impaction and the rest of the sinuses are clear.  I don't think her sx are coming from her sinuses.  I think she has a bad respiratory virus.  If her sx get worse, then she should be re-evaluated.

## 2021-06-24 NOTE — TELEPHONE ENCOUNTER
Spoke with patient. States she is have 1 watery bowel movement per hour since she started on the Augmentin on 2/11/19. Today she took Immodium (1 tablet) in the AM and states it is better but still having watery stools. She was told by Dr. Ordonez that diarrhea was common for the Augmentin. He told her to take 2 probiotics with it. She states she was on doxycycline and switched because it wasn't working for her sinus infection. She still feels achy and doesn't feel any better. No fever but has diarrhea every time she coughs or walks around. Please advise.

## 2021-06-24 NOTE — TELEPHONE ENCOUNTER
Stop the antibiotics; both are good meds and neither one is helping.    Take probiotics and eat a bland diet.  If the diarrhea does not improve or worsens, then she'll need to be seen for possible c dif (an infection that can occur after antibiotic use).    I also want her to get a CT scan of her sinuses:  [phone].  If that shows a sinus infection, then we can determine the next step in treatment.

## 2021-06-24 NOTE — PROGRESS NOTES
Clinic Note    Assessment:     Assessment and Plan:  1. Maxillary sinusitis, unspecified chronicity  She has had persistent sinusitis is she did not respond to doxycycline or Augmentin.  I will send in azithromycin for now and place an order for ENT referral.  See patient instructions below.  - Ambulatory referral to ENT       Patient Instructions   Prescription for azithromycin was sent into your pharmacy.  Take 2 tablets on day 1, followed by 1 tablet on days 2 through 5.    You have been sick for a while now.  Just to be safe, let us have you see an ear nose and throat doctor if your symptoms persist.  Please call [facility] at [phone] to schedule an appointment.    Increase your Lane pot saline rinses to twice daily, once in the morning and once in the evening.  Do your Flonase nasal spray immediately following each Lane pot use.    DayQuil during the day, NyQuil at night.    Return for If symptoms do not start to improve in two weeks..         Subjective:      Patient comes to clinic today for follow-up of her sinusitis.    Patient was originally seen by me 2 weeks ago.  At that time, a prescription for doxycycline was sent in.  She followed up at the [facility] for persistent symptoms.  She was started on Augmentin.  She unfortunately developed some fairly significant diarrhea as a result of the Augmentin.  She was told by her PCP to stop the antibiotic and get a CT scan of her sinuses.    CT scan shows mild mucosal thickening at the floor of the bilateral maxillary sinuses.  Paranasal sinuses are otherwise clear.    Today, she says she continues to feel fullness in her face.  Pressure behind her eyes.  Headaches.  Chills but no fevers.  Using Shani pot once daily.  Flonase twice daily.  DayQuil during the day, NyQuil at night.    Diarrhea has stopped since discontinuing the Augmentin.  Eating and drinking okay.  He has never seen an ENT doctor before.  She is never had persistent issues with sinusitis  before.    The following portions of the patient's history were reviewed and updated as appropriate: Allergies, medications, problems, prior note.    Review of Systems:    Review is otherwise negative except for what is mentioned above.     Social Hx:    Social History     Tobacco Use   Smoking Status Never Smoker   Smokeless Tobacco Never Used         Objective:     Vitals:    02/18/19 1033   BP: 114/60   Pulse: 76   Weight: 132 lb (59.9 kg)       Exam:    General: No apparent distress. Calm. Alert and Oriented X3. Pt behavior is appropriate.  Head:Atraumatic. Normocephalic, tenderness to palpation to maxillary sinuses bilaterally  Neck: Supple. No JVD. Full ROM. No adenopathy  Eyes: PERRL, No discharge. No strabismus. No nystagmus.  Ears: TMs pearly gray with landmarks visible.   Nose/Mouth/Throat: Patent nares, no oral lesions, pharynx clear and without exudate. Uvula mid-line. Nasal septum mid-line. Clear turbinates.   Lymph: No axillar or cervical adenopathy.   Chest/Lungs: Normal chest wall, clear to auscultation, normal respiratory effort and rate.       Patient Active Problem List   Diagnosis     Eczema     Osteoporosis     Anemia     Type 2 diabetes mellitus (H)     Esophageal reflux     Anxiety     Hypertension     Mixed hyperlipidemia     Lumbar radiculopathy     IgA monoclonal gammopathy of uncertain significance     Malignant neoplasm of left breast (H)     Hallux abducto valgus, left     Personal history of breast cancer     Bilateral sensorineural hearing loss     Gastroesophageal reflux disease     HTN (hypertension)     Hyperlipidemia     Osteopenia     Diabetes mellitus (H)     Current Outpatient Medications   Medication Sig Dispense Refill     ammonium lactate (AMLACTIN) 12 % cream APPLY PRN TO HANDS D  10     aspirin 81 mg chewable tablet Chew 81 mg daily.       atorvastatin (LIPITOR) 40 MG tablet TAKE 1 TABLET(40 MG) BY MOUTH AT BEDTIME 90 tablet 2     CALCITRATE 200 mg (950 mg) tablet TAKE 1  TABLET BY MOUTH TWICE DAILY (Patient taking differently: TAKE 1 TABLET BY MOUTH ONCE DAILY) 200 tablet 3     cholecalciferol, vitamin D3, (VITAMIN D3) 2,000 unit cap Take 1 capsule by mouth daily.       clobetasol (TEMOVATE) 0.05 % cream Apply 1 application topically 2 (two) times a day as needed. Apply daily as needed for eczema       clobetasol (TEMOVATE) 0.05 % external solution Apply to scalp as needed 50 mL 0     docusate sodium (COLACE) 100 MG capsule Take 100 mg by mouth daily.        ferrous gluconate (FERGON) 324 MG tablet TAKE 1 TABLET(324 MG) BY MOUTH DAILY WITH BREAKFAST 90 tablet 0     fluocinonide (LIDEX) 0.05 % external solution Apply 1 application topically daily as needed. Apply to scalp       folic acid (FOLVITE) 1 MG tablet Take 400 mcg by mouth daily.        LACTOBACILLUS ACIDOPHILUS (PROBIOTIC ORAL) Take 1 tablet by mouth daily.        lancets (ONETOUCH DELICA LANCETS) 33 gauge Misc Test twice daily 200 each 2     lisinopril (PRINIVIL,ZESTRIL) 10 MG tablet Take 1 tablet (10 mg total) by mouth daily. 90 tablet 3     metFORMIN (GLUCOPHAGE-XR) 500 MG 24 hr tablet Take 1 tablet (500 mg total) by mouth 2 (two) times a day before meals. 180 tablet 1     nystatin (MYCOSTATIN) cream Perineum as needed 30 g 0     omeprazole (PRILOSEC) 20 MG capsule TAKE 1 CAPSULE(20 MG) BY MOUTH DAILY BEFORE BREAKFAST 90 capsule 1     PARoxetine (PAXIL) 20 MG tablet Take 1 tablet (20 mg total) by mouth daily. 90 tablet 0     amoxicillin-clavulanate (AUGMENTIN) 875-125 mg per tablet Take 1 tablet by mouth 2 (two) times a day for 10 days. 20 tablet 0     azithromycin (ZITHROMAX) 250 MG tablet Take 500 mg (2 x 250 mg tablets) on day 1 followed by 250 mg (1 tablet) on days 2-5. 6 tablet 0     No current facility-administered medications for this visit.        I spent 25 minutes with patient face to face, of which >50% was counseling regarding the above plan       Carlos Del Valle (Rob), SPEEDY    2/18/2019

## 2021-06-25 NOTE — TELEPHONE ENCOUNTER
Refill Approved    Rx renewed per Medication Renewal Policy. Medication was last renewed on 12/17/18.    Deepali Zaragoza, [facility] Triage/Med Refill 3/21/2019     Requested Prescriptions   Pending Prescriptions Disp Refills     PARoxetine (PAXIL) 20 MG tablet [Pharmacy Med Name: PAROXETINE 20MG TABLETS] 90 tablet 0     Sig: TAKE 1 TABLET(20 MG) BY MOUTH DAILY    SSRI Refill Protocol  Passed - 3/19/2019  3:23 AM       Passed - PCP or prescribing provider visit in last year    Last office visit with prescriber/PCP: 8/15/2018 Sheri Avila MD OR same dept: 2/18/2019 Carlos Del Valle CNP OR same specialty: 2/18/2019 Carlos Del Valle CNP  Last physical: 11/30/2017 Last MTM visit: Visit date not found   Next visit within 3 mo: Visit date not found  Next physical within 3 mo: Visit date not found  Prescriber OR PCP: Sheri Avila MD  Last diagnosis associated with med order: 1. Anxiety state  - PARoxetine (PAXIL) 20 MG tablet [Pharmacy Med Name: PAROXETINE 20MG TABLETS]; TAKE 1 TABLET(20 MG) BY MOUTH DAILY  Dispense: 90 tablet; Refill: 0    If protocol passes may refill for 12 months if within 3 months of last provider visit (or a total of 15 months).

## 2021-06-25 NOTE — TELEPHONE ENCOUNTER
RN cannot approve Refill Request    RN can NOT refill this medication   Patient request early refill. Medication last filled 11/23/20 for qty 90 refill 2. Provider to advise on request. . Last office visit: 4/30/2021 Chuy Avila MD Last Physical: 1/23/2020 Last MTM visit: Visit date not found Last visit same specialty: 4/30/2021 Chuy Avila MD.  Next visit within 3 mo: Visit date not found  Next physical within 3 mo: Visit date not found      Abraham Gilman, [facility] Triage/Med Refill 6/15/2021    Requested Prescriptions   Pending Prescriptions Disp Refills     PARoxetine (PAXIL) 20 MG tablet [Pharmacy Med Name: PAROXETINE 20MG TABLETS] 90 tablet 2     Sig: TAKE 1 TABLET(20 MG) BY MOUTH DAILY       SSRI Refill Protocol  Passed - 6/15/2021  5:57 AM        Passed - PCP or prescribing provider visit in last year     Last office visit with prescriber/PCP: 4/30/2021 Chuy Avila MD OR same dept: 4/30/2021 Chuy Avila MD OR same specialty: 4/30/2021 Chuy Avila MD  Last physical: 1/23/2020 Last MTM visit: Visit date not found   Next visit within 3 mo: Visit date not found  Next physical within 3 mo: Visit date not found  Prescriber OR PCP: Chuy Avila MD  Last diagnosis associated with med order: 1. Anxiety state  - PARoxetine (PAXIL) 20 MG tablet [Pharmacy Med Name: PAROXETINE 20MG TABLETS]; TAKE 1 TABLET(20 MG) BY MOUTH DAILY  Dispense: 90 tablet; Refill: 2    If protocol passes may refill for 12 months if within 3 months of last provider visit (or a total of 15 months).

## 2021-06-28 ENCOUNTER — OFFICE VISIT - HEALTHEAST (OUTPATIENT)
Dept: INTERNAL MEDICINE | Facility: CLINIC | Age: 86
End: 2021-06-28

## 2021-06-28 DIAGNOSIS — I10 ESSENTIAL HYPERTENSION: ICD-10-CM

## 2021-06-28 DIAGNOSIS — E11.9 TYPE 2 DIABETES MELLITUS WITHOUT COMPLICATION, WITHOUT LONG-TERM CURRENT USE OF INSULIN (H): ICD-10-CM

## 2021-06-28 DIAGNOSIS — M75.101 ROTATOR CUFF SYNDROME, RIGHT: ICD-10-CM

## 2021-06-28 ASSESSMENT — MIFFLIN-ST. JEOR: SCORE: 1034.76

## 2021-07-06 VITALS
OXYGEN SATURATION: 97 % | BODY MASS INDEX: 28.98 KG/M2 | SYSTOLIC BLOOD PRESSURE: 106 MMHG | WEIGHT: 147.6 LBS | HEART RATE: 84 BPM | DIASTOLIC BLOOD PRESSURE: 66 MMHG | HEIGHT: 60 IN

## 2021-07-07 NOTE — PROGRESS NOTES
"  Assessment & Plan   Problem List Items Addressed This Visit     Type 2 diabetes mellitus (H) - Primary    Hypertension      Other Visit Diagnoses     Rotator cuff syndrome, right        Relevant Orders    Ambulatory referral to Adult PT- Internal         #1.  Rotator cuff tendinopathy on the right.  I will refer her to physical therapy.  We discussed the possibility of a corticosteroid injection and she is going to defer this at this time.  If she is not improved in 4 to 6 weeks we should see her back for consideration of injection.    #2.  Type 2 diabetes, well controlled and stable.    3.  Hypertension, stable.          No follow-ups on file.    Chuy Avila MD  [facility]      Subjective   Silvia Avila is 86 y.o. and presents today for the following health issues     Silvia comes into the office today for evaluation of right shoulder pain.  She states that this is been going on for about the last 2 to 3 weeks.  She localizes the pain to the lateral aspect of the shoulder and states that it gets worse when she reaches overhead and when she lies down on the right side.  Apparently she saw her chiropractor for this who told her that it was consistent with rotator cuff tendinitis and she should see a physical therapist.  She is right-handed dominant.  No history of shoulder problems in the past.  There was no injury that precipitated the pain.      Objective    /66 (Patient Site: Right Arm, Patient Position: Sitting, Cuff Size: Adult Regular)   Pulse 84   Ht 4' 11.61\" (1.514 m)   Wt 147 lb 9.6 oz (67 kg)   SpO2 97%   BMI 29.21 kg/m    Body mass index is 29.21 kg/m .  Physical Exam    Right shoulder: Range of motion is full to flexion and abduction.  Strength is 5/5 to resisted shoulder abduction, external rotation, internal rotation, and supraspinatus testing.  Supraspinatus testing significantly reproduces her pain.  Jeffery's is very positive, Morenita is very " positive.

## 2021-07-08 ENCOUNTER — TRANSCRIBE ORDERS (OUTPATIENT)
Dept: ENDOCRINOLOGY | Facility: CLINIC | Age: 86
End: 2021-07-08

## 2021-07-08 DIAGNOSIS — Z92.29 PERSONAL HISTORY OF OTHER DRUG THERAPY: ICD-10-CM

## 2021-07-08 DIAGNOSIS — M81.0 OSTEOPOROSIS WITHOUT CURRENT PATHOLOGICAL FRACTURE, UNSPECIFIED OSTEOPOROSIS TYPE: Primary | ICD-10-CM

## 2021-07-08 NOTE — PROGRESS NOTES
As part of the required manual data conversion process for integration, this encounter was created to document a CAM (Clinic Administered Medication) order. This information was copied from the [facility] patient's chart to the [facility] patient chart.     Larry Hernandez PharmD  July 8, 2021

## 2021-07-22 ENCOUNTER — HOSPITAL ENCOUNTER (OUTPATIENT)
Dept: PHYSICAL THERAPY | Facility: REHABILITATION | Age: 86
End: 2021-07-22
Payer: COMMERCIAL

## 2021-07-22 DIAGNOSIS — M75.41 IMPINGEMENT SYNDROME OF SHOULDER REGION, RIGHT: ICD-10-CM

## 2021-07-22 DIAGNOSIS — M77.8 RIGHT SHOULDER TENDINITIS: Primary | ICD-10-CM

## 2021-07-22 PROCEDURE — 97140 MANUAL THERAPY 1/> REGIONS: CPT | Mod: GP | Performed by: PHYSICAL THERAPIST

## 2021-07-22 PROCEDURE — 97161 PT EVAL LOW COMPLEX 20 MIN: CPT | Mod: GP | Performed by: PHYSICAL THERAPIST

## 2021-07-22 PROCEDURE — 97110 THERAPEUTIC EXERCISES: CPT | Mod: GP | Performed by: PHYSICAL THERAPIST

## 2021-07-22 NOTE — PROGRESS NOTES
[facility]          OUTPATIENT PHYSICAL THERAPY ORTHOPEDIC EVALUATION  PLAN OF TREATMENT FOR OUTPATIENT REHABILITATION  (COMPLETE FOR INITIAL CLAIMS ONLY)  Patient's Last Name, First Name, M.I.  YOB: 1935  Silvia Avila    Provider s Name:  [facility]   Medical Record No.  [id number]   Start of Care Date:  07/22/21   Onset Date:  05/01/21   Type:     _X__PT   ___OT   ___SLP Medical Diagnosis:  (P) right shoulder tendinitis     PT Diagnosis:  right shoulder tendinitis   Visits from SOC:  1      _________________________________________________________________________________  Plan of Treatment/Functional Goals:  joint mobilization, manual therapy, neuromuscular re-education, ROM, strengthening, stretching     Ultrasound     Goals  Goal Identifier: sleep  Goal Description: able to sleep through the night without waking due to pain and able to lay on right side  Target Date: 10/19/21    Goal Identifier: reaching  Goal Description: able to reach up into cupboards and get a plate as AROM improves as well as empty   Target Date: 10/19/21                                                                      Therapy Frequency:  2 times/Week  Predicted Duration of Therapy Intervention:  12    Lo Cunningham PT                 I CERTIFY THE NEED FOR THESE SERVICES FURNISHED UNDER        THIS PLAN OF TREATMENT AND WHILE UNDER MY CARE     (Physician co-signature of this document indicates review and certification of the therapy plan).                       Certification Date From:  10/19/21   Certification Date To:  (P) 10/19/21    Referring Provider:  Chuy Avila MD    Initial Assessment        See Epic Evaluation Start of Care Date: 07/22/21 07/22/21 1004   General Information   Type of Visit Initial OP Ortho PT Evaluation   Start of Care Date  07/22/21   Referring Physician Chuy Avila MD   Orders Evaluate and Treat   Certification Required? Yes   Medical Diagnosis Right Shoulder Tendinitis   Surgical/Medical history reviewed Yes   Precautions/Limitations no known precautions/limitations   Body Part(s)   Body Part(s) Shoulder   Presentation and Etiology   Pertinent history of current problem (include personal factors and/or comorbidities that impact the POC) no history of prior shoulder issues   Impairments A. Pain;D. Decreased ROM;E. Decreased flexibility;F. Decreased strength and endurance   Functional Limitations perform activities of daily living   Symptom Location throughout right shoulder   How/Where did it occur From insidious onset   Onset date of current episode/exacerbation 05/01/21   Pain rating (0-10 point scale) Best (/10);Worst (/10)   Best (/10) 6/10   Worst (/10) 9/10   Pain quality B. Dull;C. Aching   Pain/symptoms are: The same all the time   Pain/symptoms exacerbated by C. Lifting;M. Other   Pain exacerbation comment sleep   Pain/symptoms eased by C. Rest;K. Other   Pain eased by comment Advil   Progression of symptoms since onset: Unchanged   Prior Level of Function   Prior Level of Function-Mobility prior to pain, could do anything she wanted with shoulder   Current Level of Function   Living environment Apartment/condo   Fall Risk Screen   Fall screen completed by PT   Have you fallen 2 or more times in the past year? Yes   Have you fallen and had an injury in the past year? No   Is patient a fall risk? No   Fall screen comments pt was walking dog with a leash when she fell.  She feels her balance is good   Abuse Screen (yes response referral indicated)   Feels Unsafe at Home or Work/School no   Feels Threatened by Someone no   Does Anyone Try to Keep You From Having Contact with Others or Doing Things Outside Your Home? no   Physical Signs of Abuse Present no   Functional Scales   Functional Scales Other   Other Scales  SPADI    Shoulder Objective Findings   Side (if bilateral, select both right and left) Right   Observation left shoulder lower, moderate thoracic kyphosis   Shoulder ROM Comment left shoulder AROM WNL throughout   Scapulothoracic Rhythm no   Xavier-Gilbert Test positive on right   Crossover Test neg   Palpation R pec major, supraspinatus, infraspinatus, teres minor and major, deltoids, RC insertion   Right Shoulder Flexion AROM 80 pain limited   Right Shoulder Flexion PROM 170 degrees, stopped by pain   Right Shoulder Abduction AROM 80, pain limited   Right Shoulder Abduction PROM 165, stopped by pain   Right Shoulder ER AROM WNL, pain at end of range (at 70 degress abduction)   Right Shoulder IR AROM WNL, pain at end of range   Right Shoulder Flexion Strength NT due to pain   Right Shoulder Abduction Strength NT due to pain   Right Shoulder ER Strength NT due to pain   Right Shoulder IR Strength NT due to pain   Planned Therapy Interventions   Planned Therapy Interventions joint mobilization;manual therapy;neuromuscular re-education;ROM;strengthening;stretching   Planned Modality Interventions   Planned Modality Interventions Ultrasound   Clinical Impression   Criteria for Skilled Therapeutic Interventions Met yes, treatment indicated   PT Diagnosis right shoulder tendinitis   Influenced by the following impairments pain, decrease AROM and strength   Functional limitations due to impairments decrease sleep, unable to reach up, lifting objects   Clinical Presentation Stable/Uncomplicated   Clinical Decision Making (Complexity) Low complexity   Therapy Frequency 2 times/Week   Predicted Duration of Therapy Intervention (days/wks) 12   Risk & Benefits of therapy have been explained Yes   ORTHO GOALS   PT Ortho Eval Goals 1;2   Ortho Goal 1   Goal Identifier sleep   Goal Description able to sleep through the night without waking due to pain and able to lay on right side   Target Date 10/19/21   Ortho Goal 2   Goal Identifier  reaching   Goal Description able to reach up into cupboards and get a plate as AROM improves as well as empty    Target Date 10/19/21   Total Evaluation Time   PT Eval, Low Complexity Minutes (78075) 20   Therapy Certification   Certification date from 10/19/21   Certification date to 10/19/21   Medical Diagnosis right shoulder tendinitis

## 2021-07-26 ENCOUNTER — HOSPITAL ENCOUNTER (OUTPATIENT)
Dept: PHYSICAL THERAPY | Facility: REHABILITATION | Age: 86
End: 2021-07-26
Payer: COMMERCIAL

## 2021-07-26 DIAGNOSIS — M77.8 RIGHT SHOULDER TENDINITIS: Primary | ICD-10-CM

## 2021-07-26 DIAGNOSIS — M75.41 IMPINGEMENT SYNDROME OF SHOULDER REGION, RIGHT: ICD-10-CM

## 2021-07-26 PROCEDURE — 97110 THERAPEUTIC EXERCISES: CPT | Mod: GP | Performed by: PHYSICAL THERAPIST

## 2021-07-26 PROCEDURE — 97140 MANUAL THERAPY 1/> REGIONS: CPT | Mod: GP | Performed by: PHYSICAL THERAPIST

## 2021-07-26 NOTE — PROGRESS NOTES
[facility] Rehabilitation Daily Progress     Patient Name: Silvia Avila  : 1935  Date of visit: 2021  Visit #:   POC Dates: 21-10/19/21  Referral Diagnosis: Right Shoulder pain  Referring provider: Chuy Avila MD  Visit Diagnosis:       Assessment:     Pt returns for her first follow-up.  She doesn't feel her pain levels have change much.  She did not feel she needed to review the stretches but did discuss them.  She did use the pulleys in the clinic but doesn't feel she needs one for home.   Manual therpyp felt good again today and did help loosen the sore muscles, but much pain remains.  May add an ultrasound as well for pain control  Patient is appropriate to continue with skilled physical therapy intervention, as indicated by initial plan of care.      Goals:  See note    Plan / Patient Education:   Plan:  Stretches-review prn  Manual therapy  ? ultrasound    Subjective:     Pain Ratin  Client Self Report:    My shoulder feels about the same  The massage felt good and helped with pain relief 2-3 hours  I have been doing my stretches, don't think I need to review them      Objective:     Patient Active Problem List   Diagnosis     Anemia     Type 2 diabetes mellitus (H)     Anxiety     Hypertension     Mixed hyperlipidemia     IgA monoclonal gammopathy of uncertain significance     Personal history of breast cancer     Bilateral sensorineural hearing loss     Gastroesophageal reflux disease     Osteopenia         Treatment Today         Date 21   Exercise    HEP:  Supine cane flex, standing cane abd, wall walk    Pulley's for shoulder flex and abd Hold 3-5 seconds x 4                                                 TREATMENT MINUTES COMMENTS   Evaluation     Self-care/ Home management     Manual therapy 15 Pt sitting in chair with pillow under arm:  Manual therapy right UT, LS, supraspinatus, infraspinatus, deltoids,    Neuromuscular Re-education     Therapeutic  Activity     Therapeutic Exercises 10 See flow sheets above     Gait training     Modality__________________                Total 25    Blank areas are intentional and mean the treatment did not include these items.       Lo Cunningham, PT  7/26/2021

## 2021-07-30 ENCOUNTER — HOSPITAL ENCOUNTER (OUTPATIENT)
Dept: PHYSICAL THERAPY | Facility: REHABILITATION | Age: 86
End: 2021-07-30
Payer: COMMERCIAL

## 2021-07-30 DIAGNOSIS — M75.41 IMPINGEMENT SYNDROME OF SHOULDER REGION, RIGHT: ICD-10-CM

## 2021-07-30 DIAGNOSIS — M77.8 RIGHT SHOULDER TENDINITIS: Primary | ICD-10-CM

## 2021-07-30 PROCEDURE — 97140 MANUAL THERAPY 1/> REGIONS: CPT | Mod: GP | Performed by: PHYSICAL THERAPIST

## 2021-07-30 PROCEDURE — 97110 THERAPEUTIC EXERCISES: CPT | Mod: GP | Performed by: PHYSICAL THERAPIST

## 2021-07-30 NOTE — PROGRESS NOTES
[facility] Rehabilitation Daily Progress     Patient Name: Silvia Avila  : 1935  Date of visit: 2021  Visit #: 3/12  POC Dates: 21-10/19/21  Referral Diagnosis: Right Shoulder pain  Referring provider: Chuy Avila MD  Visit Diagnosis: Right shoulder pain       Assessment:     Pt returns for a follow-up and feels the pain levels might be slightly less.  She has a few hours relief after physical therapy sessions then the pain returns.    Did not add ultrasound today hoping to get more pain relief with just manual therapy today. May still add over the next visit or two.    Patient is appropriate to continue with skilled physical therapy intervention, as indicated by initial plan of care.      Goals:  See note    Plan / Patient Education:   Plan:    measurements  Pulleys  Isometrics for shoulder-painfree  Manual therapy  ? ultrasound    Subjective:     Pain Ratin when using it                       0 at rest  Client Self Report:    My shoulder doesn't ache as much as it was.    Still painful reaching up into cupboard      Objective:     Patient Active Problem List   Diagnosis     Anemia     Type 2 diabetes mellitus (H)     Anxiety     Hypertension     Mixed hyperlipidemia     IgA monoclonal gammopathy of uncertain significance     Personal history of breast cancer     Bilateral sensorineural hearing loss     Gastroesophageal reflux disease     Osteopenia       AROM Measurements in degrees:   Date:                              21  R Shoulder flexion:        80 and pain     R Shoulder abduction:  80 and pain    PROM Measurements in degrees:  Date:                               21  R Shoulder flex:              170-pain  R Shoulder abd:              165-pain    Treatment Today         Date 21   Exercise     HEP:  Supine cane flex, standing cane abd, wall walk     Pulley's for shoulder flex and abd Hold 3-5 seconds x 4 Hold 3-5 seconds x 4   UBE 3'                                                        TREATMENT MINUTES COMMENTS   Evaluation     Self-care/ Home management     Manual therapy 15 Pt sitting in chair with pillow under arm:  Manual therapy right infraspinatus, deltoids,    Neuromuscular Re-education     Therapeutic Activity     Therapeutic Exercises 15 See flow sheets above     Gait training     Modality__________________                Total 30 Pt 10 min late but able to see her for full RX time today   Blank areas are intentional and mean the treatment did not include these items.       Lo Cunningham, PT  7/30/2021

## 2021-08-03 ENCOUNTER — ALLIED HEALTH/NURSE VISIT (OUTPATIENT)
Dept: ENDOCRINOLOGY | Facility: CLINIC | Age: 86
End: 2021-08-03
Payer: COMMERCIAL

## 2021-08-03 DIAGNOSIS — M81.0 OSTEOPOROSIS WITHOUT CURRENT PATHOLOGICAL FRACTURE, UNSPECIFIED OSTEOPOROSIS TYPE: Primary | ICD-10-CM

## 2021-08-03 PROCEDURE — 99207 PR NO CHARGE NURSE ONLY: CPT

## 2021-08-03 PROCEDURE — 96372 THER/PROPH/DIAG INJ SC/IM: CPT | Performed by: NURSE PRACTITIONER

## 2021-08-03 NOTE — PROGRESS NOTES
"Prolia Injection Phone Screen      Screening questions have been asked 2-3 days prior to administration visit for Prolia. If any questions are answered with \"Yes,\" this phone encounter were will routed to ordering provider for further evaluation.     1.  When was the last injection?  2/2/21    2.  Has insurance for this injection been verified?  Yes    3.  Did you experience any new onset achiness or rashes that lasted for over a month with your previous Prolia injection?   No    4.  Do you have a fever over 101?F or a new deep cough that is unusual for you today? No    5.  Have you started any new medications in the last 6 months that you were told could affect your immune system? These may have been prescribed by oncologist, transplant, rheumatology, or dermatology.   No    6.  In the last 6 months have you have gastric bypass or parathyroid surgery?   No    7.  Do you plan dental work requiring drilling into the bone such as implants/extractions or oral surgery in the next 2-3 months?   No    8. Do you have new insurance since the last injection?    Patient informed if symptoms discussed above present prior to their administration appointment, they are to notify clinic immediately.     Era Carranza RN          The following steps were completed to comply with the REMS program for Prolia:  1. Ordering provider has previously reviewed information in the Medication Guide and Patient Counseling Chart, including the serious risks of Prolia  and the symptoms of each risk and have been advised to seek prompt medical attention if they have signs or symptoms of any of the serious risks.  2. Provided each patient a copy of the Medication Guide and Patient Brochure.  See MAR for administration details.   Indication: Prolia  (denosumab) is a prescription medicine used to treat osteoporosis in patients who:   Are at high risk for fracture, meaning patients who have had a fracture related to osteoporosis, or who have " multiple risk factors for fracture; Cannot use another osteoporosis medicine or other osteoporosis medicines did not work well.   The timeline for early/late injections would be 4 weeks early and any time after the 6 month reina. If a patient receives their injection late, then the subsequent injection would be 6 months from the date that they actually received the injection    Have the screening questions been asked prior to this administration? Yes      The following medication was given:     MEDICATION: Denosumab (Prolia) 60 mg/ml SOLN  ROUTE: SQ  SITE: Arm - Left  DOSE: 60 mg  LOT #: [id number]  :  [employer]  EXPIRATION DATE:  10/23  NDC#: see mar

## 2021-08-05 ENCOUNTER — OFFICE VISIT (OUTPATIENT)
Dept: FAMILY MEDICINE | Facility: CLINIC | Age: 86
End: 2021-08-05
Payer: COMMERCIAL

## 2021-08-05 VITALS
DIASTOLIC BLOOD PRESSURE: 68 MMHG | TEMPERATURE: 97.9 F | OXYGEN SATURATION: 98 % | WEIGHT: 147.1 LBS | BODY MASS INDEX: 29.11 KG/M2 | HEART RATE: 97 BPM | RESPIRATION RATE: 16 BRPM | SYSTOLIC BLOOD PRESSURE: 110 MMHG

## 2021-08-05 DIAGNOSIS — R05.9 COUGH: Primary | ICD-10-CM

## 2021-08-05 PROCEDURE — U0005 INFEC AGEN DETEC AMPLI PROBE: HCPCS | Performed by: PHYSICIAN ASSISTANT

## 2021-08-05 PROCEDURE — U0003 INFECTIOUS AGENT DETECTION BY NUCLEIC ACID (DNA OR RNA); SEVERE ACUTE RESPIRATORY SYNDROME CORONAVIRUS 2 (SARS-COV-2) (CORONAVIRUS DISEASE [COVID-19]), AMPLIFIED PROBE TECHNIQUE, MAKING USE OF HIGH THROUGHPUT TECHNOLOGIES AS DESCRIBED BY CMS-2020-01-R: HCPCS | Performed by: PHYSICIAN ASSISTANT

## 2021-08-05 PROCEDURE — 99213 OFFICE O/P EST LOW 20 MIN: CPT | Performed by: PHYSICIAN ASSISTANT

## 2021-08-05 RX ORDER — CODEINE PHOSPHATE/GUAIFENESIN 10-100MG/5
5 LIQUID (ML) ORAL
Qty: 35 ML | Refills: 0 | Status: SHIPPED | OUTPATIENT
Start: 2021-08-05 | End: 2021-08-12

## 2021-08-05 NOTE — PATIENT INSTRUCTIONS
You were seen today for a cough. This is likely due to a virus and will improve over the next 1-2 weeks on its own.    Symptom management:  - Drink plenty of non-caffeinated fluids  - Avoid smoke exposure  - May use tylenol or ibuprofen for discomfort  - Drink a warm non-caffeinated tea with honey  - Place a warm humidifier in your bedroom at night  - [employer] VaporRub    Reasons to return for re-evaluation:  - Develop a fever 100.4 or higher, current fever worsens, or fever does not improve in 72 hours  - Difficulty breathing or shortness of breath  - Cough continues to worsen including coughing up blood or coughing up thick, colored phlegm  - Unable to tolerate fluids    Otherwise, if symptoms have not improved in 7 days, follow-up with your primary care provider.

## 2021-08-05 NOTE — PROGRESS NOTES
Assessment & Plan:      Problem List Items Addressed This Visit     None      Visit Diagnoses     Cough    -  Primary    Relevant Medications    guaiFENesin-codeine (GUAIFENESIN AC) 100-10 MG/5ML syrup    Other Relevant Orders    Symptomatic COVID-19 Virus (Coronavirus) by PCR Nasopharyngeal        Medical Decision Making  Patient presents with acute onset cough and fatigue.  Patient's physical exam appeared reassuring with no signs of respiratory distress, clear lung auscultation, and good oxygen saturation of 98% on room air.  Patient has noted good relief with codeine cough syrup in the past, and thinks her at home prescription of this medicine has likely .  Will represcribe codeine cough syrup to be used at bedtime as needed.  Otherwise recommended drinking plenty of clear fluids and using honey as needed for cough and congestion.  Discussed signs of worsening symptoms and when to follow-up with PCP if no symptom improvement.     Subjective:      History provided by the patient.  She is also here with her .  Silvia Avila is a 86 year old female here for evaluation of cough and fatigue.  Onset of symptoms was 3 days ago.  Associated symptoms include rhinorrhea and sinus congestion.  Patient otherwise denies sore throat, chest heaviness, shortness of breath.  She has previously been vaccinated for COVID-19.  No known contact with COVID-19.     The following portions of the patient's history were reviewed and updated as appropriate: allergies, current medications, and problem list.     Review of Systems  Pertinent items are noted in HPI.    Allergies  Allergies   Allergen Reactions     Carrot [Daucus Carota] Hives     Only raw. Tolerates cooked.     Metformin Diarrhea       Family History   Problem Relation Age of Onset     No Known Problems Mother      No Known Problems Father      Dementia Sister      Ovarian Cancer Sister 70.00     Dementia Brother        Social History     Tobacco Use      Smoking status: Never Smoker     Smokeless tobacco: Never Used   Substance Use Topics     Alcohol use: Yes     Alcohol/week: 1.0 standard drinks     Comment: Alcoholic Drinks/day: occasional glass of wine        Objective:      /68 (BP Location: Right arm, Patient Position: Sitting, Cuff Size: Adult Regular)   Pulse 97   Temp 97.9  F (36.6  C) (Oral)   Resp 16   Wt 66.7 kg (147 lb 1.6 oz)   SpO2 98%   BMI 29.11 kg/m    General appearance - Alert, slightly fatigued, no distress, nontoxic  Ears - bilateral TM's and external ear canals normal  Nose - normal and patent, no erythema, discharge or polyps  Mouth - mucous membranes moist, pharynx normal without lesions  Neck - supple, no significant adenopathy  Chest - clear to auscultation, no wheezes, rales or rhonchi, symmetric air entry  Heart - normal rate, regular rhythm, normal S1, S2, no murmurs, rubs, clicks or gallops  Skin - normal coloration, no rashes, no suspicious skin lesions noted     Lab & Imaging Results    Results for orders placed or performed in visit on 08/05/21 (from the past 24 hour(s))   Symptomatic COVID-19 Virus (Coronavirus) by PCR Nasopharyngeal    Specimen: Nasopharyngeal; Swab    Narrative    The following orders were created for panel order Symptomatic COVID-19 Virus (Coronavirus) by PCR Nasopharyngeal.  Procedure                               Abnormality         Status                     ---------                               -----------         ------                     SARS-COV2 (COVID-19) Vir...[[id number]]                      In process                   Please view results for these tests on the individual orders.       I personally reviewed these results and discussed findings with the patient.

## 2021-08-06 LAB — SARS-COV-2 RNA RESP QL NAA+PROBE: NEGATIVE

## 2021-08-11 ENCOUNTER — HOSPITAL ENCOUNTER (OUTPATIENT)
Dept: PHYSICAL THERAPY | Facility: REHABILITATION | Age: 86
End: 2021-08-11
Payer: COMMERCIAL

## 2021-08-11 DIAGNOSIS — M75.41 IMPINGEMENT SYNDROME OF SHOULDER REGION, RIGHT: ICD-10-CM

## 2021-08-11 DIAGNOSIS — M77.8 RIGHT SHOULDER TENDINITIS: Primary | ICD-10-CM

## 2021-08-11 PROCEDURE — 97140 MANUAL THERAPY 1/> REGIONS: CPT | Mod: GP | Performed by: PHYSICAL THERAPIST

## 2021-08-11 PROCEDURE — 97110 THERAPEUTIC EXERCISES: CPT | Mod: GP | Performed by: PHYSICAL THERAPIST

## 2021-08-11 NOTE — PROGRESS NOTES
[facility] Daily Progress     Patient Name: Silvia Avila  : 1935  Date of visit: 2021  Visit #:   POC Dates: 21-10/19/21  Referral Diagnosis: Right Shoulder pain  Referring provider: Chuy Avila MD  Visit Diagnosis: Right shoulder pain       Assessment:     Pt returns for a follow-up and feels the pain levels are less. She does demonstrate an improvement in her AROM measurements today.   She feels this is making it easier to do some tasks at home.    Did not add ultrasound today hoping to get more pain relief with just manual therapy today. May still add over the next visit or two.    Patient is appropriate to continue with skilled physical therapy intervention, as indicated by initial plan of care.    She feels her left shoulder is painful now as well and inquired about treatment.  Edu pt I would need to do an evaluation before I could do any treatment on it so she will try heat at home first.     Goals:  See note    Plan / Patient Education:   Plan:    measurements  Pulleys  Isometrics for shoulder-painfree  Manual therapy  ? ultrasound    Subjective:     Pain Ratin when using it                       0 at rest  Client Self Report:    I had a bug so had to cancel last visit.  I have been achy on both shoulders since them.   Have been doing the exercises and have not had any pain    Objective:     Patient Active Problem List   Diagnosis     Anemia     Type 2 diabetes mellitus (H)     Anxiety     Hypertension     Mixed hyperlipidemia     IgA monoclonal gammopathy of uncertain significance     Personal history of breast cancer     Bilateral sensorineural hearing loss     Gastroesophageal reflux disease     Osteopenia       AROM Measurements in degrees (sitting):   Date:                              21  R Shoulder flexion:        80 and pain     102, pain    R Shoulder abduction:  80 and pain     92, pain    PROM Measurements in  degrees:  Date:                               7/22/21  R Shoulder flex:              170-pain  R Shoulder abd:              165-pain    Treatment Today         Date 8/11/21 7/30/21 7/26/21   Exercise      HEP:  Supine cane flex, standing cane abd, wall walk      Pulley's for shoulder flex and abd Hold 3-5 seconds X7 Hold 3-5 seconds x 4 Hold 3-5 seconds x 4   UBE 3' 3'                                                                TREATMENT MINUTES COMMENTS   Evaluation     Self-care/ Home management     Manual therapy 15 Pt sitting in chair with pillow under arm:  Manual therapy right infraspinatus, deltoids,    Neuromuscular Re-education     Therapeutic Activity     Therapeutic Exercises 15 See flow sheets above     Gait training     Modality__________________                Total 30    Blank areas are intentional and mean the treatment did not include these items.       Lo Cunningham, PT  8/11/2021

## 2021-08-13 ENCOUNTER — HOSPITAL ENCOUNTER (OUTPATIENT)
Dept: PHYSICAL THERAPY | Facility: REHABILITATION | Age: 86
End: 2021-08-13
Payer: COMMERCIAL

## 2021-08-13 DIAGNOSIS — M75.41 IMPINGEMENT SYNDROME OF SHOULDER REGION, RIGHT: ICD-10-CM

## 2021-08-13 DIAGNOSIS — M77.8 RIGHT SHOULDER TENDINITIS: Primary | ICD-10-CM

## 2021-08-13 PROCEDURE — 97140 MANUAL THERAPY 1/> REGIONS: CPT | Mod: GP | Performed by: PHYSICAL THERAPIST

## 2021-08-13 PROCEDURE — 97110 THERAPEUTIC EXERCISES: CPT | Mod: GP | Performed by: PHYSICAL THERAPIST

## 2021-08-13 NOTE — PROGRESS NOTES
[facility] Daily Progress     Patient Name: Silvia Avila  : 1935  Date of visit: 2021  Visit #:   POC Dates: 21-10/19/21  Referral Diagnosis: Right Shoulder pain  Referring provider: Chuy Avila MD  Visit Diagnosis: Right shoulder pain       Assessment:     Pt returns for a follow-up and feels the pain levels continue to slowly decrease.  She feels the therapy is helping with the pain levels, albeit maybe slower than she hoped.  May add the ultrasound in next visit to see if the combination of manual therapy and ultrasound helps more.       Patient is appropriate to continue with skilled physical therapy intervention, as indicated by initial plan of care.    Goals:  See note    Plan / Patient Education:   Plan:    Pulleys  Isometrics for shoulder-painfree  Manual therapy   ultrasound-determine if needed    Subjective:     Pain Ratin when using it-same as last visit                       0 at rest  Client Self Report:    I think I am a little better.  Maybe I can raise it up with a little less pain.   I got relief after the last visit for an hour or two.   Left shoulder hurts off and on    Objective:     Patient Active Problem List   Diagnosis     Anemia     Type 2 diabetes mellitus (H)     Anxiety     Hypertension     Mixed hyperlipidemia     IgA monoclonal gammopathy of uncertain significance     Personal history of breast cancer     Bilateral sensorineural hearing loss     Gastroesophageal reflux disease     Osteopenia       AROM Measurements in degrees (sitting):   Date:                              21  R Shoulder flexion:        80 and pain     102, pain    R Shoulder abduction:  80 and pain     92, pain    PROM Measurements in degrees:  Date:                               21  R Shoulder flex:              170-pain  R Shoulder abd:              1 3-5-pain    Treatment Today         Date 21    Exercise       HEP:  Supine cane flex, standing cane abd, wall walk       Pulley's for shoulder flex and abd HOLD 3-5 seconds X 5 Hold 3-5 seconds X7 Hold 3-5 seconds x 4 Hold 3-5 seconds x 4   UBE 3' 3' 3'    shld isometric flex and ext,  Painfree, light pressure Hold 3-5 seconds X 5-10 reps                                                                    TREATMENT MINUTES COMMENTS   Evaluation     Self-care/ Home management     Manual therapy 15 Pt sitting in chair with pillow under arm:  Manual therapy right infraspinatus, deltoids,    Neuromuscular Re-education     Therapeutic Activity     Therapeutic Exercises 15 See flow sheets above     Gait training     Modality__________________                Total 30    Blank areas are intentional and mean the treatment did not include these items.       Lo Cunningham, PT  8/13/2021

## 2021-08-18 ENCOUNTER — HOSPITAL ENCOUNTER (OUTPATIENT)
Dept: PHYSICAL THERAPY | Facility: REHABILITATION | Age: 86
End: 2021-08-18
Payer: COMMERCIAL

## 2021-08-18 DIAGNOSIS — M77.8 RIGHT SHOULDER TENDINITIS: Primary | ICD-10-CM

## 2021-08-18 DIAGNOSIS — M75.41 IMPINGEMENT SYNDROME OF SHOULDER REGION, RIGHT: ICD-10-CM

## 2021-08-18 PROCEDURE — 97140 MANUAL THERAPY 1/> REGIONS: CPT | Mod: GP | Performed by: PHYSICAL THERAPIST

## 2021-08-18 PROCEDURE — 97110 THERAPEUTIC EXERCISES: CPT | Mod: GP | Performed by: PHYSICAL THERAPIST

## 2021-08-18 NOTE — PROGRESS NOTES
[facility] Daily Progress     Patient Name: Silvia Avila  : 1935  Date of visit: 2021  Visit #:   POC Dates: 21-10/19/21  Referral Diagnosis: Right Shoulder pain  Referring provider: Chuy Avila MD  Visit Diagnosis: Right shoulder pain       Assessment:     Pt returns for a follow-up and feels the pain levels continue.  This time it was from sleeping on her right side.  She did feel a slight pull while doing the UBE today.  She will think about if she feels therapy is helping as much as she hopes it will over the next couple visits.     Patient is appropriate to continue with skilled physical therapy intervention, as indicated by initial plan of care.    Goals:  See note    Plan / Patient Education:   Plan:    Isometrics for shoulder-painfree  Manual therapy   ultrasound-determine if needed    Subjective:     Pain Ratin when using it-same as last visit, tight                       0 at rest  Client Self Report:    I think my shoulder is very tight today.  Maybe I slept on it wrong.  I can still lift it but it hurts  .I got 2-3 hours of relief after last visit       Objective:     Patient Active Problem List   Diagnosis     Anemia     Type 2 diabetes mellitus (H)     Anxiety     Hypertension     Mixed hyperlipidemia     IgA monoclonal gammopathy of uncertain significance     Personal history of breast cancer     Bilateral sensorineural hearing loss     Gastroesophageal reflux disease     Osteopenia       AROM Measurements in degrees (sitting):   Date:                              21  R Shoulder flexion:        80 and pain     102, pain    R Shoulder abduction:  80 and pain     92, pain    PROM Measurements in degrees:  Date:                               21  R Shoulder flex:              170-pain  R Shoulder abd:              1 3-5-pain    Treatment Today         Date 21   Exercise         HEP:  Supine cane flex, standing cane abd, wall walk        Pulley's for shoulder flex and abd  HOLD 3-5 seconds X 5 Hold 3-5 seconds X7 Hold 3-5 seconds x 4 Hold 3-5 seconds x 4   UBE 3' 3' 3' 3'    shld isometric flex and ext,  Painfree, light pressure Hold 5 seconds X 5 reps Hold 3-5 seconds X 5-10 reps                                                                            TREATMENT MINUTES COMMENTS   Evaluation     Self-care/ Home management     Manual therapy 10 Pt sitting in chair with pillow under arm:  Manual therapy right infraspinatus, deltoids,    Neuromuscular Re-education     Therapeutic Activity     Therapeutic Exercises 15 See flow sheets above     Gait training     Modality__________________                Total 25    Blank areas are intentional and mean the treatment did not include these items.       Lo Cunningham, PT  8/18/2021

## 2021-08-20 ENCOUNTER — HOSPITAL ENCOUNTER (OUTPATIENT)
Dept: PHYSICAL THERAPY | Facility: REHABILITATION | Age: 86
End: 2021-08-20
Payer: COMMERCIAL

## 2021-08-20 DIAGNOSIS — M77.8 RIGHT SHOULDER TENDINITIS: Primary | ICD-10-CM

## 2021-08-20 DIAGNOSIS — M75.41 IMPINGEMENT SYNDROME OF SHOULDER REGION, RIGHT: ICD-10-CM

## 2021-08-20 PROCEDURE — 97035 APP MDLTY 1+ULTRASOUND EA 15: CPT | Mod: GP | Performed by: PHYSICAL THERAPIST

## 2021-08-20 PROCEDURE — 97140 MANUAL THERAPY 1/> REGIONS: CPT | Mod: GP | Performed by: PHYSICAL THERAPIST

## 2021-08-20 NOTE — PROGRESS NOTES
[facility] Daily Progress     Patient Name: Silvia Avila  : 1935  Date of visit: 2021  Visit #:   POC Dates: 21-10/19/21  Referral Diagnosis: Right Shoulder pain  Referring provider: Chuy Avila MD  Visit Diagnosis: Right shoulder pain       Assessment:     Pt returns for a follow-up and feels the pain levels continue.   Her ABD AROM is decreased compared to the last measurement and still limited by pain.  Added ultrasound with manual therapy to see if both modalities will be more successful for pain control.  Also discussed reterning to her MD for a follow-up.        Patient is appropriate to continue with skilled physical therapy intervention, as indicated by initial plan of care.    Goals:  See note    Plan / Patient Education:   Plan:  Manual therapy   ultrasound-  2 more visits to determine effectiveness of ultrasound and manual therapy combination    Subjective:     Pain Rating: 3                        0 at rest  Client Self Report:    I don't think there has been any change since last visit       Objective:     Patient Active Problem List   Diagnosis     Anemia     Type 2 diabetes mellitus (H)     Anxiety     Hypertension     Mixed hyperlipidemia     IgA monoclonal gammopathy of uncertain significance     Personal history of breast cancer     Bilateral sensorineural hearing loss     Gastroesophageal reflux disease     Osteopenia       AROM Measurements in degrees (sitting):   Date:                              21  R Shoulder flexion:     80 and pain   102, pain   100, pain    R Shoulder abduction:  80 and pain   92, pain  78, pain    PROM Measurements in degrees:  Date:                               21  R Shoulder flex:              170-pain  R Shoulder abd:              1 3-5-pain    Treatment Today         Date 21   Exercise         HEP:  Supine cane flex, standing  cane abd, wall walk         Pulley's for shoulder flex and abd   HOLD 3-5 seconds X 5 Hold 3-5 seconds X7 Hold 3-5 seconds x 4 Hold 3-5 seconds x 4   UBE 3' 3' 3' 3' 3'    shld isometric flex and ext,  Painfree, light pressure  Hold 5 seconds X 5 reps Hold 3-5 seconds X 5-10 reps                                                                                    TREATMENT MINUTES COMMENTS   Evaluation     Self-care/ Home management     Manual therapy 15 Pt sitting in chair with pillow under arm:  Manual therapy right infraspinatus, deltoids,    Neuromuscular Re-education     Therapeutic Activity     Therapeutic Exercises 5 See flow sheets above  UBE, measurements   Gait training     Modality__________________     ultrasound 8+2 set up Pt sitting, 100%, 1.0w/cm2, 1W/cm2, right anterior/posterior shoulder         Total 30    Blank areas are intentional and mean the treatment did not include these items.       Lo Cunningham, PT  8/20/2021

## 2021-08-25 ENCOUNTER — HOSPITAL ENCOUNTER (OUTPATIENT)
Dept: PHYSICAL THERAPY | Facility: REHABILITATION | Age: 86
End: 2021-08-25
Payer: COMMERCIAL

## 2021-08-25 DIAGNOSIS — M75.41 IMPINGEMENT SYNDROME OF SHOULDER REGION, RIGHT: ICD-10-CM

## 2021-08-25 DIAGNOSIS — M77.8 RIGHT SHOULDER TENDINITIS: Primary | ICD-10-CM

## 2021-08-25 PROCEDURE — 97035 APP MDLTY 1+ULTRASOUND EA 15: CPT | Mod: GP | Performed by: PHYSICAL THERAPIST

## 2021-08-25 PROCEDURE — 97140 MANUAL THERAPY 1/> REGIONS: CPT | Mod: GP | Performed by: PHYSICAL THERAPIST

## 2021-08-27 ENCOUNTER — HOSPITAL ENCOUNTER (OUTPATIENT)
Dept: PHYSICAL THERAPY | Facility: REHABILITATION | Age: 86
End: 2021-08-27
Payer: COMMERCIAL

## 2021-08-27 DIAGNOSIS — M77.8 RIGHT SHOULDER TENDINITIS: Primary | ICD-10-CM

## 2021-08-27 DIAGNOSIS — M75.41 IMPINGEMENT SYNDROME OF SHOULDER REGION, RIGHT: ICD-10-CM

## 2021-08-27 PROCEDURE — 97140 MANUAL THERAPY 1/> REGIONS: CPT | Mod: GP | Performed by: PHYSICAL THERAPIST

## 2021-08-27 PROCEDURE — 97035 APP MDLTY 1+ULTRASOUND EA 15: CPT | Mod: GP | Performed by: PHYSICAL THERAPIST

## 2021-08-27 NOTE — PROGRESS NOTES
[facility] Daily Progress     Patient Name: Silvia Avila  : 1935  Date of visit: 2021  Visit #:  POC Dates: 21-10/19/21  Referral Diagnosis: Right Shoulder pain  Referring provider: Chuy Avila MD  Visit Diagnosis: Right shoulder pain       Assessment:     Pt returns for a follow-up and feels the pain levels continue.  She isn't sure how much of a relief she got after the last visit, but she knows it all came back.       Goals:  See note    Plan / Patient Education:   Plan:  Will hold chart for 30 days.  If pt does not schedule within that time her chart will be discharged.  Pt is in agreement with the plan.  Pt to see Dr. Avila next week.     Subjective:     Pain Ratin                        0 at rest  Client Self Report:      Think the shoulder feels a little better.  Unsure how long the relief lasted.    I see my MD next Friday.      Objective:     Patient Active Problem List   Diagnosis     Anemia     Type 2 diabetes mellitus (H)     Anxiety     Hypertension     Mixed hyperlipidemia     IgA monoclonal gammopathy of uncertain significance     Personal history of breast cancer     Bilateral sensorineural hearing loss     Gastroesophageal reflux disease     Osteopenia       AROM Measurements in degrees (sitting):   Date:                              21  R Shoulder flexion:     80 and pain   102, pain   100, pain    R Shoulder abduction:  80 and pain   92, pain  78, pain    PROM Measurements in degrees:  Date:                               21  R Shoulder flex:              170-pain  R Shoulder abd:              1 3-5-pain    Treatment Today         Date 21   Exercise          HEP:  Supine cane flex, standing cane abd, wall walk          Pulley's for shoulder flex and abd    HOLD 3-5 seconds X 5 Hold 3-5 seconds X7 Hold 3-5 seconds x 4 Hold 3-5 seconds x 4    UBE 3' 3' 3' 3' 3' 3'    shld isometric flex and ext,  Painfree, light pressure   Hold 5 seconds X 5 reps Hold 3-5 seconds X 5-10 reps                                                                                            TREATMENT MINUTES COMMENTS   Evaluation     Self-care/ Home management     Manual therapy 15 Pt sitting in chair with pillow under arm:  Manual therapy right infraspinatus, deltoids,    Neuromuscular Re-education     Therapeutic Activity     Therapeutic Exercises Not today See flow sheets above  UBE, measurements   Gait training     Modality__________________     ultrasound 8+2 set up Pt sitting, 100%, 1.0w/cm2, 1W/cm2, right superior/posterior shoulder         Total 25    Blank areas are intentional and mean the treatment did not include these items.       Lo Cunningham, PT  8/27/2021

## 2021-09-03 ENCOUNTER — OFFICE VISIT (OUTPATIENT)
Dept: INTERNAL MEDICINE | Facility: CLINIC | Age: 86
End: 2021-09-03
Payer: COMMERCIAL

## 2021-09-03 VITALS
HEART RATE: 92 BPM | BODY MASS INDEX: 29.09 KG/M2 | SYSTOLIC BLOOD PRESSURE: 112 MMHG | WEIGHT: 147 LBS | DIASTOLIC BLOOD PRESSURE: 64 MMHG

## 2021-09-03 DIAGNOSIS — M75.101 ROTATOR CUFF SYNDROME, RIGHT: Primary | ICD-10-CM

## 2021-09-03 PROCEDURE — 99207 PR NO CHARGE LOS: CPT | Performed by: INTERNAL MEDICINE

## 2021-09-03 PROCEDURE — 20610 DRAIN/INJ JOINT/BURSA W/O US: CPT | Mod: RT | Performed by: INTERNAL MEDICINE

## 2021-09-03 RX ADMIN — TRIAMCINOLONE ACETONIDE 40 MG: 40 INJECTION, SUSPENSION INTRA-ARTICULAR; INTRAMUSCULAR at 16:23

## 2021-09-03 NOTE — PROGRESS NOTES
Silvia comes in today for follow-up of her right shoulder pain.  She is attended 9 sessions of physical therapy over the last 2 months and states that she has not made much progress.  She is looking for additional treatment today I recommended a corticosteroid injection which she consented to.  The right shoulder was prepped in the usual fashion, and a combination of 7 cc of 1% lidocaine 1 cc of Kenalog were injected into the right subacromial space in a posterior fashion.  Patient tolerated the procedure well and had no immediate complications.  Follow-up with us as needed.

## 2021-10-01 RX ORDER — TRIAMCINOLONE ACETONIDE 40 MG/ML
40 INJECTION, SUSPENSION INTRA-ARTICULAR; INTRAMUSCULAR ONCE
Status: COMPLETED | OUTPATIENT
Start: 2021-09-03 | End: 2021-09-03

## 2021-10-06 NOTE — ADDENDUM NOTE
Encounter addended by: Lo Cunningham, PT on: 10/6/2021 11:49 AM   Actions taken: Episode resolved, Clinical Note Signed

## 2021-10-06 NOTE — PROGRESS NOTES
Outpatient Physical Therapy Discharge Note     Patient: Silvia Avila  : 1935    Beginning/End Dates of Reporting Period:  21  to 21    Referring Provider: Dr. Avila    Therapy Diagnosis: Right Shoulder pain     Client Self Report: see note    Goals:  Goal Identifier sleep   Goal Description able to sleep through the night without waking due to pain and able to lay on right side   Target Date 10/19/21   Date Met      Progress (detail required for progress note): still difficult due to pain.  some times are better than others     Goal Identifier reaching   Goal Description able to reach up into cupboards and get a plate as AROM imporves as well as emptying the    Target Date 10/19/21   Date Met      Progress (detail required for progress note): it still pulls and causes some pain     Goal Identifier     Goal Description     Target Date     Date Met      Progress (detail required for progress note):       Goal Identifier     Goal Description     Target Date     Date Met      Progress (detail required for progress note):       Goal Identifier     Goal Description     Target Date     Date Met      Progress (detail required for progress note):       Goal Identifier     Goal Description     Target Date     Date Met      Progress (detail required for progress note):       Goal Identifier     Goal Description     Target Date     Date Met      Progress (detail required for progress note):       Goal Identifier     Goal Description     Target Date     Date Met      Progress (detail required for progress note):             Plan:  Discharge from therapy.    Discharge:    Reason for Discharge: Held chart for 30 days.  She was feeling like she was starting to plateau.  She felt better overall but continued to have some pain.  If the pain persisted she was going to Follow-up with her PCP.    Equipment Issued:     Discharge Plan: Patient to continue home program.

## 2021-10-10 ENCOUNTER — HEALTH MAINTENANCE LETTER (OUTPATIENT)
Age: 86
End: 2021-10-10

## 2021-12-05 ENCOUNTER — HEALTH MAINTENANCE LETTER (OUTPATIENT)
Age: 86
End: 2021-12-05

## 2021-12-12 DIAGNOSIS — E78.5 HYPERLIPIDEMIA: ICD-10-CM

## 2021-12-12 DIAGNOSIS — K21.9 ESOPHAGEAL REFLUX: ICD-10-CM

## 2021-12-14 RX ORDER — ATORVASTATIN CALCIUM 40 MG/1
TABLET, FILM COATED ORAL
Qty: 90 TABLET | Refills: 3 | Status: SHIPPED | OUTPATIENT
Start: 2021-12-14 | End: 2022-12-07

## 2021-12-14 NOTE — TELEPHONE ENCOUNTER
"Routing refill request to provider for review/approval because:  Labs not current:  LDL    Last Written Prescription Date:  12/31/20  Last Fill Quantity: 90,  # refills: 3   Last office visit provider:  9/3/21     Requested Prescriptions   Pending Prescriptions Disp Refills     atorvastatin (LIPITOR) 40 MG tablet [Pharmacy Med Name: ATORVASTATIN 40MG TABLETS] 90 tablet 3     Sig: TAKE 1 TABLET(40 MG) BY MOUTH AT BEDTIME       Statins Protocol Failed - 12/12/2021  6:00 AM        Failed - LDL on file in past 12 months     Recent Labs   Lab Test 09/28/20  0940   LDL 93             Passed - No abnormal creatine kinase in past 12 months     No lab results found.             Passed - Recent (12 mo) or future (30 days) visit within the authorizing provider's specialty     Patient has had an office visit with the authorizing provider or a provider within the authorizing providers department within the previous 12 mos or has a future within next 30 days. See \"Patient Info\" tab in inbasket, or \"Choose Columns\" in Meds & Orders section of the refill encounter.              Passed - Medication is active on med list        Passed - Patient is age 18 or older        Passed - No active pregnancy on record        Passed - No positive pregnancy test in past 12 months         Signed Prescriptions Disp Refills    omeprazole (PRILOSEC) 20 MG DR capsule 90 capsule 3     Sig: TAKE 1 CAPSULE(20 MG) BY MOUTH DAILY BEFORE BREAKFAST       PPI Protocol Passed - 12/12/2021  6:00 AM        Passed - Not on Clopidogrel (unless Pantoprazole ordered)        Passed - No diagnosis of osteoporosis on record        Passed - Recent (12 mo) or future (30 days) visit within the authorizing provider's specialty     Patient has had an office visit with the authorizing provider or a provider within the authorizing providers department within the previous 12 mos or has a future within next 30 days. See \"Patient Info\" tab in inbasket, or \"Choose Columns\" in " Meds & Orders section of the refill encounter.              Passed - Medication is active on med list        Passed - Patient is age 18 or older        Passed - No active pregnacy on record        Passed - No positive pregnancy test in past 12 months             Abraham Gilman RN 12/14/21 8:15 AM

## 2021-12-14 NOTE — TELEPHONE ENCOUNTER
"Last Written Prescription Date:  12/31/20  Last Fill Quantity: 90,  # refills: 3   Last office visit provider:  9/3/21     Requested Prescriptions   Pending Prescriptions Disp Refills     atorvastatin (LIPITOR) 40 MG tablet [Pharmacy Med Name: ATORVASTATIN 40MG TABLETS] 90 tablet 3     Sig: TAKE 1 TABLET(40 MG) BY MOUTH AT BEDTIME       Statins Protocol Failed - 12/12/2021  6:00 AM        Failed - LDL on file in past 12 months     Recent Labs   Lab Test 09/28/20  0940   LDL 93             Passed - No abnormal creatine kinase in past 12 months     No lab results found.             Passed - Recent (12 mo) or future (30 days) visit within the authorizing provider's specialty     Patient has had an office visit with the authorizing provider or a provider within the authorizing providers department within the previous 12 mos or has a future within next 30 days. See \"Patient Info\" tab in [employer], or \"Choose Columns\" in Meds & Orders section of the refill encounter.              Passed - Medication is active on med list        Passed - Patient is age 18 or older        Passed - No active pregnancy on record        Passed - No positive pregnancy test in past 12 months           omeprazole (PRILOSEC) 20 MG DR capsule [Pharmacy Med Name: OMEPRAZOLE 20MG CAPSULES] 90 capsule 3     Sig: TAKE 1 CAPSULE(20 MG) BY MOUTH DAILY BEFORE BREAKFAST       PPI Protocol Passed - 12/12/2021  6:00 AM        Passed - Not on Clopidogrel (unless Pantoprazole ordered)        Passed - No diagnosis of osteoporosis on record        Passed - Recent (12 mo) or future (30 days) visit within the authorizing provider's specialty     Patient has had an office visit with the authorizing provider or a provider within the authorizing providers department within the previous 12 mos or has a future within next 30 days. See \"Patient Info\" tab in inbasket, or \"Choose Columns\" in Meds & Orders section of the refill encounter.              Passed - Medication is " active on med list        Passed - Patient is age 18 or older        Passed - No active pregnacy on record        Passed - No positive pregnancy test in past 12 months             Abraham Gilman RN 12/14/21 8:14 AM

## 2022-01-11 NOTE — PROGRESS NOTES
Silvia is a 86 year old who is being evaluated via a billable telephone visit.      What phone number would you like to be contacted at? [phone]   How would you like to obtain your AVS? [facility]  ENDOCRINOLOGY    Osteoporosis Follow Up 1/24/2022    Silvia Avila, 1935, [id number]          Reason for visit      1. Osteoporosis without current pathological fracture, unspecified osteoporosis type        History     Silvia Avila is a very pleasant 86 year old old female who presents for follow up.   SUMMARY:  Silvia Avila did have a breast cancer in 1997 treated with surgery radiation chemotherapy and she has been on Evista since that time.  She then had a follow-up bone density test done in June 2014 and at that time it showed that she was significantly below baseline and trabecular bone score was poor.  Her fracture risk was calculated to be high and in view of her decline on raloxifene. She is here to consider alternative treatment. She has a history of significant GI distress and requiring daily H2 blocker use and her calculated fracture risk would be considered high at this point.  I believe in the context of her current bone density including the low trabecular bone score she would be a candidate for denosumab.  We had a lengthy discussion about this and she is in agreement.    TODAY:    Silvia is contacted today in f/u for Osteoporosis. She continues on Prolia injections without any difficulties. She has had no falls in the last year. Her last Dexa Scan of 1/2020 showed stability. She is due for another now.  She reports that she is doing some walking, but not a lot. She is taking Vit D daily. Her current level is 52 and up a bit from last year.  Calcium level was 9.2.     Risk Factors     The following high- risk conditions have been ruled out: celiac disease, eating disorders, gastric bypass, hyperparathyroidism, inflammatory bowel disease, hyperthyroidism, rheumatoid  arthritis, lupus, chronic kidney disease.     Silvia Avila has the following risk factors: Age, Female gender,  and Low BMI     She is not on high risk medications such as glucocorticoids, anti-coagulants, anti-convulsants, chemotherapy or levothyroxine.    Patient deniesHysterectomy, Oophrectomy, Breast cancer and Family history of breast cancer.         Past Medical History     Patient Active Problem List   Diagnosis     Anemia     Type 2 diabetes mellitus (H)     Anxiety     Hypertension     Mixed hyperlipidemia     IgA monoclonal gammopathy of uncertain significance     Personal history of breast cancer     Bilateral sensorineural hearing loss     Gastroesophageal reflux disease     Osteopenia       Family History       family history includes Dementia in her brother and sister; No Known Problems in her father and mother; Ovarian Cancer (age of onset: 70.00) in her sister.    Social History      reports that she has never smoked. She has never used smokeless tobacco. She reports current alcohol use of about 1.0 standard drink of alcohol per week. She reports that she does not use drugs.      Review of Systems     Patient denies current pain, limited mobility, fractures.   Remainder per HPI.      Vital Signs     There were no vitals taken for this visit.    Physical Exam         Assessment     1. Osteoporosis without current pathological fracture, unspecified osteoporosis type        Plan     Pt is due for another Dexa scan. She will continue on the Prolia injections. F/u with me in 1 year.         Nadia Atkins NP  [facility]  1/24/2022  10:29 AM        Current Medications     Outpatient Medications Prior to Visit   Medication Sig Dispense Refill     atorvastatin (LIPITOR) 40 MG tablet TAKE 1 TABLET(40 MG) BY MOUTH AT BEDTIME 90 tablet 3     blood glucose meter (GLUCOMETER) [BLOOD GLUCOSE METER (GLUCOMETER)] Test one time daily. Dispense glucometer brand per patient's insurance at pharmacy  discretion. 1 each 0     blood glucose test strips [BLOOD GLUCOSE TEST STRIPS] Test one time daily. Dispense brand per patient's insurance at pharmacy discretion. 100 strip 11     CALCITRATE 200 mg (950 mg) tablet [CALCITRATE 200 MG (950 MG) TABLET] TAKE 1 TABLET BY MOUTH DAILY 90 tablet 0     cholecalciferol, vitamin D3, (VITAMIN D3) 2,000 unit cap [CHOLECALCIFEROL, VITAMIN D3, (VITAMIN D3) 2,000 UNIT CAP] Take 1 capsule by mouth daily.       clobetasol (TEMOVATE) 0.05 % cream [CLOBETASOL (TEMOVATE) 0.05 % CREAM] Apply 1 application topically 2 (two) times a day as needed. Apply daily as needed for eczema       generic lancets [GENERIC LANCETS] Test one time daily. Dispense brand per patient's insurance at pharmacy discretion. 100 each 11     LACTOBACILLUS ACIDOPHILUS (PROBIOTIC ORAL) [LACTOBACILLUS ACIDOPHILUS (PROBIOTIC ORAL)] Take 1 tablet by mouth daily.        lancets (ONETOUCH DELICA LANCETS) 33 gauge Misc [LANCETS (ONETOUCH DELICA LANCETS) 33 GAUGE MISC] Test twice daily 200 each 2     lisinopriL (PRINIVIL,ZESTRIL) 10 MG tablet [LISINOPRIL (PRINIVIL,ZESTRIL) 10 MG TABLET] Take 1 tablet (10 mg total) by mouth daily. 90 tablet 3     nystatin (MYCOSTATIN) cream [NYSTATIN (MYCOSTATIN) CREAM] Perineum as needed 30 g 0     omeprazole (PRILOSEC) 20 MG DR capsule TAKE 1 CAPSULE(20 MG) BY MOUTH DAILY BEFORE BREAKFAST 90 capsule 3     PARoxetine (PAXIL) 20 MG tablet [PAROXETINE (PAXIL) 20 MG TABLET] TAKE 1 TABLET(20 MG) BY MOUTH DAILY 90 tablet 2     pioglitazone (ACTOS) 15 MG tablet [PIOGLITAZONE (ACTOS) 15 MG TABLET] Take 1 tablet (15 mg total) by mouth daily. 90 tablet 3     Facility-Administered Medications Prior to Visit   Medication Dose Route Frequency Provider Last Rate Last Admin     denosumab (PROLIA) injection 60 mg  60 mg Subcutaneous Q6 Months Larry Hernandez, PharmD   60 mg at 08/03/21 1034         Lab Results     Iron   Date Value Ref Range Status   01/23/2020 92 42 - 175 ug/dL Final           Imaging  Results   Last DEXA scan:  No valid procedures specified.    Study Result    Narrative & Impression   1/3/2020        RE: Silvia Avila  YOB: 1935           Dear [place], Chuy Izquierdo,     Patient Profile:  84 y.o. female, postmenopausal, is here for the follow up bone density test.   History of fractures - None. Family history of osteoporosis - None.  Family history of hip fracture: None. Smoking history - No. Osteoporosis treatment past -  Yes;  HRT, Bisphosphonates and Prolia. Osteoporosis treatment current - No.  Chronic medical   problems - Breast cancer, Diabetes Mellitus, Radiation treatment and Spine surgery. High risk medications -  Chemotherapy;  Yes, in the Past and Aromatase Inhibitor;  Yes, in the Past.        Assessment:     1. The spine bone density L1-L2 with T-score -1.6, stable compared to 2017.  2. Femoral bone densities show left femoral neck T- score -1.8 and right femoral neck T-score -1.9, stable.  3. Trabecular bone score indicates moderate trabecular bone architecture.        84 y.o. female with LOW BONE DENSITY (OSTEOPENIA) and HIGH fracture risk, adjusted for the TBS, with major osteoporotic fracture risk 17.6% and hip fracture risk 5.0%.            Recommendations:  Appropriate evaluation and treatment recommended with follow up bone density scan after 1 year of active treatment.        Bone densitometry was performed on your patient using our [email or web address] densitometer. The results are summarized and a copy of the actual scans are included for your review. In conformity with the International Society of Clinical Densitometry's most   recent position statement for DXA interpretation (2015), the diagnosis will be made on the lowest measured T-score of the lumbar spine, femoral neck, total proximal femur or 33% radius. Note the change in terminology for diagnostic classification from   OSTEOPENIA to LOW BONE MASS. All trending for sequential exams will be done using multiple  vertebrae or the total proximal femur. Fracture risk is based on the WHO Fracture Risk Assessment Tool (FRAX). If additional information is needed or if you would   like to discuss the results, please do not hesitate to call me.         Thank you for referring this patient to [facility]. We are happy to be of service in support of you and your practice. If you have any questions or suggestions to improve our service, please call me at [phone].      Sincerely,      Michelle Zapata M.D. C.C.ADAM.  Osteoporosis Services, [facility]            Phone call duration: 20 minutes        Date of last OV: 1/22/21  Reason for Visit: Osteoporosis    Dexa Scan: 1/03/20  Last Prolia Injection: 8/03/21

## 2022-01-14 ENCOUNTER — LAB (OUTPATIENT)
Dept: LAB | Facility: CLINIC | Age: 87
End: 2022-01-14
Payer: COMMERCIAL

## 2022-01-14 DIAGNOSIS — M81.0 OSTEOPOROSIS WITHOUT CURRENT PATHOLOGICAL FRACTURE, UNSPECIFIED OSTEOPOROSIS TYPE: ICD-10-CM

## 2022-01-14 LAB — CALCIUM SERPL-MCNC: 9.2 MG/DL (ref 8.5–10.5)

## 2022-01-14 PROCEDURE — 82306 VITAMIN D 25 HYDROXY: CPT

## 2022-01-14 PROCEDURE — 36415 COLL VENOUS BLD VENIPUNCTURE: CPT

## 2022-01-14 PROCEDURE — 82310 ASSAY OF CALCIUM: CPT

## 2022-01-16 LAB — DEPRECATED CALCIDIOL+CALCIFEROL SERPL-MC: 51 UG/L (ref 30–80)

## 2022-01-21 ENCOUNTER — VIRTUAL VISIT (OUTPATIENT)
Dept: ENDOCRINOLOGY | Facility: CLINIC | Age: 87
End: 2022-01-21
Payer: COMMERCIAL

## 2022-01-21 DIAGNOSIS — M81.0 OSTEOPOROSIS WITHOUT CURRENT PATHOLOGICAL FRACTURE, UNSPECIFIED OSTEOPOROSIS TYPE: Primary | ICD-10-CM

## 2022-01-21 PROCEDURE — 99213 OFFICE O/P EST LOW 20 MIN: CPT | Mod: TEL | Performed by: NURSE PRACTITIONER

## 2022-01-21 NOTE — Clinical Note
1/21/2022         RE: Silvia Avila  [address] [id number]        Dear Colleague,    Thank you for referring your patient, Silvia Avila, to the [facility]. Please see a copy of my visit note below.    Silvia is a 86 year old who is being evaluated via a billable telephone visit.      What phone number would you like to be contacted at? [phone]   How would you like to obtain your AVS? MyChart  Phone call duration: *** minutes      Date of last OV: 1/22/21  Reason for Visit: Osteoporosis    Dexa Scan: 1/03/20  Last Prolia Injection: 8/03/21        Again, thank you for allowing me to participate in the care of your patient.        Sincerely,        Nadia Atkins NP

## 2022-01-24 DIAGNOSIS — M81.0 OSTEOPOROSIS WITHOUT CURRENT PATHOLOGICAL FRACTURE, UNSPECIFIED OSTEOPOROSIS TYPE: Primary | ICD-10-CM

## 2022-01-24 DIAGNOSIS — Z92.29 PERSONAL HISTORY OF OTHER DRUG THERAPY: ICD-10-CM

## 2022-02-08 ENCOUNTER — TRANSFERRED RECORDS (OUTPATIENT)
Dept: HEALTH INFORMATION MANAGEMENT | Facility: CLINIC | Age: 87
End: 2022-02-08

## 2022-02-08 LAB — RETINOPATHY: POSITIVE

## 2022-03-09 DIAGNOSIS — I10 ESSENTIAL HYPERTENSION, MALIGNANT: ICD-10-CM

## 2022-03-09 RX ORDER — LISINOPRIL 10 MG/1
TABLET ORAL
Qty: 90 TABLET | Refills: 3 | Status: SHIPPED | OUTPATIENT
Start: 2022-03-09 | End: 2023-02-06

## 2022-03-12 DIAGNOSIS — F41.1 ANXIETY STATE: ICD-10-CM

## 2022-03-12 RX ORDER — PAROXETINE 20 MG/1
TABLET, FILM COATED ORAL
Qty: 90 TABLET | Refills: 1 | Status: SHIPPED | OUTPATIENT
Start: 2022-03-12 | End: 2022-09-09

## 2022-03-12 NOTE — TELEPHONE ENCOUNTER
"Last Written Prescription Date:  6/15/21  Last Fill Quantity: 90,  # refills:  2  Last office visit provider:  9/3/21     Requested Prescriptions   Pending Prescriptions Disp Refills     PARoxetine (PAXIL) 20 MG tablet [Pharmacy Med Name: PAROXETINE 20MG TABLETS] 90 tablet 2     Sig: TAKE 1 TABLET(20 MG) BY MOUTH DAILY       SSRIs Protocol Passed - 3/12/2022  5:56 AM        Passed - Recent (12 mo) or future (30 days) visit within the authorizing provider's specialty     Patient has had an office visit with the authorizing provider or a provider within the authorizing providers department within the previous 12 mos or has a future within next 30 days. See \"Patient Info\" tab in inbasket, or \"Choose Columns\" in Meds & Orders section of the refill encounter.              Passed - Medication is active on med list        Passed - Patient is age 18 or older        Passed - No active pregnancy on record        Passed - No positive pregnancy test in last 12 months             Svitlana Randle RN 03/12/22 3:08 PM  "

## 2022-03-16 ENCOUNTER — TRANSFERRED RECORDS (OUTPATIENT)
Dept: HEALTH INFORMATION MANAGEMENT | Facility: CLINIC | Age: 87
End: 2022-03-16

## 2022-03-23 ENCOUNTER — OFFICE VISIT (OUTPATIENT)
Dept: OTHER | Facility: CLINIC | Age: 87
End: 2022-03-23
Payer: COMMERCIAL

## 2022-03-23 VITALS
HEIGHT: 59 IN | BODY MASS INDEX: 29.64 KG/M2 | TEMPERATURE: 97 F | SYSTOLIC BLOOD PRESSURE: 114 MMHG | RESPIRATION RATE: 18 BRPM | WEIGHT: 147 LBS | DIASTOLIC BLOOD PRESSURE: 67 MMHG | HEART RATE: 96 BPM | OXYGEN SATURATION: 98 %

## 2022-03-23 DIAGNOSIS — Z00.00 ENCOUNTER FOR MEDICARE ANNUAL WELLNESS EXAM: Primary | ICD-10-CM

## 2022-03-23 PROCEDURE — G0439 PPPS, SUBSEQ VISIT: HCPCS | Performed by: FAMILY MEDICINE

## 2022-03-23 ASSESSMENT — PAIN SCALES - GENERAL: PAINLEVEL: NO PAIN (0)

## 2022-03-23 ASSESSMENT — ACTIVITIES OF DAILY LIVING (ADL): CURRENT_FUNCTION: NO ASSISTANCE NEEDED

## 2022-03-23 NOTE — PATIENT INSTRUCTIONS
Patient Education   Personalized Prevention Plan  You are due for the preventive services outlined below.  Your care team is available to assist you in scheduling these services.  If you have already completed any of these items, please share that information with your care team to update in your medical record.  Health Maintenance Due   Topic Date Due     Diabetic Foot Exam  Never done     ANNUAL REVIEW OF HM ORDERS  Never done     Zoster (Shingles) Vaccine (2 of 3) 05/18/2011     Annual Wellness Visit  01/23/2021     Basic Metabolic Panel  09/28/2021     Cholesterol Lab  09/28/2021     Kidney Microalbumin Urine Test  09/29/2021     A1C Lab  10/30/2021       Preventing Falls at Home  A person can fall for many reasons. Older adults may fall because reaction time slows down as we age. Your muscles and joints may get stiff, weak, or less flexible because of illness, medicines, or a physical condition.   Other health problems that make falls more likely include:     Arthritis    Dizziness or lightheadedness when you stand up (orthostatic hypotension)    History of a stroke    Dizziness    Anemia    Certain medicines taken for mental illness or to control blood pressure.    Problems with balance or gait    Bladder or urinary problems    History of falling    Changes in vision (vision impairment)    Changes in thinking skills and memory (cognitive impairment)  Injuries from a fall can include serious injuries such as broken bones, dislocated joints, internal bleeding and cuts. Injuries like these can limit your independence.   Prevention tips  To help prevent falls and fall-related injuries, follow the tips below.    Floors  To make floors safer:     Put nonskid pads under area rugs.    Remove small rugs.    Replace worn floor coverings.    Tack carpets firmly to each step on carpeted stairs. Put nonskid strips on the edges of uncarpeted stairs.    Keep floors and stairs free of clutter and cords.    Arrange furniture  so there are clear pathways.    Clean up any spills right away.  Bathrooms    To make bathrooms safer:     Install grab bars in the tub or shower.    Apply nonskid strips or put a nonskid rubber mat in the tub or shower.    Sit on a bath chair to bathe.    Use bathmats with nonskid backing.  Lighting  To improve visibility in your home:      Keep a flashlight in each room. Or put a lamp next to the bed within easy reach.    Put nightlights in the bedrooms, hallways, kitchen, and bathrooms.    Make sure all stairways have good lighting.    Take your time when going up and down stairs.    Put handrails on both sides of stairs and in walkways for more support. To prevent injury to your wrist or arm, don t use handrails to pull yourself up.    Install grab bars to pull yourself up.    Move or rearrange items that you use often. This will make them easier to find or reach.    Look at your home to find any safety hazards. Especially look at doorways, walkways, and the driveway. Remove or repair any safety problems that you find.  Other changes to make    Look around to find any safety hazards. Look closely at doorways, walkways, and the driveway. Remove or repair any safety problems that you find.    Wear shoes that fit well.    Take your time when going up and down stairs.    Put handrails on both sides of stairs and in walkways for more support. To prevent injury to your wrist or arm, don t use handrails to pull yourself up.    Install grab bars wherever needed to pull yourself up.    Arrange items that you use often. This will make them easier to find or reach.    "[employer]" last reviewed this educational content on 3/1/2020    1632-8001 [employer]. All rights reserved. This information is not intended as a substitute for professional medical care. Always follow your healthcare professional's instructions.           Patient Education   Personalized Prevention Plan  You are due for the preventive services  outlined below.  Your care team is available to assist you in scheduling these services.  If you have already completed any of these items, please share that information with your care team to update in your medical record.  Health Maintenance Due   Topic Date Due     Diabetic Foot Exam  Never done     ANNUAL REVIEW OF  ORDERS  Never done     Zoster (Shingles) Vaccine (2 of 3) 05/18/2011     Basic Metabolic Panel  09/28/2021     Cholesterol Lab  09/28/2021     Kidney Microalbumin Urine Test  09/29/2021     A1C Lab  10/30/2021       Preventing Falls at Home  A person can fall for many reasons. Older adults may fall because reaction time slows down as we age. Your muscles and joints may get stiff, weak, or less flexible because of illness, medicines, or a physical condition.   Other health problems that make falls more likely include:     Arthritis    Dizziness or lightheadedness when you stand up (orthostatic hypotension)    History of a stroke    Dizziness    Anemia    Certain medicines taken for mental illness or to control blood pressure.    Problems with balance or gait    Bladder or urinary problems    History of falling    Changes in vision (vision impairment)    Changes in thinking skills and memory (cognitive impairment)  Injuries from a fall can include serious injuries such as broken bones, dislocated joints, internal bleeding and cuts. Injuries like these can limit your independence.   Prevention tips  To help prevent falls and fall-related injuries, follow the tips below.    Floors  To make floors safer:     Put nonskid pads under area rugs.    Remove small rugs.    Replace worn floor coverings.    Tack carpets firmly to each step on carpeted stairs. Put nonskid strips on the edges of uncarpeted stairs.    Keep floors and stairs free of clutter and cords.    Arrange furniture so there are clear pathways.    Clean up any spills right away.  Bathrooms    To make bathrooms safer:     Install grab bars in  the tub or shower.    Apply nonskid strips or put a nonskid rubber mat in the tub or shower.    Sit on a bath chair to bathe.    Use bathmats with nonskid backing.  Lighting  To improve visibility in your home:      Keep a flashlight in each room. Or put a lamp next to the bed within easy reach.    Put nightlights in the bedrooms, hallways, kitchen, and bathrooms.    Make sure all stairways have good lighting.    Take your time when going up and down stairs.    Put handrails on both sides of stairs and in walkways for more support. To prevent injury to your wrist or arm, don t use handrails to pull yourself up.    Install grab bars to pull yourself up.    Move or rearrange items that you use often. This will make them easier to find or reach.    Look at your home to find any safety hazards. Especially look at doorways, walkways, and the driveway. Remove or repair any safety problems that you find.  Other changes to make    Look around to find any safety hazards. Look closely at doorways, walkways, and the driveway. Remove or repair any safety problems that you find.    Wear shoes that fit well.    Take your time when going up and down stairs.    Put handrails on both sides of stairs and in walkways for more support. To prevent injury to your wrist or arm, don t use handrails to pull yourself up.    Install grab bars wherever needed to pull yourself up.    Arrange items that you use often. This will make them easier to find or reach.    [employer] last reviewed this educational content on 3/1/2020    7498-3078 [employer]. All rights reserved. This information is not intended as a substitute for professional medical care. Always follow your healthcare professional's instructions.           Patient Education   Personalized Prevention Plan  You are due for the preventive services outlined below.  Your care team is available to assist you in scheduling these services.  If you have already completed any of  these items, please share that information with your care team to update in your medical record.  Health Maintenance Due   Topic Date Due     Diabetic Foot Exam  Never done     ANNUAL REVIEW OF HM ORDERS  Never done     Zoster (Shingles) Vaccine (2 of 3) 05/18/2011     Basic Metabolic Panel  09/28/2021     Cholesterol Lab  09/28/2021     Kidney Microalbumin Urine Test  09/29/2021     A1C Lab  10/30/2021       Preventing Falls at Home  A person can fall for many reasons. Older adults may fall because reaction time slows down as we age. Your muscles and joints may get stiff, weak, or less flexible because of illness, medicines, or a physical condition.   Other health problems that make falls more likely include:     Arthritis    Dizziness or lightheadedness when you stand up (orthostatic hypotension)    History of a stroke    Dizziness    Anemia    Certain medicines taken for mental illness or to control blood pressure.    Problems with balance or gait    Bladder or urinary problems    History of falling    Changes in vision (vision impairment)    Changes in thinking skills and memory (cognitive impairment)  Injuries from a fall can include serious injuries such as broken bones, dislocated joints, internal bleeding and cuts. Injuries like these can limit your independence.   Prevention tips  To help prevent falls and fall-related injuries, follow the tips below.    Floors  To make floors safer:     Put nonskid pads under area rugs.    Remove small rugs.    Replace worn floor coverings.    Tack carpets firmly to each step on carpeted stairs. Put nonskid strips on the edges of uncarpeted stairs.    Keep floors and stairs free of clutter and cords.    Arrange furniture so there are clear pathways.    Clean up any spills right away.  Bathrooms    To make bathrooms safer:     Install grab bars in the tub or shower.    Apply nonskid strips or put a nonskid rubber mat in the tub or shower.    Sit on a bath chair to  bathe.    Use bathmats with nonskid backing.  Lighting  To improve visibility in your home:      Keep a flashlight in each room. Or put a lamp next to the bed within easy reach.    Put nightlights in the bedrooms, hallways, kitchen, and bathrooms.    Make sure all stairways have good lighting.    Take your time when going up and down stairs.    Put handrails on both sides of stairs and in walkways for more support. To prevent injury to your wrist or arm, don t use handrails to pull yourself up.    Install grab bars to pull yourself up.    Move or rearrange items that you use often. This will make them easier to find or reach.    Look at your home to find any safety hazards. Especially look at doorways, walkways, and the driveway. Remove or repair any safety problems that you find.  Other changes to make    Look around to find any safety hazards. Look closely at doorways, walkways, and the driveway. Remove or repair any safety problems that you find.    Wear shoes that fit well.    Take your time when going up and down stairs.    Put handrails on both sides of stairs and in walkways for more support. To prevent injury to your wrist or arm, don t use handrails to pull yourself up.    Install grab bars wherever needed to pull yourself up.    Arrange items that you use often. This will make them easier to find or reach.    [employer] last reviewed this educational content on 3/1/2020    9716-6241 [employer]. All rights reserved. This information is not intended as a substitute for professional medical care. Always follow your healthcare professional's instructions.

## 2022-03-23 NOTE — PROGRESS NOTES
"    She is at risk for falling and has been provided with information to reduce the risk of falling at home.  She is at risk for falling and has been provided with information to reduce the risk of falling at home.  Assessment & Plan     ICD-10-CM    1. Encounter for Medicare annual wellness exam  Z00.00        Follow Up/Next Steps    Return in about 53 weeks (around 3/29/2023) for Annual Wellness Visit.  Recommended that patient follow up with PCP to address the following : Patient is overdur for Zoster immunization, diabetic foot exam. Continue to follow up with PCP    Counseling and Education  Reviewed preventive health counseling, as reflected in patient instructions      BMI:   Estimated body mass index is 29.69 kg/m  as calculated from the following:    Height as of this encounter: 1.499 m (4' 11\").    Weight as of this encounter: 66.7 kg (147 lb).   Weight management plan: Discussed healthy diet and exercise guidelines      Appropriate preventive services were discussed with this patient, including applicable screening as appropriate for cardiovascular disease, diabetes, osteopenia/osteoporosis, and glaucoma.  As appropriate for age/gender, discussed screening for colorectal cancer, prostate cancer, breast cancer, and cervical cancer. Checklist reviewing preventive services available has been given to the patient.    Reviewed patients plan of care and provided an AVS. The Basic Care Plan (routine screening as documented in Health Maintenance) for Silvia meets the Care Plan requirement. This Care Plan has been established and reviewed with the Patient.    Visit Provider: Effie Allison RN  Supervising Provider: Kylah Dickey MD, MD  [facility]       Subjective   Silvia is a 86 year old who is being seen for an Annual Wellness Visit  accompanied by her none.    Healthy Habits:     In general, how would you rate your overall health?  Good    Frequency of exercise:  " "4-5 days/week    Duration of exercise:  15-30 minutes    Do you usually eat at least 4 servings of fruit and vegetables a day, include whole grains    & fiber and avoid regularly eating high fat or \"junk\" foods?  Yes    Taking medications regularly:  Yes    Medication side effects:  None    Ability to successfully perform activities of daily living:  No assistance needed    Home Safety:  Lack of grab bars in the bathroom    Hearing Impairment:  No hearing concerns    In the past 6 months, have you been bothered by leaking of urine?  No    In general, how would you rate your overall mental or emotional health?  Excellent      PHQ-2 Total Score: 0    Additional concerns today:  No    Do you feel safe in your environment? Yes    Have you ever done Advance Care Planning? (For example, a Health Directive, POLST, or a discussion with a medical provider or your loved ones about your wishes): Yes, patient states has an Advance Care Planning document and will bring a copy to the clinic.    Fall risk  Fallen 2 or more times in the past year?: Yes  Any fall with injury in the past year?: No  Timed Up and Go Test (>13.5 is fall risk; contact physician) : 8  Cognitive Screening   1) Repeat 3 items (Leader, Season, Table)    2) Clock draw: NORMAL  3) 3 item recall: Recalls 3 objects  Results: NORMAL clock, 1-2 items recalled: COGNITIVE IMPAIRMENT LESS LIKELY    [employer]TM Copyright [employer]. Licensed by the author for use in [facility]; reprinted with permission (chance@[facility]). All rights reserved.      Do you have sleep apnea, excessive snoring or daytime drowsiness?: no    Reviewed and updated as needed this visit by clinical staff   Tobacco  Allergies  Meds  Problems             Social History     Tobacco Use     Smoking status: Never Smoker     Smokeless tobacco: Never Used   Substance Use Topics     Alcohol use: Yes     Alcohol/week: 1.0 standard drink     Comment: Alcoholic Drinks/day: occasional glass of " "wine       Current providers sharing in care for this patient include:   Patient Care Team:  Chuy Avila MD as PCP - General  Swapna Infante, PharmD as Pharmacist (Pharmacist)  Chuy Avila MD as Assigned PCP  Nadia Atkins NP as Nurse Practitioner    The following health maintenance items are reviewed in Epic and correct as of today:  Health Maintenance Due   Topic Date Due     DIABETIC FOOT EXAM  Never done     ANNUAL REVIEW OF HM ORDERS  Never done     ZOSTER IMMUNIZATION (2 of 3) 05/18/2011     BMP  09/28/2021     LIPID  09/28/2021     MICROALBUMIN  09/29/2021     A1C  10/30/2021       Mammogram Screening - Mammography discussed and declined  Pertinent mammograms are reviewed under the imaging tab.        Objective    /67 (BP Location: Left arm, Patient Position: Sitting, Cuff Size: Adult Regular)   Pulse 96   Temp 97  F (36.1  C) (Temporal)   Resp 18   Ht 1.499 m (4' 11\")   Wt 66.7 kg (147 lb)   SpO2 98%   BMI 29.69 kg/m   Estimated body mass index is 29.69 kg/m  as calculated from the following:    Height as of this encounter: 1.499 m (4' 11\").    Weight as of this encounter: 66.7 kg (147 lb).  Physical Exam  Patient appears comfortable and in no acute distress.        Identified Health Risks:  She is at risk for falling and has been provided with information to reduce the risk of falling at home.  "

## 2022-05-26 DIAGNOSIS — E11.9 TYPE 2 DIABETES MELLITUS WITHOUT COMPLICATION, WITHOUT LONG-TERM CURRENT USE OF INSULIN (H): ICD-10-CM

## 2022-05-27 NOTE — TELEPHONE ENCOUNTER
"Routing refill request to provider for review/approval because:  Labs not current:  Multiple    Last Written Prescription Date:  5/6/21  Last Fill Quantity: 90,  # refills: 3   Last office visit provider:  9/3/21     Requested Prescriptions   Pending Prescriptions Disp Refills     pioglitazone (ACTOS) 15 MG tablet [Pharmacy Med Name: PIOGLITAZONE 15MG TABLETS] 90 tablet 3     Sig: TAKE 1 TABLET(15 MG) BY MOUTH DAILY       Thiazolidinedione Agents (TZDs)  Failed - 5/27/2022  1:44 PM        Failed - Patient has a normal ALT within the past 12 mos.     Recent Labs   Lab Test 09/28/20  0940   ALT 20             Failed - Patient has a normal AST within the past 12 mos.      Recent Labs   Lab Test 09/28/20  0940   AST 20             Failed - Patient has documented A1c within the specified period of time.     If HgbA1C is 8 or greater, it needs to be on file within the past 3 months.  If less than 8, must be on file within the past 6 months.     Recent Labs   Lab Test 04/30/21  1051   A1C 7.1*             Failed - Patient has a normal serum Creatinine in the past 12 months     Recent Labs   Lab Test 09/28/20  0940   CR 1.25*       Ok to refill medication if creatinine is low          Failed - Recent (6 mo) or future (30 days) visit within the authorizing provider's specialty     Patient had office visit in the last 6 months or has a visit in the next 30 days with authorizing provider or within the authorizing provider's specialty.  See \"Patient Info\" tab in inbasket, or \"Choose Columns\" in Meds & Orders section of the refill encounter.            Passed - Diagnosis not CHF        Passed - Medication is active on med list        Passed - Patient is age 18 or older        Passed - Patient is not pregnant        Passed - Patient has not had a positive pregnancy test within the past 12 mos.             Abraham Gilman RN 05/27/22 1:44 PM  "

## 2022-06-01 RX ORDER — PIOGLITAZONEHYDROCHLORIDE 15 MG/1
TABLET ORAL
Qty: 90 TABLET | Refills: 0 | Status: SHIPPED | OUTPATIENT
Start: 2022-06-01 | End: 2022-10-17

## 2022-07-16 ENCOUNTER — HEALTH MAINTENANCE LETTER (OUTPATIENT)
Age: 87
End: 2022-07-16

## 2022-08-15 ENCOUNTER — OFFICE VISIT (OUTPATIENT)
Dept: INTERNAL MEDICINE | Facility: CLINIC | Age: 87
End: 2022-08-15
Payer: COMMERCIAL

## 2022-08-15 VITALS
OXYGEN SATURATION: 98 % | HEIGHT: 59 IN | SYSTOLIC BLOOD PRESSURE: 122 MMHG | DIASTOLIC BLOOD PRESSURE: 62 MMHG | WEIGHT: 142 LBS | HEART RATE: 79 BPM | BODY MASS INDEX: 28.63 KG/M2

## 2022-08-15 DIAGNOSIS — F41.1 ANXIETY STATE: ICD-10-CM

## 2022-08-15 DIAGNOSIS — M85.80 OSTEOPENIA, UNSPECIFIED LOCATION: ICD-10-CM

## 2022-08-15 DIAGNOSIS — I10 ESSENTIAL HYPERTENSION: ICD-10-CM

## 2022-08-15 DIAGNOSIS — E78.2 MIXED HYPERLIPIDEMIA: ICD-10-CM

## 2022-08-15 DIAGNOSIS — Z00.00 ENCOUNTER FOR ROUTINE ADULT MEDICAL EXAMINATION: Primary | ICD-10-CM

## 2022-08-15 DIAGNOSIS — K21.9 GASTROESOPHAGEAL REFLUX DISEASE WITHOUT ESOPHAGITIS: ICD-10-CM

## 2022-08-15 DIAGNOSIS — E11.9 TYPE 2 DIABETES MELLITUS WITHOUT COMPLICATION, WITH LONG-TERM CURRENT USE OF INSULIN (H): ICD-10-CM

## 2022-08-15 DIAGNOSIS — Z79.4 TYPE 2 DIABETES MELLITUS WITHOUT COMPLICATION, WITH LONG-TERM CURRENT USE OF INSULIN (H): ICD-10-CM

## 2022-08-15 DIAGNOSIS — D47.2 IGA MONOCLONAL GAMMOPATHY OF UNCERTAIN SIGNIFICANCE: ICD-10-CM

## 2022-08-15 LAB
ALBUMIN SERPL BCG-MCNC: 4.3 G/DL (ref 3.5–5.2)
ALP SERPL-CCNC: 80 U/L (ref 35–104)
ALT SERPL W P-5'-P-CCNC: 21 U/L (ref 10–35)
ANION GAP SERPL CALCULATED.3IONS-SCNC: 11 MMOL/L (ref 7–15)
AST SERPL W P-5'-P-CCNC: 26 U/L (ref 10–35)
BILIRUB SERPL-MCNC: 0.5 MG/DL
BUN SERPL-MCNC: 25.6 MG/DL (ref 8–23)
CALCIUM SERPL-MCNC: 10.2 MG/DL (ref 8.8–10.2)
CHLORIDE SERPL-SCNC: 102 MMOL/L (ref 98–107)
CHOLEST SERPL-MCNC: 170 MG/DL
CREAT SERPL-MCNC: 1.3 MG/DL (ref 0.51–0.95)
DEPRECATED HCO3 PLAS-SCNC: 27 MMOL/L (ref 22–29)
ERYTHROCYTE [DISTWIDTH] IN BLOOD BY AUTOMATED COUNT: 12.9 % (ref 10–15)
GFR SERPL CREATININE-BSD FRML MDRD: 40 ML/MIN/1.73M2
GLUCOSE SERPL-MCNC: 132 MG/DL (ref 70–99)
HBA1C MFR BLD: 7 % (ref 0–5.6)
HCT VFR BLD AUTO: 31.3 % (ref 35–47)
HDLC SERPL-MCNC: 62 MG/DL
HGB BLD-MCNC: 9.8 G/DL (ref 11.7–15.7)
LDLC SERPL CALC-MCNC: 86 MG/DL
MCH RBC QN AUTO: 31.4 PG (ref 26.5–33)
MCHC RBC AUTO-ENTMCNC: 31.3 G/DL (ref 31.5–36.5)
MCV RBC AUTO: 100 FL (ref 78–100)
NONHDLC SERPL-MCNC: 108 MG/DL
PLATELET # BLD AUTO: 235 10E3/UL (ref 150–450)
POTASSIUM SERPL-SCNC: 4.7 MMOL/L (ref 3.4–5.3)
PROT SERPL-MCNC: 7.3 G/DL (ref 6.4–8.3)
RBC # BLD AUTO: 3.12 10E6/UL (ref 3.8–5.2)
SODIUM SERPL-SCNC: 140 MMOL/L (ref 136–145)
TRIGL SERPL-MCNC: 112 MG/DL
WBC # BLD AUTO: 5.6 10E3/UL (ref 4–11)

## 2022-08-15 PROCEDURE — 85027 COMPLETE CBC AUTOMATED: CPT | Performed by: INTERNAL MEDICINE

## 2022-08-15 PROCEDURE — G0439 PPPS, SUBSEQ VISIT: HCPCS | Performed by: INTERNAL MEDICINE

## 2022-08-15 PROCEDURE — 80053 COMPREHEN METABOLIC PANEL: CPT | Performed by: INTERNAL MEDICINE

## 2022-08-15 PROCEDURE — 99214 OFFICE O/P EST MOD 30 MIN: CPT | Mod: 25 | Performed by: INTERNAL MEDICINE

## 2022-08-15 PROCEDURE — 80061 LIPID PANEL: CPT | Performed by: INTERNAL MEDICINE

## 2022-08-15 PROCEDURE — 83036 HEMOGLOBIN GLYCOSYLATED A1C: CPT | Performed by: INTERNAL MEDICINE

## 2022-08-15 PROCEDURE — 36415 COLL VENOUS BLD VENIPUNCTURE: CPT | Performed by: INTERNAL MEDICINE

## 2022-08-15 ASSESSMENT — ENCOUNTER SYMPTOMS
NAUSEA: 0
ABDOMINAL PAIN: 0
DYSURIA: 0
COUGH: 0
DIARRHEA: 1
SHORTNESS OF BREATH: 0
NERVOUS/ANXIOUS: 0
WEAKNESS: 0
PALPITATIONS: 0
PARESTHESIAS: 0
EYE PAIN: 0
HEARTBURN: 0
DIZZINESS: 0
CONSTIPATION: 0
FEVER: 0
FREQUENCY: 0
HEMATURIA: 0
JOINT SWELLING: 0
CHILLS: 0
ARTHRALGIAS: 1
BREAST MASS: 0
SORE THROAT: 0
HEMATOCHEZIA: 0
HEADACHES: 0
MYALGIAS: 1

## 2022-08-15 ASSESSMENT — ACTIVITIES OF DAILY LIVING (ADL): CURRENT_FUNCTION: TRANSPORTATION REQUIRES ASSISTANCE

## 2022-08-15 NOTE — PROGRESS NOTES
"SUBJECTIVE:   Silvia Avila is a 87 year old female who presents for Preventive Visit.    ILLNESSES, HOSPITALIZATIONS, AND OPERATIONS:     #1.  History of breast cancer status post lumpectomy, radiation, and chemotherapy in 1997.     #2.  Hypertension, well controlled on lisinopril monotherapy     3.  History of MGUS.     4.  Hyperlipidemia, currently on 40 mg of atorvastatin.     5.  History of osteopenia.  On Prolia.  Currently cared for by ARSALAN Atkins in endocrinology.     6.  Anxiety, on Paxil.  Well-controlled.     7.  Type 2 diabetes, diet controlled.  Due for all labs.     8.  Status post bunionectomy.     9.  Status post hemorrhoid banding.         Patient has been advised of split billing requirements and indicates understanding: Yes  Are you in the first 12 months of your Medicare coverage?  No    Healthy Habits:     In general, how would you rate your overall health?  Good    Frequency of exercise:  1 day/week    Duration of exercise:  15-30 minutes    Do you usually eat at least 4 servings of fruit and vegetables a day, include whole grains    & fiber and avoid regularly eating high fat or \"junk\" foods?  No    Taking medications regularly:  Yes    Medication side effects:  None    Ability to successfully perform activities of daily living:  Transportation requires assistance    Home Safety:  No safety concerns identified    Hearing Impairment:  Difficulty understanding soft or whispered speech    In the past 6 months, have you been bothered by leaking of urine? Yes    In general, how would you rate your overall mental or emotional health?  Excellent      PHQ-2 Total Score: 0    Additional concerns today:  No    Do you feel safe in your environment? Yes    Have you ever done Advance Care Planning? (For example, a Health Directive, POLST, or a discussion with a medical provider or your loved ones about your wishes): Yes, advance care planning is on file.      Fall risk  Fallen 2 or more times in the past " year?: No  Any fall with injury in the past year?: No  click delete button to remove this line now  Cognitive Screening   1) Repeat 3 items (Leader, Season, Table)    2) Clock draw: NORMAL  3) 3 item recall: Recalls 2 objects   Results: NORMAL clock, 1-2 items recalled: COGNITIVE IMPAIRMENT LESS LIKELY    Mini-CogTM Copyright JASMINE Nix. Licensed by the author for use in [facility]; reprinted with permission (chance@[facility]). All rights reserved.      Do you have sleep apnea, excessive snoring or daytime drowsiness?: no    Reviewed and updated as needed this visit by clinical staff    Allergies  Meds                Reviewed and updated as needed this visit by Provider                   Social History     Tobacco Use     Smoking status: Never Smoker     Smokeless tobacco: Never Used   Substance Use Topics     Alcohol use: Yes     Alcohol/week: 1.0 standard drink     Comment: Alcoholic Drinks/day: occasional glass of wine     If you drink alcohol do you typically have >3 drinks per day or >7 drinks per week? No    Alcohol Use 8/15/2022   Prescreen: >3 drinks/day or >7 drinks/week? No           Current providers sharing in care for this patient include:   Patient Care Team:  Chuy Avila MD as PCP - Swapna Andrews, PharmD as Pharmacist (Pharmacist)  Chuy Avila MD as Assigned PCP  Nadia Atkins NP as Nurse Practitioner    The following health maintenance items are reviewed in Epic and correct as of today:  Health Maintenance Due   Topic Date Due     DIABETIC FOOT EXAM  Never done     ANNUAL REVIEW OF  ORDERS  Never done     ZOSTER IMMUNIZATION (2 of 3) 05/18/2011     BMP  09/28/2021     LIPID  09/28/2021     MICROALBUMIN  09/29/2021     A1C  10/30/2021     COVID-19 Vaccine (4 - Booster for Pfizer series) 02/19/2022           Review of Systems   Constitutional: Negative for chills and fever.   HENT: Negative for congestion, ear pain, hearing loss and sore throat.    Eyes: Negative for pain  "and visual disturbance.   Respiratory: Negative for cough and shortness of breath.    Cardiovascular: Negative for chest pain, palpitations and peripheral edema.   Gastrointestinal: Positive for diarrhea. Negative for abdominal pain, constipation, heartburn, hematochezia and nausea.   Breasts:  Negative for tenderness and breast mass.   Genitourinary: Negative for dysuria, frequency, genital sores, hematuria, pelvic pain, urgency and vaginal discharge.   Musculoskeletal: Positive for arthralgias and myalgias. Negative for joint swelling.   Skin: Negative for rash.   Neurological: Negative for dizziness, weakness, headaches and paresthesias.   Psychiatric/Behavioral: Negative for mood changes. The patient is not nervous/anxious.      {ROS    OBJECTIVE:   There were no vitals taken for this visit. Estimated body mass index is 29.69 kg/m  as calculated from the following:    Height as of 3/23/22: 1.499 m (4' 11\").    Weight as of 3/23/22: 66.7 kg (147 lb).  Physical Exam  GENERAL APPEARANCE: healthy, alert and no distress  EYES: Eyes grossly normal to inspection, PERRL and conjunctivae and sclerae normal  HENT: ear canals and TM's normal, nose and mouth without ulcers or lesions, oropharynx clear and oral mucous membranes moist  NECK: no adenopathy, no asymmetry, masses, or scars and thyroid normal to palpation  RESP: lungs clear to auscultation - no rales, rhonchi or wheezes  CV: regular rate and rhythm, normal S1 S2, no S3 or S4, no murmur, click or rub, no peripheral edema and peripheral pulses strong  ABDOMEN: soft, nontender, no hepatosplenomegaly, no masses and bowel sounds normal  MS: no musculoskeletal defects are noted and gait is age appropriate without ataxia  SKIN: no suspicious lesions or rashes  NEURO: Normal strength and tone, sensory exam grossly normal, mentation intact and speech normal  PSYCH: mentation appears normal and affect normal/bright    Diagnostic Test Results:  Labs reviewed in " "Epic    ASSESSMENT / PLAN:   (Z00.00) Encounter for routine adult medical examination  (primary encounter diagnosis)  Comment: Vaccinations are up-to-date.  Check labs as below.  No need for breast or colon cancer screening at her advanced age.  Follow-up 1 year, earlier if needed.  Plan:     (M85.80) Osteopenia, unspecified location  Comment:   Plan: Continue follow-up with endocrinology.    (F41.1) Anxiety  Comment: Stable.  Continue Paxil.  Plan:     (K21.9) Gastroesophageal reflux disease without esophagitis  Comment: Stable.  Continue omeprazole.  Plan:     (I10) Hypertension  Comment: Stable.  Check labs as below.  Plan: Comprehensive metabolic panel (BMP + Alb, Alk         Phos, ALT, AST, Total. Bili, TP)            (E78.2) Mixed hyperlipidemia  Comment: Stable.  Continue a atorvastatin.  Labs as below.  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  Plan: CBC with platelets        Estimated body mass index is 29.69 kg/m  as calculated from the following:    Height as of 3/23/22: 1.499 m (4' 11\").    Weight as of 3/23/22: 66.7 kg (147 lb).        She reports that she has never smoked. She has never used smokeless tobacco.      Appropriate preventive services were discussed with this patient, including applicable screening as appropriate for cardiovascular disease, diabetes, osteopenia/osteoporosis, and glaucoma.  As appropriate for age/gender, discussed screening for colorectal cancer, prostate cancer, breast cancer, and cervical cancer. Checklist reviewing preventive services available has been given to the patient.      Counseling Resources:  ATP IV Guidelines  Pooled Cohorts Equation Calculator  Breast Cancer Risk Calculator  Breast Cancer: Medication to Reduce " Risk  FRAX Risk Assessment  ICSI Preventive Guidelines  Dietary Guidelines for Americans, 2010  USDA's MyPlate  ASA Prophylaxis  Lung CA Screening    Chuy Avila MD  [facility]    Identified Health Risks:

## 2022-09-08 DIAGNOSIS — F41.1 ANXIETY STATE: ICD-10-CM

## 2022-09-09 RX ORDER — PAROXETINE 20 MG/1
20 TABLET, FILM COATED ORAL DAILY
Qty: 90 TABLET | Refills: 3 | Status: SHIPPED | OUTPATIENT
Start: 2022-09-09

## 2022-09-09 NOTE — TELEPHONE ENCOUNTER
"Last Written Prescription Date:  3/12/22  Last Fill Quantity: 90,  # refills: 1   Last office visit provider:  8/15/22     Requested Prescriptions   Pending Prescriptions Disp Refills     PARoxetine (PAXIL) 20 MG tablet [Pharmacy Med Name: PAROXETINE 20MG TABLETS] 90 tablet 1     Sig: TAKE 1 TABLET(20 MG) BY MOUTH DAILY       SSRIs Protocol Passed - 9/9/2022  7:51 AM        Passed - Recent (12 mo) or future (30 days) visit within the authorizing provider's specialty     Patient has had an office visit with the authorizing provider or a provider within the authorizing providers department within the previous 12 mos or has a future within next 30 days. See \"Patient Info\" tab in inbasket, or \"Choose Columns\" in Meds & Orders section of the refill encounter.              Passed - Medication is active on med list        Passed - Patient is age 18 or older        Passed - No active pregnancy on record        Passed - No positive pregnancy test in last 12 months             Abraham Gilman RN 09/09/22 7:52 AM  "

## 2022-09-18 ENCOUNTER — HEALTH MAINTENANCE LETTER (OUTPATIENT)
Age: 87
End: 2022-09-18

## 2022-09-29 ENCOUNTER — TELEPHONE (OUTPATIENT)
Dept: INTERNAL MEDICINE | Facility: CLINIC | Age: 87
End: 2022-09-29

## 2022-09-29 NOTE — TELEPHONE ENCOUNTER
Informed daughter Payton of info and she verbalized understanding.    Gabby Campos, KARSONN, RN  [facility]

## 2022-09-29 NOTE — TELEPHONE ENCOUNTER
Call received from dtr, updating on pt. 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 An MRI was done this week. Dtr is asking about pt's HGB 9.8 result on 8/15/22 and if Dr. Avila recommends ferrous sulfate supplement and if f/u Hgb should be rechecked due to history of anemia. Pt's B12 was 293 and has begun taking a supplement under recommendation of the neurologist. Dtr will see if pt has a healthcare directive completed and if not, will have completed.

## 2022-09-29 NOTE — TELEPHONE ENCOUNTER
I would advise her daughter to contact her hematologist, Dr. Pat Scott at [facility], in regards to this question

## 2022-10-14 DIAGNOSIS — E11.9 TYPE 2 DIABETES MELLITUS WITHOUT COMPLICATION, WITHOUT LONG-TERM CURRENT USE OF INSULIN (H): ICD-10-CM

## 2022-10-15 NOTE — TELEPHONE ENCOUNTER
"Routing refill request to provider for review/approval because:  Labs out of range:  cr    Last Written Prescription Date:  6/1/22  Last Fill Quantity: 90,  # refills: 0   Last office visit provider:  8/15/22     Requested Prescriptions   Pending Prescriptions Disp Refills     pioglitazone (ACTOS) 15 MG tablet [Pharmacy Med Name: PIOGLITAZONE 15MG TABLETS] 90 tablet 0     Sig: TAKE 1 TABLET(15 MG) BY MOUTH DAILY       Thiazolidinedione Agents (TZDs)  Failed - 10/14/2022  3:07 PM        Failed - Patient has a normal serum Creatinine in the past 12 months     Recent Labs   Lab Test 08/15/22  1011   CR 1.30*       Ok to refill medication if creatinine is low          Passed - Patient has a normal ALT within the past 12 mos.     Recent Labs   Lab Test 08/15/22  1011   ALT 21             Passed - Patient has a normal AST within the past 12 mos.      Recent Labs   Lab Test 08/15/22  1011   AST 26             Passed - Patient has documented A1c within the specified period of time.     If HgbA1C is 8 or greater, it needs to be on file within the past 3 months.  If less than 8, must be on file within the past 6 months.     Recent Labs   Lab Test 08/15/22  1011   A1C 7.0*             Passed - Diagnosis not CHF        Passed - Medication is active on med list        Passed - Patient is age 18 or older        Passed - Patient is not pregnant        Passed - Patient has not had a positive pregnancy test within the past 12 mos.        Passed - Recent (6 mo) or future (30 days) visit within the authorizing provider's specialty     Patient had office visit in the last 6 months or has a visit in the next 30 days with authorizing provider or within the authorizing provider's specialty.  See \"Patient Info\" tab in inbasket, or \"Choose Columns\" in Meds & Orders section of the refill encounter.                 [place], Deepali, RN 10/15/22 12:05 PM  "

## 2022-10-17 RX ORDER — PIOGLITAZONEHYDROCHLORIDE 15 MG/1
TABLET ORAL
Qty: 90 TABLET | Refills: 0 | Status: SHIPPED | OUTPATIENT
Start: 2022-10-17 | End: 2023-02-06

## 2022-12-07 DIAGNOSIS — K21.9 ESOPHAGEAL REFLUX: ICD-10-CM

## 2022-12-07 DIAGNOSIS — E78.5 HYPERLIPIDEMIA: ICD-10-CM

## 2022-12-07 RX ORDER — ATORVASTATIN CALCIUM 40 MG/1
TABLET, FILM COATED ORAL
Qty: 90 TABLET | Refills: 2 | Status: SHIPPED | OUTPATIENT
Start: 2022-12-07 | End: 2023-02-22

## 2022-12-08 NOTE — TELEPHONE ENCOUNTER
"Last Written Prescription Date:  12/14/21  Last Fill Quantity: 90,  # refills: 3   Last office visit provider:  8/15/22     Requested Prescriptions   Pending Prescriptions Disp Refills     atorvastatin (LIPITOR) 40 MG tablet [Pharmacy Med Name: ATORVASTATIN 40MG TABLETS] 90 tablet 3     Sig: TAKE 1 TABLET(40 MG) BY MOUTH AT BEDTIME       Statins Protocol Passed - 12/7/2022  5:58 AM        Passed - LDL on file in past 12 months     Recent Labs   Lab Test 08/15/22  1011   LDL 86             Passed - No abnormal creatine kinase in past 12 months     No lab results found.             Passed - Recent (12 mo) or future (30 days) visit within the authorizing provider's specialty     Patient has had an office visit with the authorizing provider or a provider within the authorizing providers department within the previous 12 mos or has a future within next 30 days. See \"Patient Info\" tab in inbasket, or \"Choose Columns\" in Meds & Orders section of the refill encounter.              Passed - Medication is active on med list        Passed - Patient is age 18 or older        Passed - No active pregnancy on record        Passed - No positive pregnancy test in past 12 months           omeprazole (PRILOSEC) 20 MG DR capsule [Pharmacy Med Name: OMEPRAZOLE 20MG CAPSULES] 90 capsule 3     Sig: TAKE 1 CAPSULE(20 MG) BY MOUTH DAILY BEFORE AND BREAKFAST       PPI Protocol Passed - 12/7/2022  5:58 AM        Passed - Not on Clopidogrel (unless Pantoprazole ordered)        Passed - No diagnosis of osteoporosis on record        Passed - Recent (12 mo) or future (30 days) visit within the authorizing provider's specialty     Patient has had an office visit with the authorizing provider or a provider within the authorizing providers department within the previous 12 mos or has a future within next 30 days. See \"Patient Info\" tab in inbasket, or \"Choose Columns\" in Meds & Orders section of the refill encounter.              Passed - Medication " is active on med list        Passed - Patient is age 18 or older        Passed - No active pregnacy on record        Passed - No positive pregnancy test in past 12 months             Deepali Zaragoza, RN 12/07/22 8:51 PM

## 2022-12-11 ENCOUNTER — NURSE TRIAGE (OUTPATIENT)
Dept: NURSING | Facility: CLINIC | Age: 87
End: 2022-12-11

## 2022-12-11 NOTE — TELEPHONE ENCOUNTER
Triage Call;     Pt Calling to report that she tested positive for COVID-19 positive last night  Developed sx on Wednesday, 12/7/2022    Went to [facility] and they gave patient FNA's number to call.     Sx:   Cough  Runny nose  Dry throat  Headache with    O2: 95% RA     No fever, No SOB, no chest pain    Taking:   Advil  Tussin     Disposition: Virtual appt. Pt was given the care advice for her sx and was able to make a virtual appt for tomorrow with a provider.     Margot Elias RN  [facility] Nurse Advisor 10:23 AM 12/11/2022      Reason for Disposition    HIGH RISK for severe COVID complications (e.g., weak immune system, age > 64 years, obesity with BMI > 25, pregnant, chronic lung disease or other chronic medical condition)  (Exception: Already seen by PCP and no new or worsening symptoms.)    Additional Information    Negative: SEVERE difficulty breathing (e.g., struggling for each breath, speaks in single words)    Negative: Difficult to awaken or acting confused (e.g., disoriented, slurred speech)    Negative: Bluish (or gray) lips or face now    Negative: Shock suspected (e.g., cold/pale/clammy skin, too weak to stand, low BP, rapid pulse)    Negative: Sounds like a life-threatening emergency to the triager    Negative: [1] Diagnosed or suspected COVID-19 AND [2] symptoms lasting 3 or more weeks    Negative: [1] COVID-19 exposure AND [2] no symptoms    Negative: COVID-19 vaccine reaction suspected (e.g., fever, headache, muscle aches) occurring 1 to 3 days after getting vaccine    Negative: COVID-19 vaccine, questions about    Negative: [1] Lives with someone known to have influenza (flu test positive) AND [2] flu-like symptoms (e.g., cough, runny nose, sore throat, SOB; with or without fever)    Negative: [1] Adult with possible COVID-19 symptoms AND [2] triager concerned about severity of symptoms or other causes    Negative: COVID-19 and breastfeeding, questions about    Negative:  SEVERE or constant chest pain or pressure  (Exception: Mild central chest pain, present only when coughing.)    Negative: MODERATE difficulty breathing (e.g., speaks in phrases, SOB even at rest, pulse 100-120)    Negative: [1] Headache AND [2] stiff neck (can't touch chin to chest)    Negative: Oxygen level (e.g., pulse oximetry) 90 percent or lower    Negative: Chest pain or pressure    Negative: Patient sounds very sick or weak to the triager    Negative: MILD difficulty breathing (e.g., minimal/no SOB at rest, SOB with walking, pulse <100)    Negative: Fever > 103 F (39.4 C)    Negative: [1] Fever > 101 F (38.3 C) AND [2] age > 60 years    Negative: [1] Fever > 100.0 F (37.8 C) AND [2] bedridden (e.g., nursing home patient, CVA, chronic illness, recovering from surgery)    Protocols used: CORONAVIRUS (COVID-19) DIAGNOSED OR [id number] 1.18.2022

## 2022-12-12 ENCOUNTER — VIRTUAL VISIT (OUTPATIENT)
Dept: FAMILY MEDICINE | Facility: CLINIC | Age: 87
End: 2022-12-12
Payer: COMMERCIAL

## 2022-12-12 DIAGNOSIS — U07.1 INFECTION DUE TO 2019 NOVEL CORONAVIRUS: Primary | ICD-10-CM

## 2022-12-12 PROCEDURE — 99213 OFFICE O/P EST LOW 20 MIN: CPT | Mod: CS | Performed by: NURSE PRACTITIONER

## 2022-12-12 NOTE — PATIENT INSTRUCTIONS
Start Paxlovid 2 pills twice a day for 5 days     Do not take your Lipitor for 8 days (5 days of treatment and 3 days after)

## 2022-12-12 NOTE — PROGRESS NOTES
"Silvia is a 87 year old who is being evaluated via a billable telephone visit.      What phone number would you like to be contacted at? [phone]  How would you like to obtain your AVS? [facility]    Assessment & Plan   Problem List Items Addressed This Visit    None  Visit Diagnoses     Infection due to 2019 novel coronavirus    -  Primary    Relevant Medications    nirmatrelvir and ritonavir (PAXLOVID) therapy pack             COVID-19 positive patient.  Encounter for consideration of medication intervention. Patient does qualify for a prescription. Full discussion with patient including medication options, risks and benefits. Potential drug interactions reviewed with patient.     Treatment Planned paxlovid sent    Temporary change to home medications: hold lipitor 8 days     Estimated body mass index is 28.68 kg/m  as calculated from the following:    Height as of 8/15/22: 1.499 m (4' 11\").    Weight as of 8/15/22: 64.4 kg (142 lb).  GFR Estimate   Date Value Ref Range Status   08/15/2022 40 (L) >60 mL/min/1.73m2 Final     Comment:     Effective December 21, 2021 eGFRcr in adults is calculated using the 2021 CKD-EPI creatinine equation which includes age and gender (Jennifer et al., NEJM, DOI: 10.1056/IVTYoz1829518)   09/28/2020 41 (L) >60 mL/min/1.73m2 Final     Lab Results   Component Value Date    [id number] Negative 08/05/2021       No follow-ups on file.    NAV Ojeda CNP  [facility]    Massimo Vega is a 87 year old, presenting for the following health issues:  No chief complaint on file.      HPI       COVID-19 Symptom Review  How many days ago did these symptoms start? 4 days ago    Are any of the following symptoms significant for you?    New or worsening difficulty breathing? No    Worsening cough? Yes, it's a dry cough.     Fever or chills? No    Headache: YES    Sore throat: No    Chest pain: No    Diarrhea: YES    Body aches? YES    What treatments has patient " tried? Nonsteroidals   Does patient live in a nursing home, group home, or shelter? No  Does patient have a way to get food/medications during quarantined? Yes, I have a friend or family member who can help me.    About 4 days of symptoms  No improvement today         Review of Systems   Detailed as above         Objective           Vitals:  No vitals were obtained today due to virtual visit.    Physical Exam   healthy, alert and no distress  PSYCH: Alert and oriented times 3; coherent speech, normal   rate and volume, able to articulate logical thoughts, able   to abstract reason, no tangential thoughts, no hallucinations   or delusions  Her affect is normal  RESP: No cough, no audible wheezing, able to talk in full sentences  Remainder of exam unable to be completed due to telephone visits            Phone call duration: 7 minutes

## 2023-01-05 ENCOUNTER — TRANSFERRED RECORDS (OUTPATIENT)
Dept: HEALTH INFORMATION MANAGEMENT | Facility: CLINIC | Age: 88
End: 2023-01-05

## 2023-02-04 DIAGNOSIS — I10 ESSENTIAL HYPERTENSION, MALIGNANT: ICD-10-CM

## 2023-02-04 DIAGNOSIS — E78.5 HYPERLIPIDEMIA: ICD-10-CM

## 2023-02-04 DIAGNOSIS — E11.9 TYPE 2 DIABETES MELLITUS WITHOUT COMPLICATION, WITHOUT LONG-TERM CURRENT USE OF INSULIN (H): ICD-10-CM

## 2023-02-06 RX ORDER — ATORVASTATIN CALCIUM 40 MG/1
TABLET, FILM COATED ORAL
Qty: 90 TABLET | Refills: 2 | OUTPATIENT
Start: 2023-02-06

## 2023-02-06 RX ORDER — LISINOPRIL 10 MG/1
10 TABLET ORAL DAILY
Qty: 90 TABLET | Refills: 2 | Status: SHIPPED | OUTPATIENT
Start: 2023-02-06

## 2023-02-06 RX ORDER — PIOGLITAZONEHYDROCHLORIDE 15 MG/1
TABLET ORAL
Qty: 90 TABLET | Refills: 0 | Status: SHIPPED | OUTPATIENT
Start: 2023-02-06 | End: 2023-06-18

## 2023-02-06 NOTE — TELEPHONE ENCOUNTER
"Routing refill request to provider for review/approval because:  Labs out of range:  Creatinine  Early refill requested.    Last Written Prescription Date:  3/9/22  Last Fill Quantity: 90,  # refills: 3   Last office visit provider:  8/15/22     Last Written Prescription Date:  10/17/22  Last Fill Quantity: 90,  # refills: 0   Last office visit provider:  8/15/22    Requested Prescriptions   Pending Prescriptions Disp Refills     lisinopril (ZESTRIL) 10 MG tablet [Pharmacy Med Name: LISINOPRIL 10MG TABLETS] 90 tablet 3     Sig: TAKE 1 TABLET(10 MG) BY MOUTH DAILY       ACE Inhibitors (Including Combos) Protocol Failed - 2/4/2023  4:27 PM        Failed - Normal serum creatinine on file in past 12 months     Recent Labs   Lab Test 08/15/22  1011   CR 1.30*       Ok to refill medication if creatinine is low          Passed - Blood pressure under 140/90 in past 12 months     BP Readings from Last 3 Encounters:   08/15/22 122/62   03/23/22 114/67   09/03/21 112/64                 Passed - Recent (12 mo) or future (30 days) visit within the authorizing provider's specialty     Patient has had an office visit with the authorizing provider or a provider within the authorizing providers department within the previous 12 mos or has a future within next 30 days. See \"Patient Info\" tab in inbasket, or \"Choose Columns\" in Meds & Orders section of the refill encounter.              Passed - Medication is active on med list        Passed - Patient is age 18 or older        Passed - No active pregnancy on record        Passed - Normal serum potassium on file in past 12 months     Recent Labs   Lab Test 08/15/22  1011   POTASSIUM 4.7             Passed - No positive pregnancy test within past 12 months           pioglitazone (ACTOS) 15 MG tablet [Pharmacy Med Name: PIOGLITAZONE 15MG TABLETS] 90 tablet 0     Sig: TAKE 1 TABLET(15 MG) BY MOUTH DAILY       Thiazolidinedione Agents (TZDs)  Failed - 2/4/2023  4:27 PM        Failed - " "Patient has a normal serum Creatinine in the past 12 months     Recent Labs   Lab Test 08/15/22  1011   CR 1.30*       Ok to refill medication if creatinine is low          Passed - Patient has a normal ALT within the past 12 mos.     Recent Labs   Lab Test 08/15/22  1011   ALT 21             Passed - Patient has a normal AST within the past 12 mos.      Recent Labs   Lab Test 08/15/22  1011   AST 26             Passed - Patient has documented A1c within the specified period of time.     If HgbA1C is 8 or greater, it needs to be on file within the past 3 months.  If less than 8, must be on file within the past 6 months.     Recent Labs   Lab Test 08/15/22  1011   A1C 7.0*             Passed - Diagnosis not CHF        Passed - Medication is active on med list        Passed - Patient is age 18 or older        Passed - Patient is not pregnant        Passed - Patient has not had a positive pregnancy test within the past 12 mos.        Passed - Recent (6 mo) or future (30 days) visit within the authorizing provider's specialty     Patient had office visit in the last 6 months or has a visit in the next 30 days with authorizing provider or within the authorizing provider's specialty.  See \"Patient Info\" tab in inbasket, or \"Choose Columns\" in Meds & Orders section of the refill encounter.             Refused Prescriptions Disp Refills     atorvastatin (LIPITOR) 40 MG tablet [Pharmacy Med Name: ATORVASTATIN 40MG TABLETS] 90 tablet 2     Sig: TAKE 1 TABLET(40 MG) BY MOUTH AT BEDTIME       Statins Protocol Passed - 2/6/2023 12:21 PM        Passed - LDL on file in past 12 months     Recent Labs   Lab Test 08/15/22  1011   LDL 86             Passed - No abnormal creatine kinase in past 12 months     No lab results found.             Passed - Recent (12 mo) or future (30 days) visit within the authorizing provider's specialty     Patient has had an office visit with the authorizing provider or a provider within the authorizing " "providers department within the previous 12 mos or has a future within next 30 days. See \"Patient Info\" tab in inbasket, or \"Choose Columns\" in Meds & Orders section of the refill encounter.              Passed - Medication is active on med list        Passed - Patient is age 18 or older        Passed - No active pregnancy on record        Passed - No positive pregnancy test in past 12 months             Abraham Gilman RN 02/06/23 12:22 PM  "

## 2023-02-10 ENCOUNTER — TRANSFERRED RECORDS (OUTPATIENT)
Dept: HEALTH INFORMATION MANAGEMENT | Facility: CLINIC | Age: 88
End: 2023-02-10

## 2023-02-10 LAB — RETINOPATHY: POSITIVE

## 2023-02-20 DIAGNOSIS — E78.5 HYPERLIPIDEMIA: ICD-10-CM

## 2023-02-20 RX ORDER — ATORVASTATIN CALCIUM 40 MG/1
TABLET, FILM COATED ORAL
Qty: 90 TABLET | Refills: 2 | OUTPATIENT
Start: 2023-02-20

## 2023-02-20 NOTE — TELEPHONE ENCOUNTER
"Last Written Prescription Date:  12/7/22  Last Fill Quantity: 90,  # refills: 2   Last office visit provider:  12/12/22    Requested Prescriptions   Pending Prescriptions Disp Refills     atorvastatin (LIPITOR) 40 MG tablet [Pharmacy Med Name: ATORVASTATIN 40MG TABLETS] 90 tablet 2     Sig: TAKE 1 TABLET(40 MG) BY MOUTH AT BEDTIME       Statins Protocol Passed - 2/20/2023  1:47 PM        Passed - LDL on file in past 12 months     Recent Labs   Lab Test 08/15/22  1011   LDL 86             Passed - No abnormal creatine kinase in past 12 months     No lab results found.             Passed - Recent (12 mo) or future (30 days) visit within the authorizing provider's specialty     Patient has had an office visit with the authorizing provider or a provider within the authorizing providers department within the previous 12 mos or has a future within next 30 days. See \"Patient Info\" tab in inbasket, or \"Choose Columns\" in Meds & Orders section of the refill encounter.              Passed - Medication is active on med list        Passed - Patient is age 18 or older        Passed - No active pregnancy on record        Passed - No positive pregnancy test in past 12 months             Lisa Espinal RN 02/20/23 1:48 PM  "

## 2023-02-21 DIAGNOSIS — E78.5 HYPERLIPIDEMIA: ICD-10-CM

## 2023-02-22 RX ORDER — ATORVASTATIN CALCIUM 40 MG/1
TABLET, FILM COATED ORAL
Qty: 90 TABLET | Refills: 1 | Status: SHIPPED | OUTPATIENT
Start: 2023-02-22

## 2023-05-07 ENCOUNTER — HEALTH MAINTENANCE LETTER (OUTPATIENT)
Age: 88
End: 2023-05-07

## 2023-06-18 DIAGNOSIS — E11.9 TYPE 2 DIABETES MELLITUS WITHOUT COMPLICATION, WITHOUT LONG-TERM CURRENT USE OF INSULIN (H): ICD-10-CM

## 2023-06-18 RX ORDER — PIOGLITAZONEHYDROCHLORIDE 15 MG/1
TABLET ORAL
Qty: 90 TABLET | Refills: 0 | Status: SHIPPED | OUTPATIENT
Start: 2023-06-18

## 2023-06-18 NOTE — TELEPHONE ENCOUNTER
"Routing refill request to provider for review/approval because:  Labs not current:  A1C, Cr  Patient needs to be seen because it has been more than 6 months since last office visit.    Last Written Prescription Date:  02/06/2023  Last Fill Quantity: 90,  # refills: 0   Last office visit provider:  08/15/2022     Requested Prescriptions   Pending Prescriptions Disp Refills     pioglitazone (ACTOS) 15 MG tablet [Pharmacy Med Name: PIOGLITAZONE 15MG TABLETS] 90 tablet 0     Sig: TAKE 1 TABLET(15 MG) BY MOUTH DAILY       Thiazolidinedione Agents (TZDs)  Failed - 6/18/2023 12:33 PM        Failed - Patient has documented A1c within the specified period of time.     If HgbA1C is 8 or greater, it needs to be on file within the past 3 months.  If less than 8, must be on file within the past 6 months.     Recent Labs   Lab Test 08/15/22  1011   A1C 7.0*             Failed - Patient has a normal serum Creatinine in the past 12 months     Recent Labs   Lab Test 08/15/22  1011   CR 1.30*       Ok to refill medication if creatinine is low          Failed - Recent (6 mo) or future (30 days) visit within the authorizing provider's specialty     Patient had office visit in the last 6 months or has a visit in the next 30 days with authorizing provider or within the authorizing provider's specialty.  See \"Patient Info\" tab in inbasket, or \"Choose Columns\" in Meds & Orders section of the refill encounter.            Passed - Patient has a normal ALT within the past 12 mos.     Recent Labs   Lab Test 08/15/22  1011   ALT 21             Passed - Patient has a normal AST within the past 12 mos.      Recent Labs   Lab Test 08/15/22  1011   AST 26             Passed - Diagnosis not CHF        Passed - Medication is active on med list        Passed - Patient is age 18 or older        Passed - Patient is not pregnant        Passed - Patient has not had a positive pregnancy test within the past 12 mos.             Lawrence Bill RN 06/18/23 " 12:34 PM

## 2023-06-21 ENCOUNTER — TELEPHONE (OUTPATIENT)
Dept: INTERNAL MEDICINE | Facility: CLINIC | Age: 88
End: 2023-06-21
Payer: COMMERCIAL

## 2023-06-21 NOTE — TELEPHONE ENCOUNTER
Left voice message for patient to call back:     Hello, my name is Swapna Infante and I am a pharmacist calling from [facility]. Your insurance plan has identified you as someone who may benefit from an appointment with a pharmacist for medication management. This is an appointment with a specially trained pharmacist to review your medications to make sure they are working for you to reach your health goals, safe, affordable and easy to take. This appointment is covered under your insurance plan. Please call the [phone] to schedule a time that works well for you.       Swapna Infante, PharmD, BCACP  Medication Management (MTM) Pharmacist  [facility]

## 2023-06-29 ENCOUNTER — TELEPHONE (OUTPATIENT)
Dept: PHARMACY | Facility: OTHER | Age: 88
End: 2023-06-29
Payer: COMMERCIAL

## 2023-06-29 NOTE — TELEPHONE ENCOUNTER
MTM referral from: Patient's insurance     MTM referral outreach attempt #2 on June 29, 2023       Outcome: Left message    Kia GuevaraD  MTM Pharmacist  [facility]

## 2023-07-10 ENCOUNTER — TRANSFERRED RECORDS (OUTPATIENT)
Dept: HEALTH INFORMATION MANAGEMENT | Facility: CLINIC | Age: 88
End: 2023-07-10
Payer: COMMERCIAL

## 2023-10-08 ENCOUNTER — HEALTH MAINTENANCE LETTER (OUTPATIENT)
Age: 88
End: 2023-10-08

## 2023-12-17 ENCOUNTER — HEALTH MAINTENANCE LETTER (OUTPATIENT)
Age: 88
End: 2023-12-17

## 2024-01-11 ENCOUNTER — TRANSFERRED RECORDS (OUTPATIENT)
Dept: HEALTH INFORMATION MANAGEMENT | Facility: CLINIC | Age: 89
End: 2024-01-11
Payer: COMMERCIAL

## 2024-02-25 ENCOUNTER — HEALTH MAINTENANCE LETTER (OUTPATIENT)
Age: 89
End: 2024-02-25

## 2024-07-14 ENCOUNTER — HEALTH MAINTENANCE LETTER (OUTPATIENT)
Age: 89
End: 2024-07-14

## 2024-12-01 ENCOUNTER — HEALTH MAINTENANCE LETTER (OUTPATIENT)
Age: 89
End: 2024-12-01

## 2025-02-15 ENCOUNTER — HEALTH MAINTENANCE LETTER (OUTPATIENT)
Age: 89
End: 2025-02-15

## 2025-03-15 ENCOUNTER — HEALTH MAINTENANCE LETTER (OUTPATIENT)
Age: 89
End: 2025-03-15

## 2025-08-11 ENCOUNTER — HOSPITAL ENCOUNTER (EMERGENCY)
Facility: CLINIC | Age: 89
Discharge: HOME | End: 2025-08-11
Attending: EMERGENCY MEDICINE | Admitting: EMERGENCY MEDICINE
Payer: COMMERCIAL

## 2025-08-11 ENCOUNTER — APPOINTMENT (OUTPATIENT)
Dept: CT IMAGING | Facility: CLINIC | Age: 89
End: 2025-08-11
Attending: EMERGENCY MEDICINE
Payer: COMMERCIAL

## 2025-08-11 VITALS
DIASTOLIC BLOOD PRESSURE: 66 MMHG | WEIGHT: 130 LBS | TEMPERATURE: 98.2 F | SYSTOLIC BLOOD PRESSURE: 132 MMHG | HEIGHT: 59 IN | RESPIRATION RATE: 18 BRPM | OXYGEN SATURATION: 96 % | HEART RATE: 66 BPM | BODY MASS INDEX: 26.21 KG/M2
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
## 2025-08-11 PROBLEM — N18.32 CHRONIC KIDNEY DISEASE, STAGE 3B (H): Status: ACTIVE | Noted: 2025-08-11

## 2025-08-11 PROBLEM — F02.80 ALZHEIMER'S DISEASE (H): Status: ACTIVE | Noted: 2023-06-08

## 2025-08-11 PROBLEM — F32.A DEPRESSIVE DISORDER: Status: ACTIVE | Noted: 2025-08-11

## 2025-08-11 PROBLEM — G30.9 ALZHEIMER'S DISEASE (H): Status: ACTIVE | Noted: 2023-06-08

## 2025-08-11 PROCEDURE — 70450 CT HEAD/BRAIN W/O DYE: CPT

## 2025-08-11 PROCEDURE — 99284 EMERGENCY DEPT VISIT MOD MDM: CPT | Mod: 25 | Performed by: EMERGENCY MEDICINE

## 2025-08-11 PROCEDURE — 72125 CT NECK SPINE W/O DYE: CPT

## 2025-08-11 ASSESSMENT — COLUMBIA-SUICIDE SEVERITY RATING SCALE - C-SSRS
6. HAVE YOU EVER DONE ANYTHING, STARTED TO DO ANYTHING, OR PREPARED TO DO ANYTHING TO END YOUR LIFE?: NO
1. IN THE PAST MONTH, HAVE YOU WISHED YOU WERE DEAD OR WISHED YOU COULD GO TO SLEEP AND NOT WAKE UP?: NO
2. HAVE YOU ACTUALLY HAD ANY THOUGHTS OF KILLING YOURSELF IN THE PAST MONTH?: NO

## 2025-08-30 ENCOUNTER — HEALTH MAINTENANCE LETTER (OUTPATIENT)
Age: 89
End: 2025-08-30